# Patient Record
Sex: FEMALE | Race: WHITE | NOT HISPANIC OR LATINO | Employment: OTHER | URBAN - NONMETROPOLITAN AREA
[De-identification: names, ages, dates, MRNs, and addresses within clinical notes are randomized per-mention and may not be internally consistent; named-entity substitution may affect disease eponyms.]

---

## 2017-06-18 ENCOUNTER — HOSPITAL ENCOUNTER (EMERGENCY)
Facility: HOSPITAL | Age: 77
Discharge: HOME | End: 2017-06-18
Attending: EMERGENCY MEDICINE | Admitting: FAMILY MEDICINE

## 2017-06-18 ENCOUNTER — APPOINTMENT (OUTPATIENT)
Dept: GENERAL RADIOLOGY | Facility: HOSPITAL | Age: 77
End: 2017-06-18

## 2017-06-18 ENCOUNTER — APPOINTMENT (OUTPATIENT)
Dept: CT IMAGING | Facility: HOSPITAL | Age: 77
End: 2017-06-18

## 2017-06-18 VITALS
HEART RATE: 77 BPM | RESPIRATION RATE: 20 BRPM | TEMPERATURE: 98.7 F | BODY MASS INDEX: 28.17 KG/M2 | SYSTOLIC BLOOD PRESSURE: 134 MMHG | WEIGHT: 159 LBS | OXYGEN SATURATION: 98 % | HEIGHT: 63 IN | DIASTOLIC BLOOD PRESSURE: 77 MMHG

## 2017-06-18 DIAGNOSIS — M84.48XA SACRAL INSUFFICIENCY FRACTURE, INITIAL ENCOUNTER: Primary | ICD-10-CM

## 2017-06-18 LAB
ALBUMIN SERPL-MCNC: 4.4 G/DL (ref 3.4–4.8)
ALBUMIN/GLOB SERPL: 1.3 G/DL (ref 1.5–2.5)
ALP SERPL-CCNC: 171 U/L (ref 35–104)
ALT SERPL W P-5'-P-CCNC: 28 U/L (ref 10–36)
ANION GAP SERPL CALCULATED.3IONS-SCNC: 8.2 MMOL/L (ref 3.6–11.2)
AST SERPL-CCNC: 24 U/L (ref 10–30)
BASOPHILS # BLD AUTO: 0.02 10*3/MM3 (ref 0–0.3)
BASOPHILS NFR BLD AUTO: 0.4 % (ref 0–2)
BILIRUB SERPL-MCNC: 0.7 MG/DL (ref 0.2–1.8)
BUN BLD-MCNC: 16 MG/DL (ref 7–21)
BUN/CREAT SERPL: 26.7 (ref 7–25)
CALCIUM SPEC-SCNC: 9.4 MG/DL (ref 7.7–10)
CHLORIDE SERPL-SCNC: 109 MMOL/L (ref 99–112)
CO2 SERPL-SCNC: 29.8 MMOL/L (ref 24.3–31.9)
CREAT BLD-MCNC: 0.6 MG/DL (ref 0.43–1.29)
DEPRECATED RDW RBC AUTO: 43.4 FL (ref 37–54)
EOSINOPHIL # BLD AUTO: 0.15 10*3/MM3 (ref 0–0.7)
EOSINOPHIL NFR BLD AUTO: 2.7 % (ref 0–7)
ERYTHROCYTE [DISTWIDTH] IN BLOOD BY AUTOMATED COUNT: 12.6 % (ref 11.5–14.5)
GFR SERPL CREATININE-BSD FRML MDRD: 97 ML/MIN/1.73
GLOBULIN UR ELPH-MCNC: 3.5 GM/DL
GLUCOSE BLD-MCNC: 77 MG/DL (ref 70–110)
HCT VFR BLD AUTO: 39.9 % (ref 37–47)
HGB BLD-MCNC: 12.5 G/DL (ref 12–16)
IMM GRANULOCYTES # BLD: 0.01 10*3/MM3 (ref 0–0.03)
IMM GRANULOCYTES NFR BLD: 0.2 % (ref 0–0.5)
LYMPHOCYTES # BLD AUTO: 1.56 10*3/MM3 (ref 1–3)
LYMPHOCYTES NFR BLD AUTO: 27.6 % (ref 16–46)
MCH RBC QN AUTO: 30.6 PG (ref 27–33)
MCHC RBC AUTO-ENTMCNC: 31.3 G/DL (ref 33–37)
MCV RBC AUTO: 97.6 FL (ref 80–94)
MONOCYTES # BLD AUTO: 0.45 10*3/MM3 (ref 0.1–0.9)
MONOCYTES NFR BLD AUTO: 8 % (ref 0–12)
NEUTROPHILS # BLD AUTO: 3.47 10*3/MM3 (ref 1.4–6.5)
NEUTROPHILS NFR BLD AUTO: 61.1 % (ref 40–75)
OSMOLALITY SERPL CALC.SUM OF ELEC: 292.4 MOSM/KG (ref 273–305)
PLATELET # BLD AUTO: 237 10*3/MM3 (ref 130–400)
PMV BLD AUTO: 9.4 FL (ref 6–10)
POTASSIUM BLD-SCNC: 3.8 MMOL/L (ref 3.5–5.3)
PROT SERPL-MCNC: 7.9 G/DL (ref 6–8)
RBC # BLD AUTO: 4.09 10*6/MM3 (ref 4.2–5.4)
SODIUM BLD-SCNC: 147 MMOL/L (ref 135–153)
WBC NRBC COR # BLD: 5.66 10*3/MM3 (ref 4.5–12.5)

## 2017-06-18 PROCEDURE — 74177 CT ABD & PELVIS W/CONTRAST: CPT

## 2017-06-18 PROCEDURE — 85025 COMPLETE CBC W/AUTO DIFF WBC: CPT | Performed by: NURSE PRACTITIONER

## 2017-06-18 PROCEDURE — 99283 EMERGENCY DEPT VISIT LOW MDM: CPT

## 2017-06-18 PROCEDURE — 0 IOPAMIDOL 61 % SOLUTION: Performed by: EMERGENCY MEDICINE

## 2017-06-18 PROCEDURE — 73523 X-RAY EXAM HIPS BI 5/> VIEWS: CPT | Performed by: RADIOLOGY

## 2017-06-18 PROCEDURE — 36415 COLL VENOUS BLD VENIPUNCTURE: CPT

## 2017-06-18 PROCEDURE — 74177 CT ABD & PELVIS W/CONTRAST: CPT | Performed by: RADIOLOGY

## 2017-06-18 PROCEDURE — 73523 X-RAY EXAM HIPS BI 5/> VIEWS: CPT

## 2017-06-18 PROCEDURE — 80053 COMPREHEN METABOLIC PANEL: CPT | Performed by: NURSE PRACTITIONER

## 2017-06-18 RX ORDER — SODIUM CHLORIDE 0.9 % (FLUSH) 0.9 %
10 SYRINGE (ML) INJECTION AS NEEDED
Status: DISCONTINUED | OUTPATIENT
Start: 2017-06-18 | End: 2017-06-18 | Stop reason: HOSPADM

## 2017-06-18 RX ADMIN — IOPAMIDOL 88 ML: 612 INJECTION, SOLUTION INTRAVENOUS at 19:11

## 2017-06-19 NOTE — DISCHARGE INSTRUCTIONS

## 2017-06-19 NOTE — ED PROVIDER NOTES
Subjective   Patient is a 77 y.o. female presenting with lower extremity pain.   History provided by:  Patient  Lower Extremity Issue   Location:  Hip  Hip location:  L hip  Pain details:     Quality:  Aching, shooting and sharp    Radiates to:  Does not radiate    Severity:  Moderate    Onset quality:  Sudden    Duration:  7 days    Timing:  Constant    Progression:  Worsening  Chronicity:  New  Dislocation: no    Foreign body present:  No foreign bodies  Prior injury to area:  No  Relieved by:  Nothing  Ineffective treatments:  None tried  Associated symptoms: no fever        Review of Systems   Constitutional: Negative.  Negative for fever.   HENT: Negative.    Respiratory: Negative.    Cardiovascular: Negative.  Negative for chest pain.   Gastrointestinal: Negative.  Negative for abdominal pain.   Endocrine: Negative.    Genitourinary: Negative.  Negative for dysuria.   Skin: Negative.    Neurological: Negative.    Psychiatric/Behavioral: Negative.    All other systems reviewed and are negative.      Past Medical History:   Diagnosis Date   • MRSA (methicillin resistant Staphylococcus aureus) 2007    History of MRSA with I&D of lower extremity       No Known Allergies    Past Surgical History:   Procedure Laterality Date   • AORTIC VALVE REPAIR/REPLACEMENT  10/2013    Dr. Sebastian   • CARDIAC SURGERY         History reviewed. No pertinent family history.    Social History     Social History   • Marital status:      Spouse name: N/A   • Number of children: N/A   • Years of education: N/A     Social History Main Topics   • Smoking status: Former Smoker     Quit date: 1987   • Smokeless tobacco: None   • Alcohol use No   • Drug use: No   • Sexual activity: Defer     Other Topics Concern   • None     Social History Narrative   • None           Objective   Physical Exam   Constitutional: She is oriented to person, place, and time. She appears well-developed and well-nourished. No distress.   HENT:   Head:  Normocephalic and atraumatic.   Right Ear: External ear normal.   Left Ear: External ear normal.   Nose: Nose normal.   Eyes: Conjunctivae and EOM are normal. Pupils are equal, round, and reactive to light.   Neck: Normal range of motion. Neck supple. No JVD present. No tracheal deviation present.   Cardiovascular: Normal rate, regular rhythm and normal heart sounds.    No murmur heard.  Pulmonary/Chest: Effort normal and breath sounds normal. No respiratory distress. She has no wheezes.   Abdominal: Soft. Bowel sounds are normal. There is no tenderness.   Musculoskeletal: Normal range of motion. She exhibits no edema or deformity.   Neurological: She is alert and oriented to person, place, and time. No cranial nerve deficit.   Skin: Skin is warm and dry. No rash noted. She is not diaphoretic. No erythema. No pallor.   Psychiatric: She has a normal mood and affect. Her behavior is normal. Thought content normal.   Nursing note and vitals reviewed.      Procedures         ED Course  ED Course   Value Comment By Time   CT Abdomen Pelvis With Contrast Left sacral insufficiency fracture.  Possible right sacral insufficiency fracture.  Mild L4 compression fracture of uncertain age. France Chaparro, APRCATHY 06/18 2039    Patient as great kailey support and would like to go home with them to follow up with PCP.  Dr. Miller said as long as she could ambulate she could go home to follow up outpatient.  Daughters agree to stay with patient at her house. France Chaparro, VALDEZ 06/18 2040                  MDM  Number of Diagnoses or Management Options  Sacral insufficiency fracture, initial encounter: new and requires workup     Amount and/or Complexity of Data Reviewed  Tests in the radiology section of CPT®: ordered and reviewed  Independent visualization of images, tracings, or specimens: yes    Risk of Complications, Morbidity, and/or Mortality  Presenting problems: moderate  Diagnostic procedures: low  Management options:  low    Patient Progress  Patient progress: improved      Final diagnoses:   Sacral insufficiency fracture, initial encounter            France Chaparro, APRN  06/19/17 0236

## 2017-07-17 ENCOUNTER — TELEPHONE (OUTPATIENT)
Dept: CARDIOLOGY | Facility: CLINIC | Age: 77
End: 2017-07-17

## 2017-07-17 NOTE — TELEPHONE ENCOUNTER
Family called in stating that patient will have hip surgery today, and they were admitted prior to procedure to get cardiac clearance and possibly an echo.  The cardiologist at the hospital signed off without doing the echo, and they are worried the patient needs one.    Informed patient that sometimes due to necessity of surgery the patient will have to be cleared by another cardiologist.  I confirmed patient's follow up appt.    Family verbalized understanding.

## 2017-08-14 RX ORDER — ATORVASTATIN CALCIUM 20 MG/1
TABLET, FILM COATED ORAL
Qty: 90 TABLET | Refills: 2 | Status: SHIPPED | OUTPATIENT
Start: 2017-08-14 | End: 2018-07-12 | Stop reason: SDUPTHER

## 2017-09-18 ENCOUNTER — TELEPHONE (OUTPATIENT)
Dept: CARDIOLOGY | Facility: CLINIC | Age: 77
End: 2017-09-18

## 2017-09-18 NOTE — TELEPHONE ENCOUNTER
Family wanted to know if patient needs same day echo for upcoming appt.    Informed patient that per last note she will be evaluated for echo during this appt.    Family verbalized understanding.

## 2017-09-19 ENCOUNTER — OFFICE VISIT (OUTPATIENT)
Dept: CARDIOLOGY | Facility: CLINIC | Age: 77
End: 2017-09-19

## 2017-09-19 VITALS
HEART RATE: 68 BPM | DIASTOLIC BLOOD PRESSURE: 68 MMHG | WEIGHT: 159 LBS | HEIGHT: 63 IN | SYSTOLIC BLOOD PRESSURE: 115 MMHG | BODY MASS INDEX: 28.17 KG/M2

## 2017-09-19 DIAGNOSIS — E78.5 DYSLIPIDEMIA: ICD-10-CM

## 2017-09-19 DIAGNOSIS — I10 ESSENTIAL HYPERTENSION: ICD-10-CM

## 2017-09-19 DIAGNOSIS — I38 VALVULAR HEART DISEASE: Primary | ICD-10-CM

## 2017-09-19 PROCEDURE — 99213 OFFICE O/P EST LOW 20 MIN: CPT | Performed by: PHYSICIAN ASSISTANT

## 2017-09-19 NOTE — PROGRESS NOTES
Encounter Date:09/19/2017      Patient ID: Elizabeth Caceres is a 77 y.o. female.    Chief Complaint: valvular heart disease    PROBLEM LIST:  1. Valvular heart disease:  a. Critical aortic stenosis by echocardiogram, October 2013, showing aortic valve area of 0.7 and mean gradient  of 45 mmHg.   b. Abnormal Cardiolite stress test, October 2013.    c. Cardiac catheterization study by Dr. Osman, 10/23/2013, showed critical aortic stenosis with gradient of 101 mm across the aortic valve.  EF 70%.  No significant coronary disease.    d.  Aortic valve replacement with bioprosthetic valve by Dr. Sebastian in October 2013.   e. Normal myocardial perfusion stress with no evidence of ischemia. EF 80  2. Hypertension.   3. Dyslipidemia.    4. History of presyncope.   5. History of MRSA with I&D of lower extremity, 2007.   6. Vitamin D and Vitamin B12 deficiency.     History of Present Illness  Patient presents today for follow-up with a history of VHD and cardiac risk factors. She reports no current exertional chest pain or dyspnea and orthopnea no PND no lower extremity edema no tachycardia palpitations dizziness or syncope.  She is currently recovering from a hip fracture.  She does physical therapy currently 3 times per week which includes bicycle riding without exertional chest pain or dyspnea.  Her blood pressure generally runs in the 130s systolic.  She has not had a lipid panel in the past year.    No Known Allergies      Current Outpatient Prescriptions:   •  aspirin 81 MG EC tablet, Take 81 mg by mouth daily., Disp: , Rfl:   •  atorvastatin (LIPITOR) 20 MG tablet, TAKE 1 TABLET EVERY DAY, Disp: 90 tablet, Rfl: 2  •  metoprolol tartrate (LOPRESSOR) 25 MG tablet, TAKE 1 TABLET TWICE DAILY, Disp: 180 tablet, Rfl: 2  •  Multiple Vitamins-Minerals (MULTIVITAMIN ADULT PO), Take  by mouth daily., Disp: , Rfl:   •  vitamin B-12 (CYANOCOBALAMIN) 1000 MCG tablet, Take 1,000 mcg by mouth daily., Disp: , Rfl:   •  vitamin C  "(ASCORBIC ACID) 250 MG tablet, Take 250 mg by mouth Daily., Disp: , Rfl:   •  Vitamin D, Cholecalciferol, (CHOLECALCIFEROL) 400 UNITS tablet, Take 400 Units by mouth Daily. Patient unsure of units prescribed, Disp: , Rfl:     The following portions of the patient's history were reviewed and updated as appropriate: allergies, current medications, past family history, past medical history, past social history, past surgical history and problem list.    Review of Systems   Constitution: Negative for diaphoresis, weakness, malaise/fatigue and weight gain.   Cardiovascular: Negative for chest pain, claudication, dyspnea on exertion, irregular heartbeat, leg swelling, orthopnea, palpitations, paroxysmal nocturnal dyspnea and syncope.   Respiratory: Negative for cough, shortness of breath, sleep disturbances due to breathing and wheezing.    Hematologic/Lymphatic: Negative for bleeding problem.   Musculoskeletal: Negative for muscle cramps, muscle weakness and myalgias.   Gastrointestinal: Negative for heartburn.       Objective:     Blood pressure 115/68, pulse 68, height 63\" (160 cm), weight 159 lb (72.1 kg).      Physical Exam   Constitutional: She appears well-developed.   Cardiovascular: Normal rate, regular rhythm and intact distal pulses.  Exam reveals no gallop and no friction rub.    Murmur heard.  2/6 systolic murmur right upper border   Pulmonary/Chest: Effort normal and breath sounds normal. No respiratory distress. She has no wheezes. She has no rales. She exhibits no tenderness.   Bases clear   Musculoskeletal: Normal range of motion. She exhibits no edema.       Lab Review:   Procedures       Assessment:      Diagnosis Plan   1.  valvular heart disease stable     2. Essential hypertension  Well controlled current medical regimen FLP and a CMP    3. Dyslipidemia       Plan:     Stable cardiac status.  Continue current medications.   FU in 12MO, sooner as needed.  Thank you for allowing us to participate in " the care of your patient.       NAYE Kowalski  09/19/17  1:42 PM      Please note that portions of this note may have been completed with a voice recognition program. Efforts were made to edit the dictations, but occasionally words are mistranscribed.

## 2017-10-03 ENCOUNTER — HOSPITAL ENCOUNTER (OUTPATIENT)
Dept: CARDIOLOGY | Facility: HOSPITAL | Age: 77
Discharge: HOME | End: 2017-10-03
Admitting: PHYSICIAN ASSISTANT

## 2017-10-03 DIAGNOSIS — I38 VALVULAR HEART DISEASE: ICD-10-CM

## 2017-10-03 PROCEDURE — 93306 TTE W/DOPPLER COMPLETE: CPT

## 2017-10-03 PROCEDURE — 93306 TTE W/DOPPLER COMPLETE: CPT | Performed by: INTERNAL MEDICINE

## 2017-10-05 LAB
BH CV ECHO MEAS - % IVS THICK: 28.6 %
BH CV ECHO MEAS - % LVPW THICK: 140 %
BH CV ECHO MEAS - ACS: 1.3 CM
BH CV ECHO MEAS - AO MAX PG (FULL): 25.7 MMHG
BH CV ECHO MEAS - AO MAX PG: 32.8 MMHG
BH CV ECHO MEAS - AO MEAN PG (FULL): 14.4 MMHG
BH CV ECHO MEAS - AO MEAN PG: 18.9 MMHG
BH CV ECHO MEAS - AO ROOT AREA (BSA CORRECTED): 1.4
BH CV ECHO MEAS - AO ROOT AREA: 4.9 CM^2
BH CV ECHO MEAS - AO ROOT DIAM: 2.5 CM
BH CV ECHO MEAS - AO V2 MAX: 286.2 CM/SEC
BH CV ECHO MEAS - AO V2 MEAN: 203.3 CM/SEC
BH CV ECHO MEAS - AO V2 VTI: 67.4 CM
BH CV ECHO MEAS - AVA(I,A): 1 CM^2
BH CV ECHO MEAS - AVA(I,D): 1 CM^2
BH CV ECHO MEAS - AVA(V,A): 0.94 CM^2
BH CV ECHO MEAS - AVA(V,D): 0.94 CM^2
BH CV ECHO MEAS - BSA(HAYCOCK): 1.8 M^2
BH CV ECHO MEAS - BSA: 1.8 M^2
BH CV ECHO MEAS - BZI_BMI: 28.2 KILOGRAMS/M^2
BH CV ECHO MEAS - BZI_METRIC_HEIGHT: 160 CM
BH CV ECHO MEAS - BZI_METRIC_WEIGHT: 72.1 KG
BH CV ECHO MEAS - CONTRAST EF 4CH: 69 ML/M^2
BH CV ECHO MEAS - EDV(CUBED): 201.9 ML
BH CV ECHO MEAS - EDV(MOD-SP4): 42 ML
BH CV ECHO MEAS - EDV(TEICH): 171 ML
BH CV ECHO MEAS - EF(CUBED): 77 %
BH CV ECHO MEAS - EF(MOD-SP4): 69 %
BH CV ECHO MEAS - EF(TEICH): 68.3 %
BH CV ECHO MEAS - ESV(CUBED): 46.4 ML
BH CV ECHO MEAS - ESV(MOD-SP4): 13 ML
BH CV ECHO MEAS - ESV(TEICH): 54.2 ML
BH CV ECHO MEAS - FS: 38.8 %
BH CV ECHO MEAS - IVS/LVPW: 1.1
BH CV ECHO MEAS - IVSD: 0.77 CM
BH CV ECHO MEAS - IVSS: 0.99 CM
BH CV ECHO MEAS - LA DIMENSION: 3.8 CM
BH CV ECHO MEAS - LA/AO: 1.5
BH CV ECHO MEAS - LV DIASTOLIC VOL/BSA (35-75): 23.9 ML/M^2
BH CV ECHO MEAS - LV MASS(C)D: 165.6 GRAMS
BH CV ECHO MEAS - LV MASS(C)DI: 94.4 GRAMS/M^2
BH CV ECHO MEAS - LV MASS(C)S: 174.3 GRAMS
BH CV ECHO MEAS - LV MASS(C)SI: 99.4 GRAMS/M^2
BH CV ECHO MEAS - LV MAX PG: 7.1 MMHG
BH CV ECHO MEAS - LV MEAN PG: 4.5 MMHG
BH CV ECHO MEAS - LV SYSTOLIC VOL/BSA (12-30): 7.4 ML/M^2
BH CV ECHO MEAS - LV V1 MAX: 133.5 CM/SEC
BH CV ECHO MEAS - LV V1 MEAN: 101.5 CM/SEC
BH CV ECHO MEAS - LV V1 VTI: 34 CM
BH CV ECHO MEAS - LVIDD: 5.9 CM
BH CV ECHO MEAS - LVIDS: 3.6 CM
BH CV ECHO MEAS - LVLD AP4: 7.1 CM
BH CV ECHO MEAS - LVLS AP4: 5.4 CM
BH CV ECHO MEAS - LVOT AREA (M): 2 CM^2
BH CV ECHO MEAS - LVOT AREA: 2 CM^2
BH CV ECHO MEAS - LVOT DIAM: 1.6 CM
BH CV ECHO MEAS - LVPWD: 0.73 CM
BH CV ECHO MEAS - LVPWS: 1.8 CM
BH CV ECHO MEAS - MV A MAX VEL: 126.4 CM/SEC
BH CV ECHO MEAS - MV E MAX VEL: 87.9 CM/SEC
BH CV ECHO MEAS - MV E/A: 0.7
BH CV ECHO MEAS - PA ACC SLOPE: 1020 CM/SEC^2
BH CV ECHO MEAS - PA ACC TIME: 0.12 SEC
BH CV ECHO MEAS - PA PR(ACCEL): 25.1 MMHG
BH CV ECHO MEAS - RAP SYSTOLE: 10 MMHG
BH CV ECHO MEAS - RVDD: 1.4 CM
BH CV ECHO MEAS - RVSP: 34.1 MMHG
BH CV ECHO MEAS - SI(AO): 188.5 ML/M^2
BH CV ECHO MEAS - SI(CUBED): 88.6 ML/M^2
BH CV ECHO MEAS - SI(LVOT): 38.8 ML/M^2
BH CV ECHO MEAS - SI(MOD-SP4): 16.5 ML/M^2
BH CV ECHO MEAS - SI(TEICH): 66.6 ML/M^2
BH CV ECHO MEAS - SV(AO): 330.6 ML
BH CV ECHO MEAS - SV(CUBED): 155.5 ML
BH CV ECHO MEAS - SV(LVOT): 68.1 ML
BH CV ECHO MEAS - SV(MOD-SP4): 29 ML
BH CV ECHO MEAS - SV(TEICH): 116.8 ML
BH CV ECHO MEAS - TR MAX VEL: 245.7 CM/SEC

## 2017-10-19 ENCOUNTER — TRANSCRIBE ORDERS (OUTPATIENT)
Dept: ADMINISTRATIVE | Facility: HOSPITAL | Age: 77
End: 2017-10-19

## 2017-10-19 DIAGNOSIS — Z78.0 POSTMENOPAUSAL: ICD-10-CM

## 2017-10-19 DIAGNOSIS — M80.00XA AGE-RELATED OSTEOPOROSIS WITH CURRENT PATHOLOGICAL FRACTURE, INITIAL ENCOUNTER: Primary | ICD-10-CM

## 2017-10-25 ENCOUNTER — TRANSCRIBE ORDERS (OUTPATIENT)
Dept: ADMINISTRATIVE | Facility: HOSPITAL | Age: 77
End: 2017-10-25

## 2017-10-25 DIAGNOSIS — Z78.0 POST-MENOPAUSAL: Primary | ICD-10-CM

## 2017-11-07 ENCOUNTER — HOSPITAL ENCOUNTER (OUTPATIENT)
Dept: BONE DENSITY | Facility: HOSPITAL | Age: 77
Discharge: HOME | End: 2017-11-07
Attending: INTERNAL MEDICINE | Admitting: INTERNAL MEDICINE

## 2017-11-07 DIAGNOSIS — Z78.0 POST-MENOPAUSAL: ICD-10-CM

## 2017-11-07 PROCEDURE — 77080 DXA BONE DENSITY AXIAL: CPT | Performed by: RADIOLOGY

## 2017-11-07 PROCEDURE — 77080 DXA BONE DENSITY AXIAL: CPT

## 2018-07-13 RX ORDER — ATORVASTATIN CALCIUM 20 MG/1
TABLET, FILM COATED ORAL
Qty: 90 TABLET | Refills: 1 | Status: SHIPPED | OUTPATIENT
Start: 2018-07-13 | End: 2018-07-19 | Stop reason: SDUPTHER

## 2018-07-19 RX ORDER — ATORVASTATIN CALCIUM 20 MG/1
20 TABLET, FILM COATED ORAL DAILY
Qty: 90 TABLET | Refills: 1 | Status: SHIPPED | OUTPATIENT
Start: 2018-07-19 | End: 2019-04-23

## 2018-09-25 ENCOUNTER — OFFICE VISIT (OUTPATIENT)
Dept: CARDIOLOGY | Facility: CLINIC | Age: 78
End: 2018-09-25

## 2018-09-25 VITALS
DIASTOLIC BLOOD PRESSURE: 60 MMHG | HEART RATE: 72 BPM | BODY MASS INDEX: 28.51 KG/M2 | WEIGHT: 151 LBS | HEIGHT: 61 IN | OXYGEN SATURATION: 96 % | SYSTOLIC BLOOD PRESSURE: 140 MMHG

## 2018-09-25 DIAGNOSIS — I10 ESSENTIAL HYPERTENSION: ICD-10-CM

## 2018-09-25 DIAGNOSIS — E78.5 DYSLIPIDEMIA: ICD-10-CM

## 2018-09-25 DIAGNOSIS — I38 VALVULAR HEART DISEASE: Primary | ICD-10-CM

## 2018-09-25 PROCEDURE — 99213 OFFICE O/P EST LOW 20 MIN: CPT | Performed by: INTERNAL MEDICINE

## 2018-09-25 NOTE — PROGRESS NOTES
Encounter Date:09/25/2018      Patient ID: Elizabeth Caceres is a 78 y.o. female.    Chief Complaint: vhd      PROBLEM LIST:  1. Valvular heart disease:  a. Critical aortic stenosis by echocardiogram, October 2013, showing aortic valve area of 0.7 and mean gradient  of 45 mmHg.   b. Abnormal Cardiolite stress test, October 2013.    c. Cardiac catheterization study by Dr. Osman, 10/23/2013, showed critical aortic stenosis with gradient of 101 mm across the aortic valve.  EF 70%.  No significant coronary disease.    d.  Aortic valve replacement with bioprosthetic valve by Dr. Sebastian in October 2013.   e. Normal myocardial perfusion stress with no evidence of ischemia. EF 80  f. Echo 10/3/2017: Normal left ventricular cavity size and wall thickness noted. All left ventricular wall segments contract normally. Estimated EF appears to be in the range of 66 - 70%. Left ventricular diastolic dysfunction is noted (grade I) consistent with impaired relaxation. There is a bioprosthetic aortic valve present There is calcification of the aortic valve mainly affecting the non and right coronary cusp(s). Mild to moderate aortic valve stenosis is present.Peak gradient of 35 and a mean gradient of 20 mm Hg. with AV area calculated at about 1.0cm2 which is probably an underestimation. The mitral valve is abnormal in structure. There are myxomatous changes of the mitral valve apparatus present. Mild mitral valve regurgitation is present. No significant mitral valve stenosis is present. The tricuspid valve is grossly normal. Mild tricuspid valve regurgitation is present. Estimated right ventricular systolic pressure from tricuspid regurgitation is normal (<35 mmHg). There is no evidence of pericardial effusion.  2. Hypertension.   3. Dyslipidemia.    4. History of presyncope.   5. History of MRSA with I&D of lower extremity, 2007.   6. Vitamin D and Vitamin B12 deficiency.     History of Present Illness  Patient presents today  "for follow-up with a history of valvular heart disease and cardiac risk factors. Since last visit has been doing well from a cardiac standpoint. She regularly monitors her blood pressure at home, with readings usually low. Denies chest pain, shortness of breath, leg swelling, palpitations, and syncope. Remains busy and active with walking and household chores with no limitations.    No Known Allergies      Current Outpatient Prescriptions:   •  aspirin 81 MG EC tablet, Take 81 mg by mouth daily., Disp: , Rfl:   •  atorvastatin (LIPITOR) 20 MG tablet, Take 1 tablet by mouth Daily., Disp: 90 tablet, Rfl: 1  •  Cyanocobalamin (B-12 COMPLIANCE INJECTION) 1000 MCG/ML kit, Inject  as directed Every 30 (Thirty) Days., Disp: , Rfl:   •  metoprolol tartrate (LOPRESSOR) 25 MG tablet, Take 1 tablet by mouth 2 (Two) Times a Day., Disp: 180 tablet, Rfl: 1  •  vitamin C (ASCORBIC ACID) 250 MG tablet, Take 500 mg by mouth Daily., Disp: , Rfl:   •  Vitamin D, Cholecalciferol, (CHOLECALCIFEROL) 400 UNITS tablet, Take 1,000 Units by mouth Daily. Patient unsure of units prescribed , Disp: , Rfl:     The following portions of the patient's history were reviewed and updated as appropriate: allergies, current medications, past family history, past medical history, past social history, past surgical history and problem list.    ROS  Review of Systems   Constitution: Negative for chills, fever, weight gain and weight loss.   Cardiovascular: Negative for chest pain, claudication, dyspnea on exertion, leg swelling, orthopnea, palpitations, paroxysmal nocturnal dyspnea and syncope.        No dizziness   Gastrointestinal: Negative for abdominal pain, constipation, diarrhea, nausea and vomiting.   Genitourinary:        No urinary symptoms   Neurological:        No symptoms of stroke.   All other systems reviewed and are negative.    Objective:     Blood pressure 140/60, pulse 72, height 154.9 cm (61\"), weight 68.5 kg (151 lb), SpO2 96 %.      " Physical Exam  Constitutional: She appears well-developed and well-nourished.   HENT:   HEENT exam unremarkable.   Neck: Neck supple. No JVD present.   No carotid bruits.   Cardiovascular: Normal rate, regular rhythm and normal heart sounds.    2/6 systolic murmur heard.  2 plus symmetric pulses.   Pulmonary/Chest: Breath sounds normal. Does not exhibit tenderness.   Abdominal:   Abdomen benign.   Musculoskeletal: Does not exhibit edema.   Neurological:   Neurological exam unremarkable.   Vitals reviewed.    Lab Review:   Procedures       Assessment:      Diagnosis Plan   1. Valvular heart disease  Schedule echo to assess valve function due to prosthetic valve.   2. Essential hypertension  Well controlled with current medication regimen.    3. Dyslipidemia. LDL goal <70. Continue Lipitor 20 mg daily.     Plan:   If start to notice new symptoms of chest pain, dyspnea/edema, dizziness or syncope, she should pursue further heart and evaluation for checkup due to progressive prosthetic valve aortic stenosis.  Schedule echo to assess valve fucntion for close monitoring  Continue current medications.   FU in 12 MO, sooner as needed.  Thank you for allowing us to participate in the care of your patient.     Scribed for Carlton Osman MD by Berry Brewer. 9/25/2018  2:25 PM    ICarlton MD, personally performed the services described in this documentation as scribed by the above named individual in my presence, and it is both accurate and complete.  9/25/2018  2:29 PM        Please note that portions of this note may have been completed with a voice recognition program. Efforts were made to edit the dictations, but occasionally words are mistranscribed.

## 2018-10-11 ENCOUNTER — HOSPITAL ENCOUNTER (OUTPATIENT)
Dept: CARDIOLOGY | Facility: HOSPITAL | Age: 78
Discharge: HOME | End: 2018-10-11
Admitting: INTERNAL MEDICINE

## 2018-10-11 DIAGNOSIS — I38 VALVULAR HEART DISEASE: ICD-10-CM

## 2018-10-11 PROCEDURE — 93306 TTE W/DOPPLER COMPLETE: CPT

## 2018-10-11 PROCEDURE — 93306 TTE W/DOPPLER COMPLETE: CPT | Performed by: INTERNAL MEDICINE

## 2018-10-12 LAB
BH CV ECHO MEAS - ACS: 1.9 CM
BH CV ECHO MEAS - AO MAX PG: 30.4 MMHG
BH CV ECHO MEAS - AO MEAN PG: 18.2 MMHG
BH CV ECHO MEAS - AO ROOT AREA (BSA CORRECTED): 1.7
BH CV ECHO MEAS - AO ROOT AREA: 6 CM^2
BH CV ECHO MEAS - AO ROOT DIAM: 2.8 CM
BH CV ECHO MEAS - AO V2 MAX: 275.5 CM/SEC
BH CV ECHO MEAS - AO V2 MEAN: 201.2 CM/SEC
BH CV ECHO MEAS - AO V2 VTI: 62.6 CM
BH CV ECHO MEAS - BSA(HAYCOCK): 1.7 M^2
BH CV ECHO MEAS - BSA: 1.7 M^2
BH CV ECHO MEAS - BZI_BMI: 28.5 KILOGRAMS/M^2
BH CV ECHO MEAS - BZI_METRIC_HEIGHT: 154.9 CM
BH CV ECHO MEAS - BZI_METRIC_WEIGHT: 68.5 KG
BH CV ECHO MEAS - EDV(CUBED): 29 ML
BH CV ECHO MEAS - EDV(MOD-SP4): 58 ML
BH CV ECHO MEAS - EDV(TEICH): 37.1 ML
BH CV ECHO MEAS - EF(CUBED): 49.3 %
BH CV ECHO MEAS - EF(MOD-SP4): 74.1 %
BH CV ECHO MEAS - EF(TEICH): 42.8 %
BH CV ECHO MEAS - ESV(CUBED): 14.7 ML
BH CV ECHO MEAS - ESV(MOD-SP4): 15 ML
BH CV ECHO MEAS - ESV(TEICH): 21.2 ML
BH CV ECHO MEAS - FS: 20.3 %
BH CV ECHO MEAS - IVS/LVPW: 1.3
BH CV ECHO MEAS - IVSD: 1.5 CM
BH CV ECHO MEAS - LA DIMENSION: 3.6 CM
BH CV ECHO MEAS - LA/AO: 1.3
BH CV ECHO MEAS - LV DIASTOLIC VOL/BSA (35-75): 34.6 ML/M^2
BH CV ECHO MEAS - LV MASS(C)D: 124.5 GRAMS
BH CV ECHO MEAS - LV MASS(C)DI: 74.3 GRAMS/M^2
BH CV ECHO MEAS - LV SYSTOLIC VOL/BSA (12-30): 8.9 ML/M^2
BH CV ECHO MEAS - LVIDD: 3.1 CM
BH CV ECHO MEAS - LVIDS: 2.5 CM
BH CV ECHO MEAS - LVLD AP4: 6.9 CM
BH CV ECHO MEAS - LVLS AP4: 5.8 CM
BH CV ECHO MEAS - LVOT AREA (M): 1.8 CM^2
BH CV ECHO MEAS - LVOT AREA: 1.8 CM^2
BH CV ECHO MEAS - LVOT DIAM: 1.5 CM
BH CV ECHO MEAS - LVPWD: 1.1 CM
BH CV ECHO MEAS - MV A MAX VEL: 107.1 CM/SEC
BH CV ECHO MEAS - MV E MAX VEL: 86.9 CM/SEC
BH CV ECHO MEAS - MV E/A: 0.81
BH CV ECHO MEAS - RAP SYSTOLE: 10 MMHG
BH CV ECHO MEAS - RVDD: 3.5 CM
BH CV ECHO MEAS - RVSP: 36.9 MMHG
BH CV ECHO MEAS - SI(AO): 225.5 ML/M^2
BH CV ECHO MEAS - SI(CUBED): 8.5 ML/M^2
BH CV ECHO MEAS - SI(MOD-SP4): 25.6 ML/M^2
BH CV ECHO MEAS - SI(TEICH): 9.5 ML/M^2
BH CV ECHO MEAS - SV(AO): 378 ML
BH CV ECHO MEAS - SV(CUBED): 14.3 ML
BH CV ECHO MEAS - SV(MOD-SP4): 43 ML
BH CV ECHO MEAS - SV(TEICH): 15.9 ML
BH CV ECHO MEAS - TR MAX VEL: 259.3 CM/SEC
MAXIMAL PREDICTED HEART RATE: 142 BPM
STRESS TARGET HR: 121 BPM

## 2019-04-22 ENCOUNTER — HOSPITAL ENCOUNTER (OUTPATIENT)
Facility: HOSPITAL | Age: 79
Discharge: HOME | End: 2019-04-24
Attending: EMERGENCY MEDICINE | Admitting: SURGERY

## 2019-04-22 ENCOUNTER — APPOINTMENT (OUTPATIENT)
Dept: CT IMAGING | Facility: HOSPITAL | Age: 79
End: 2019-04-22

## 2019-04-22 DIAGNOSIS — N30.00 ACUTE CYSTITIS WITHOUT HEMATURIA: ICD-10-CM

## 2019-04-22 DIAGNOSIS — K35.30 ACUTE APPENDICITIS WITH LOCALIZED PERITONITIS, WITHOUT PERFORATION, ABSCESS, OR GANGRENE: Primary | ICD-10-CM

## 2019-04-22 DIAGNOSIS — N28.1 BILATERAL RENAL CYSTS: ICD-10-CM

## 2019-04-22 LAB
ALBUMIN SERPL-MCNC: 4.07 G/DL (ref 3.5–5.2)
ALBUMIN/GLOB SERPL: 1.2 G/DL
ALP SERPL-CCNC: 87 U/L (ref 39–117)
ALT SERPL W P-5'-P-CCNC: 12 U/L (ref 1–33)
AMYLASE SERPL-CCNC: 67 U/L (ref 28–100)
ANION GAP SERPL CALCULATED.3IONS-SCNC: 13.8 MMOL/L
AST SERPL-CCNC: 16 U/L (ref 1–32)
BACTERIA UR QL AUTO: ABNORMAL /HPF
BASOPHILS # BLD AUTO: 0.02 10*3/MM3 (ref 0–0.2)
BASOPHILS NFR BLD AUTO: 0.2 % (ref 0–1.5)
BILIRUB SERPL-MCNC: 1.4 MG/DL (ref 0.2–1.2)
BILIRUB UR QL STRIP: NEGATIVE
BUN BLD-MCNC: 20 MG/DL (ref 8–23)
BUN/CREAT SERPL: 27 (ref 7–25)
CALCIUM SPEC-SCNC: 8.7 MG/DL (ref 8.6–10.5)
CHLORIDE SERPL-SCNC: 104 MMOL/L (ref 98–107)
CLARITY UR: ABNORMAL
CO2 SERPL-SCNC: 24.2 MMOL/L (ref 22–29)
COLOR UR: ABNORMAL
CREAT BLD-MCNC: 0.74 MG/DL (ref 0.57–1)
D-LACTATE SERPL-SCNC: 0.8 MMOL/L (ref 0.5–2)
DEPRECATED RDW RBC AUTO: 43.5 FL (ref 37–54)
EOSINOPHIL # BLD AUTO: 0.09 10*3/MM3 (ref 0–0.4)
EOSINOPHIL NFR BLD AUTO: 1 % (ref 0.3–6.2)
ERYTHROCYTE [DISTWIDTH] IN BLOOD BY AUTOMATED COUNT: 12.4 % (ref 12.3–15.4)
GFR SERPL CREATININE-BSD FRML MDRD: 76 ML/MIN/1.73
GLOBULIN UR ELPH-MCNC: 3.3 GM/DL
GLUCOSE BLD-MCNC: 125 MG/DL (ref 65–99)
GLUCOSE UR STRIP-MCNC: NEGATIVE MG/DL
HCT VFR BLD AUTO: 38 % (ref 34–46.6)
HGB BLD-MCNC: 12.5 G/DL (ref 12–15.9)
HGB UR QL STRIP.AUTO: NEGATIVE
HYALINE CASTS UR QL AUTO: ABNORMAL /LPF
IMM GRANULOCYTES # BLD AUTO: 0.01 10*3/MM3 (ref 0–0.05)
IMM GRANULOCYTES NFR BLD AUTO: 0.1 % (ref 0–0.5)
KETONES UR QL STRIP: NEGATIVE
LEUKOCYTE ESTERASE UR QL STRIP.AUTO: ABNORMAL
LIPASE SERPL-CCNC: 26 U/L (ref 13–60)
LYMPHOCYTES # BLD AUTO: 1.63 10*3/MM3 (ref 0.7–3.1)
LYMPHOCYTES NFR BLD AUTO: 17.4 % (ref 19.6–45.3)
MCH RBC QN AUTO: 32.6 PG (ref 26.6–33)
MCHC RBC AUTO-ENTMCNC: 32.9 G/DL (ref 31.5–35.7)
MCV RBC AUTO: 99 FL (ref 79–97)
MONOCYTES # BLD AUTO: 0.74 10*3/MM3 (ref 0.1–0.9)
MONOCYTES NFR BLD AUTO: 7.9 % (ref 5–12)
NEUTROPHILS # BLD AUTO: 6.87 10*3/MM3 (ref 1.7–7)
NEUTROPHILS NFR BLD AUTO: 73.4 % (ref 42.7–76)
NITRITE UR QL STRIP: POSITIVE
PH UR STRIP.AUTO: 6.5 [PH] (ref 5–8)
PLATELET # BLD AUTO: 172 10*3/MM3 (ref 140–450)
PMV BLD AUTO: 9.7 FL (ref 6–12)
POTASSIUM BLD-SCNC: 3.9 MMOL/L (ref 3.5–5.2)
PROT SERPL-MCNC: 7.4 G/DL (ref 6–8.5)
PROT UR QL STRIP: NEGATIVE
RBC # BLD AUTO: 3.84 10*6/MM3 (ref 3.77–5.28)
RBC # UR: ABNORMAL /HPF
REF LAB TEST METHOD: ABNORMAL
SODIUM BLD-SCNC: 142 MMOL/L (ref 136–145)
SP GR UR STRIP: 1.01 (ref 1–1.03)
SQUAMOUS #/AREA URNS HPF: ABNORMAL /HPF
TROPONIN T SERPL-MCNC: <0.01 NG/ML (ref 0–0.03)
UROBILINOGEN UR QL STRIP: ABNORMAL
WBC NRBC COR # BLD: 9.36 10*3/MM3 (ref 3.4–10.8)
WBC UR QL AUTO: ABNORMAL /HPF

## 2019-04-22 PROCEDURE — 25010000002 ONDANSETRON PER 1 MG: Performed by: PHYSICIAN ASSISTANT

## 2019-04-22 PROCEDURE — 96375 TX/PRO/DX INJ NEW DRUG ADDON: CPT

## 2019-04-22 PROCEDURE — 99284 EMERGENCY DEPT VISIT MOD MDM: CPT

## 2019-04-22 PROCEDURE — 87040 BLOOD CULTURE FOR BACTERIA: CPT | Performed by: PHYSICIAN ASSISTANT

## 2019-04-22 PROCEDURE — 25010000002 CEFTRIAXONE: Performed by: PHYSICIAN ASSISTANT

## 2019-04-22 PROCEDURE — 83690 ASSAY OF LIPASE: CPT | Performed by: PHYSICIAN ASSISTANT

## 2019-04-22 PROCEDURE — 87077 CULTURE AEROBIC IDENTIFY: CPT | Performed by: PHYSICIAN ASSISTANT

## 2019-04-22 PROCEDURE — 87086 URINE CULTURE/COLONY COUNT: CPT | Performed by: PHYSICIAN ASSISTANT

## 2019-04-22 PROCEDURE — 25010000002 MORPHINE PER 10 MG: Performed by: EMERGENCY MEDICINE

## 2019-04-22 PROCEDURE — 87186 SC STD MICRODIL/AGAR DIL: CPT | Performed by: PHYSICIAN ASSISTANT

## 2019-04-22 PROCEDURE — 84484 ASSAY OF TROPONIN QUANT: CPT | Performed by: PHYSICIAN ASSISTANT

## 2019-04-22 PROCEDURE — 93010 ELECTROCARDIOGRAM REPORT: CPT | Performed by: INTERNAL MEDICINE

## 2019-04-22 PROCEDURE — 83605 ASSAY OF LACTIC ACID: CPT | Performed by: PHYSICIAN ASSISTANT

## 2019-04-22 PROCEDURE — 80053 COMPREHEN METABOLIC PANEL: CPT | Performed by: PHYSICIAN ASSISTANT

## 2019-04-22 PROCEDURE — 81001 URINALYSIS AUTO W/SCOPE: CPT | Performed by: PHYSICIAN ASSISTANT

## 2019-04-22 PROCEDURE — 85025 COMPLETE CBC W/AUTO DIFF WBC: CPT | Performed by: PHYSICIAN ASSISTANT

## 2019-04-22 PROCEDURE — 96365 THER/PROPH/DIAG IV INF INIT: CPT

## 2019-04-22 PROCEDURE — 74177 CT ABD & PELVIS W/CONTRAST: CPT | Performed by: RADIOLOGY

## 2019-04-22 PROCEDURE — 74177 CT ABD & PELVIS W/CONTRAST: CPT

## 2019-04-22 PROCEDURE — 82150 ASSAY OF AMYLASE: CPT | Performed by: PHYSICIAN ASSISTANT

## 2019-04-22 PROCEDURE — 25010000002 IOPAMIDOL 61 % SOLUTION: Performed by: EMERGENCY MEDICINE

## 2019-04-22 PROCEDURE — 93005 ELECTROCARDIOGRAM TRACING: CPT | Performed by: PHYSICIAN ASSISTANT

## 2019-04-22 RX ORDER — ONDANSETRON 2 MG/ML
4 INJECTION INTRAMUSCULAR; INTRAVENOUS ONCE
Status: COMPLETED | OUTPATIENT
Start: 2019-04-22 | End: 2019-04-22

## 2019-04-22 RX ORDER — SODIUM CHLORIDE 0.9 % (FLUSH) 0.9 %
10 SYRINGE (ML) INJECTION AS NEEDED
Status: DISCONTINUED | OUTPATIENT
Start: 2019-04-22 | End: 2019-04-24 | Stop reason: HOSPADM

## 2019-04-22 RX ORDER — MORPHINE SULFATE 2 MG/ML
2 INJECTION, SOLUTION INTRAMUSCULAR; INTRAVENOUS ONCE
Status: COMPLETED | OUTPATIENT
Start: 2019-04-22 | End: 2019-04-22

## 2019-04-22 RX ADMIN — CEFTRIAXONE 1 G: 1 INJECTION, POWDER, FOR SOLUTION INTRAMUSCULAR; INTRAVENOUS at 23:31

## 2019-04-22 RX ADMIN — IOPAMIDOL 100 ML: 612 INJECTION, SOLUTION INTRAVENOUS at 22:56

## 2019-04-22 RX ADMIN — ONDANSETRON 4 MG: 2 INJECTION, SOLUTION INTRAMUSCULAR; INTRAVENOUS at 21:33

## 2019-04-22 RX ADMIN — MORPHINE SULFATE 2 MG: 2 INJECTION, SOLUTION INTRAMUSCULAR; INTRAVENOUS at 21:33

## 2019-04-22 RX ADMIN — SODIUM CHLORIDE 500 ML: 9 INJECTION, SOLUTION INTRAVENOUS at 21:35

## 2019-04-23 ENCOUNTER — ANESTHESIA (OUTPATIENT)
Dept: PERIOP | Facility: HOSPITAL | Age: 79
End: 2019-04-23

## 2019-04-23 ENCOUNTER — ANESTHESIA EVENT (OUTPATIENT)
Dept: PERIOP | Facility: HOSPITAL | Age: 79
End: 2019-04-23

## 2019-04-23 PROCEDURE — 25010000002 ONDANSETRON PER 1 MG: Performed by: NURSE ANESTHETIST, CERTIFIED REGISTERED

## 2019-04-23 PROCEDURE — 63710000001 VITAMIN C 500 MG TABLET: Performed by: SURGERY

## 2019-04-23 PROCEDURE — 88305 TISSUE EXAM BY PATHOLOGIST: CPT | Performed by: SURGERY

## 2019-04-23 PROCEDURE — G0378 HOSPITAL OBSERVATION PER HR: HCPCS

## 2019-04-23 PROCEDURE — A9270 NON-COVERED ITEM OR SERVICE: HCPCS | Performed by: SURGERY

## 2019-04-23 PROCEDURE — 25010000002 PHENYLEPHRINE PER 1 ML: Performed by: NURSE ANESTHETIST, CERTIFIED REGISTERED

## 2019-04-23 PROCEDURE — 99220 PR INITIAL OBSERVATION CARE/DAY 70 MINUTES: CPT | Performed by: SURGERY

## 2019-04-23 PROCEDURE — 88304 TISSUE EXAM BY PATHOLOGIST: CPT | Performed by: SURGERY

## 2019-04-23 PROCEDURE — 25010000002 NEOSTIGMINE 10 MG/10ML SOLUTION: Performed by: NURSE ANESTHETIST, CERTIFIED REGISTERED

## 2019-04-23 PROCEDURE — 63710000001 METOPROLOL TARTRATE 25 MG TABLET: Performed by: SURGERY

## 2019-04-23 PROCEDURE — 25010000002 ENOXAPARIN PER 10 MG: Performed by: SURGERY

## 2019-04-23 PROCEDURE — 25010000002 FENTANYL CITRATE (PF) 100 MCG/2ML SOLUTION: Performed by: NURSE ANESTHETIST, CERTIFIED REGISTERED

## 2019-04-23 PROCEDURE — 25010000002 PROPOFOL 10 MG/ML EMULSION: Performed by: NURSE ANESTHETIST, CERTIFIED REGISTERED

## 2019-04-23 PROCEDURE — 63710000001 CHOLECALCIFEROL 400 UNITS TABLET: Performed by: SURGERY

## 2019-04-23 PROCEDURE — 63710000001 ATORVASTATIN 20 MG TABLET: Performed by: SURGERY

## 2019-04-23 PROCEDURE — 25010000002 ONDANSETRON PER 1 MG: Performed by: SURGERY

## 2019-04-23 PROCEDURE — 44970 LAPAROSCOPY APPENDECTOMY: CPT | Performed by: SURGERY

## 2019-04-23 PROCEDURE — 25010000002 PIPERACILLIN-TAZOBACTAM: Performed by: SURGERY

## 2019-04-23 DEVICE — THE ECHELON, ECHELON ENDOPATH™ AND ECHELON FLEX™ FAMILIES OF ENDOSCOPIC LINEAR CUTTERS AND RELOADS ARE STERILE, SINGLE PATIENT USE INSTRUMENTS THAT SIMULTANEOUSLY CUT AND STAPLE TISSUE. THERE ARE SIX STAGGERED ROWS OF STAPLES, THREE ON EITHER SIDE OF THE CUT LINE. THE 45 MM INSTRUMENTS HAVE A STAPLE LINE THATIS APPROXIMATELY 45 MM LONG AND A CUT LINE THAT IS APPROXIMATELY 42 MM LONG. THE SHAFT CAN ROTATE FREELY IN BOTH DIRECTIONS AND AN ARTICULATION MECHANISM ON ARTICULATING INSTRUMENTS ENABLES BENDING THE DISTAL PORTIONOF THE SHAFT TO FACILITATE LATERAL ACCESS OF THE OPERATIVE SITE.THE INSTRUMENTS ARE SHIPPED WITHOUT A RELOAD AND MUST BE LOADED PRIOR TO USE. A STAPLE RETAINING CAP ON THE RELOAD PROTECTS THE STAPLE LEG POINTS DURING SHIPPING AND TRANSPORTATION. THE INSTRUMENTS’ LOCK-OUT FEATURE IS DESIGNED TO PREVENT A USED RELOAD FROM BEING REFIRED.
Type: IMPLANTABLE DEVICE | Status: FUNCTIONAL
Brand: ECHELON ENDOPATH

## 2019-04-23 RX ORDER — SODIUM CHLORIDE 0.9 % (FLUSH) 0.9 %
3-10 SYRINGE (ML) INJECTION AS NEEDED
Status: DISCONTINUED | OUTPATIENT
Start: 2019-04-23 | End: 2019-04-23 | Stop reason: HOSPADM

## 2019-04-23 RX ORDER — BUPIVACAINE HYDROCHLORIDE AND EPINEPHRINE 5; 5 MG/ML; UG/ML
INJECTION, SOLUTION EPIDURAL; INTRACAUDAL; PERINEURAL AS NEEDED
Status: DISCONTINUED | OUTPATIENT
Start: 2019-04-23 | End: 2019-04-23 | Stop reason: HOSPADM

## 2019-04-23 RX ORDER — ONDANSETRON 2 MG/ML
4 INJECTION INTRAMUSCULAR; INTRAVENOUS EVERY 6 HOURS PRN
Status: DISCONTINUED | OUTPATIENT
Start: 2019-04-23 | End: 2019-04-24 | Stop reason: HOSPADM

## 2019-04-23 RX ORDER — LIDOCAINE HYDROCHLORIDE 20 MG/ML
INJECTION, SOLUTION INFILTRATION; PERINEURAL AS NEEDED
Status: DISCONTINUED | OUTPATIENT
Start: 2019-04-23 | End: 2019-04-23 | Stop reason: SURG

## 2019-04-23 RX ORDER — ASCORBIC ACID 500 MG
500 TABLET ORAL DAILY
Status: CANCELLED | OUTPATIENT
Start: 2019-04-23

## 2019-04-23 RX ORDER — OMEGA-3S/DHA/EPA/FISH OIL/D3 300MG-1000
1000 CAPSULE ORAL DAILY
Status: DISCONTINUED | OUTPATIENT
Start: 2019-04-23 | End: 2019-04-24 | Stop reason: HOSPADM

## 2019-04-23 RX ORDER — MAGNESIUM HYDROXIDE 1200 MG/15ML
LIQUID ORAL AS NEEDED
Status: DISCONTINUED | OUTPATIENT
Start: 2019-04-23 | End: 2019-04-23 | Stop reason: HOSPADM

## 2019-04-23 RX ORDER — FENTANYL CITRATE 50 UG/ML
50 INJECTION, SOLUTION INTRAMUSCULAR; INTRAVENOUS
Status: DISCONTINUED | OUTPATIENT
Start: 2019-04-23 | End: 2019-04-23 | Stop reason: HOSPADM

## 2019-04-23 RX ORDER — ATORVASTATIN CALCIUM 20 MG/1
20 TABLET, FILM COATED ORAL DAILY
COMMUNITY

## 2019-04-23 RX ORDER — ONDANSETRON 2 MG/ML
4 INJECTION INTRAMUSCULAR; INTRAVENOUS ONCE AS NEEDED
Status: DISCONTINUED | OUTPATIENT
Start: 2019-04-23 | End: 2019-04-23 | Stop reason: HOSPADM

## 2019-04-23 RX ORDER — OXYCODONE HYDROCHLORIDE AND ACETAMINOPHEN 5; 325 MG/1; MG/1
1 TABLET ORAL ONCE AS NEEDED
Status: DISCONTINUED | OUTPATIENT
Start: 2019-04-23 | End: 2019-04-23 | Stop reason: HOSPADM

## 2019-04-23 RX ORDER — METOPROLOL TARTRATE 5 MG/5ML
INJECTION INTRAVENOUS AS NEEDED
Status: DISCONTINUED | OUTPATIENT
Start: 2019-04-23 | End: 2019-04-23 | Stop reason: SURG

## 2019-04-23 RX ORDER — SODIUM CHLORIDE 0.9 % (FLUSH) 0.9 %
3 SYRINGE (ML) INJECTION EVERY 12 HOURS SCHEDULED
Status: DISCONTINUED | OUTPATIENT
Start: 2019-04-23 | End: 2019-04-23 | Stop reason: HOSPADM

## 2019-04-23 RX ORDER — OMEGA-3S/DHA/EPA/FISH OIL/D3 300MG-1000
1000 CAPSULE ORAL DAILY
Status: CANCELLED | OUTPATIENT
Start: 2019-04-23

## 2019-04-23 RX ORDER — SODIUM CHLORIDE 9 MG/ML
50 INJECTION, SOLUTION INTRAVENOUS CONTINUOUS
Status: DISCONTINUED | OUTPATIENT
Start: 2019-04-23 | End: 2019-04-24 | Stop reason: HOSPADM

## 2019-04-23 RX ORDER — GLYCOPYRROLATE 0.2 MG/ML
INJECTION INTRAMUSCULAR; INTRAVENOUS AS NEEDED
Status: DISCONTINUED | OUTPATIENT
Start: 2019-04-23 | End: 2019-04-23 | Stop reason: SURG

## 2019-04-23 RX ORDER — MEPERIDINE HYDROCHLORIDE 25 MG/ML
12.5 INJECTION INTRAMUSCULAR; INTRAVENOUS; SUBCUTANEOUS
Status: DISCONTINUED | OUTPATIENT
Start: 2019-04-23 | End: 2019-04-23 | Stop reason: HOSPADM

## 2019-04-23 RX ORDER — ONDANSETRON 2 MG/ML
INJECTION INTRAMUSCULAR; INTRAVENOUS AS NEEDED
Status: DISCONTINUED | OUTPATIENT
Start: 2019-04-23 | End: 2019-04-23 | Stop reason: SURG

## 2019-04-23 RX ORDER — IPRATROPIUM BROMIDE AND ALBUTEROL SULFATE 2.5; .5 MG/3ML; MG/3ML
3 SOLUTION RESPIRATORY (INHALATION) ONCE AS NEEDED
Status: DISCONTINUED | OUTPATIENT
Start: 2019-04-23 | End: 2019-04-23 | Stop reason: HOSPADM

## 2019-04-23 RX ORDER — PROPOFOL 10 MG/ML
VIAL (ML) INTRAVENOUS AS NEEDED
Status: DISCONTINUED | OUTPATIENT
Start: 2019-04-23 | End: 2019-04-23 | Stop reason: SURG

## 2019-04-23 RX ORDER — NEOSTIGMINE METHYLSULFATE 1 MG/ML
INJECTION, SOLUTION INTRAVENOUS AS NEEDED
Status: DISCONTINUED | OUTPATIENT
Start: 2019-04-23 | End: 2019-04-23 | Stop reason: SURG

## 2019-04-23 RX ORDER — MINERAL OIL 471.99 G/472ML
OIL TOPICAL AS NEEDED
Status: DISCONTINUED | OUTPATIENT
Start: 2019-04-23 | End: 2019-04-23 | Stop reason: HOSPADM

## 2019-04-23 RX ORDER — FAMOTIDINE 10 MG/ML
INJECTION, SOLUTION INTRAVENOUS AS NEEDED
Status: DISCONTINUED | OUTPATIENT
Start: 2019-04-23 | End: 2019-04-23 | Stop reason: SURG

## 2019-04-23 RX ORDER — MELATONIN
1000 NIGHTLY
COMMUNITY

## 2019-04-23 RX ORDER — ATORVASTATIN CALCIUM 20 MG/1
20 TABLET, FILM COATED ORAL NIGHTLY
Status: DISCONTINUED | OUTPATIENT
Start: 2019-04-23 | End: 2019-04-24 | Stop reason: HOSPADM

## 2019-04-23 RX ORDER — SODIUM CHLORIDE, SODIUM LACTATE, POTASSIUM CHLORIDE, CALCIUM CHLORIDE 600; 310; 30; 20 MG/100ML; MG/100ML; MG/100ML; MG/100ML
125 INJECTION, SOLUTION INTRAVENOUS CONTINUOUS
Status: DISCONTINUED | OUTPATIENT
Start: 2019-04-23 | End: 2019-04-24 | Stop reason: HOSPADM

## 2019-04-23 RX ORDER — SODIUM CHLORIDE 9 MG/ML
INJECTION, SOLUTION INTRAVENOUS AS NEEDED
Status: DISCONTINUED | OUTPATIENT
Start: 2019-04-23 | End: 2019-04-23 | Stop reason: HOSPADM

## 2019-04-23 RX ORDER — ASCORBIC ACID 500 MG
500 TABLET ORAL DAILY
Status: DISCONTINUED | OUTPATIENT
Start: 2019-04-23 | End: 2019-04-24 | Stop reason: HOSPADM

## 2019-04-23 RX ORDER — ASCORBIC ACID 500 MG
500 TABLET ORAL NIGHTLY
COMMUNITY

## 2019-04-23 RX ORDER — ROCURONIUM BROMIDE 10 MG/ML
INJECTION, SOLUTION INTRAVENOUS AS NEEDED
Status: DISCONTINUED | OUTPATIENT
Start: 2019-04-23 | End: 2019-04-23 | Stop reason: SURG

## 2019-04-23 RX ORDER — ATORVASTATIN CALCIUM 20 MG/1
20 TABLET, FILM COATED ORAL NIGHTLY
Status: CANCELLED | OUTPATIENT
Start: 2019-04-23

## 2019-04-23 RX ADMIN — FENTANYL CITRATE 50 MCG: 50 INJECTION INTRAMUSCULAR; INTRAVENOUS at 14:15

## 2019-04-23 RX ADMIN — PIPERACILLIN SODIUM,TAZOBACTAM SODIUM 3.38 G: 3; .375 INJECTION, POWDER, FOR SOLUTION INTRAVENOUS at 04:00

## 2019-04-23 RX ADMIN — OXYCODONE HYDROCHLORIDE AND ACETAMINOPHEN 500 MG: 500 TABLET ORAL at 17:10

## 2019-04-23 RX ADMIN — PHENYLEPHRINE HYDROCHLORIDE 100 MCG: 10 INJECTION, SOLUTION INTRAMUSCULAR; INTRAVENOUS; SUBCUTANEOUS at 12:35

## 2019-04-23 RX ADMIN — LIDOCAINE HYDROCHLORIDE 60 MG: 20 INJECTION, SOLUTION INFILTRATION; PERINEURAL at 12:30

## 2019-04-23 RX ADMIN — NEOSTIGMINE METHYLSULFATE 3 MG: 1 INJECTION, SOLUTION INTRAVENOUS at 13:25

## 2019-04-23 RX ADMIN — PHENYLEPHRINE HYDROCHLORIDE 100 MCG: 10 INJECTION, SOLUTION INTRAMUSCULAR; INTRAVENOUS; SUBCUTANEOUS at 12:31

## 2019-04-23 RX ADMIN — PIPERACILLIN SODIUM,TAZOBACTAM SODIUM 3.38 G: 3; .375 INJECTION, POWDER, FOR SOLUTION INTRAVENOUS at 15:35

## 2019-04-23 RX ADMIN — EPHEDRINE SULFATE 10 MG: 50 INJECTION INTRAMUSCULAR; INTRAVENOUS; SUBCUTANEOUS at 12:38

## 2019-04-23 RX ADMIN — CHOLECALCIFEROL TAB 10 MCG (400 UNIT) 1000 UNITS: 10 TAB at 17:10

## 2019-04-23 RX ADMIN — PIPERACILLIN SODIUM,TAZOBACTAM SODIUM 3.38 G: 3; .375 INJECTION, POWDER, FOR SOLUTION INTRAVENOUS at 08:31

## 2019-04-23 RX ADMIN — PROPOFOL 120 MG: 10 INJECTION, EMULSION INTRAVENOUS at 12:30

## 2019-04-23 RX ADMIN — GLYCOPYRROLATE 0.4 MG: 0.2 INJECTION, SOLUTION INTRAMUSCULAR; INTRAVENOUS at 13:25

## 2019-04-23 RX ADMIN — METOPROLOL TARTRATE 25 MG: 25 TABLET, FILM COATED ORAL at 17:11

## 2019-04-23 RX ADMIN — SODIUM CHLORIDE 50 ML/HR: 9 INJECTION, SOLUTION INTRAVENOUS at 05:55

## 2019-04-23 RX ADMIN — ONDANSETRON 4 MG: 2 INJECTION, SOLUTION INTRAMUSCULAR; INTRAVENOUS at 12:30

## 2019-04-23 RX ADMIN — SODIUM CHLORIDE 50 ML/HR: 9 INJECTION, SOLUTION INTRAVENOUS at 19:20

## 2019-04-23 RX ADMIN — FENTANYL CITRATE 50 MCG: 50 INJECTION INTRAMUSCULAR; INTRAVENOUS at 13:52

## 2019-04-23 RX ADMIN — SODIUM CHLORIDE, POTASSIUM CHLORIDE, SODIUM LACTATE AND CALCIUM CHLORIDE: 600; 310; 30; 20 INJECTION, SOLUTION INTRAVENOUS at 12:27

## 2019-04-23 RX ADMIN — ROCURONIUM BROMIDE 30 MG: 10 INJECTION INTRAVENOUS at 12:30

## 2019-04-23 RX ADMIN — ENOXAPARIN SODIUM 40 MG: 40 INJECTION SUBCUTANEOUS at 08:31

## 2019-04-23 RX ADMIN — ONDANSETRON 4 MG: 2 INJECTION, SOLUTION INTRAMUSCULAR; INTRAVENOUS at 16:16

## 2019-04-23 RX ADMIN — PIPERACILLIN SODIUM,TAZOBACTAM SODIUM 3.38 G: 3; .375 INJECTION, POWDER, FOR SOLUTION INTRAVENOUS at 19:20

## 2019-04-23 RX ADMIN — FAMOTIDINE 20 MG: 10 INJECTION, SOLUTION INTRAVENOUS at 12:30

## 2019-04-23 RX ADMIN — ATORVASTATIN CALCIUM 20 MG: 20 TABLET, FILM COATED ORAL at 19:20

## 2019-04-23 RX ADMIN — METOPROLOL TARTRATE 1 MG: 1 INJECTION, SOLUTION INTRAVENOUS at 12:49

## 2019-04-23 NOTE — ANESTHESIA PREPROCEDURE EVALUATION
Anesthesia Evaluation     no history of anesthetic complications:  NPO Solid Status: > 8 hours  NPO Liquid Status: > 8 hours           Airway   Mallampati: II  TM distance: >3 FB  Neck ROM: full  No difficulty expected  Dental    (+) edentulous    Pulmonary - normal exam   (+) COPD,   Cardiovascular - normal exam    (+) hypertension, valvular problems/murmurs AS, hyperlipidemia,       Neuro/Psych  GI/Hepatic/Renal/Endo      Musculoskeletal     Abdominal  - normal exam   Substance History      OB/GYN          Other                        Anesthesia Plan    ASA 3     general     intravenous induction   Anesthetic plan, all risks, benefits, and alternatives have been provided, discussed and informed consent has been obtained with: patient.

## 2019-04-23 NOTE — ANESTHESIA POSTPROCEDURE EVALUATION
Patient: Elizabeth Caceres    Procedure Summary     Date:  04/23/19 Room / Location:  [facility] OR 02 / [facility] OR    Anesthesia Start:  1227 Anesthesia Stop:  1336    Procedures:       APPENDECTOMY LAPAROSCOPIC (N/A Abdomen)      LAPAROSCOPIC SALPINGOOPHORECTOMY (Right ) Diagnosis:       Acute appendicitis with localized peritonitis, without perforation, abscess, or gangrene      (Acute appendicitis with localized peritonitis, without perforation, abscess, or gangrene [K35.30])    Surgeon:  Peter Vargas MD Provider:  Dimas Murray MD    Anesthesia Type:  general ASA Status:  3          Anesthesia Type: general  Last vitals  BP   105/65 (04/23/19 1428)   Temp   98.5 °F (36.9 °C) (04/23/19 1428)   Pulse   80 (04/23/19 1428)   Resp   16 (04/23/19 1428)     SpO2   100 % (04/23/19 1428)     Post Anesthesia Care and Evaluation    Patient location during evaluation: PHASE II  Patient participation: complete - patient participated  Level of consciousness: awake and alert  Pain score: 1  Pain management: adequate  Airway patency: patent  Anesthetic complications: No anesthetic complications  PONV Status: controlled  Cardiovascular status: acceptable  Respiratory status: acceptable  Hydration status: acceptable

## 2019-04-23 NOTE — ANESTHESIA PROCEDURE NOTES
Airway  Urgency: elective    Date/Time: 4/23/2019 12:31 PM  Airway not difficult    General Information and Staff    Patient location during procedure: OR  Anesthesiologist: Dimas Murray MD  CRNA: Venkat Maradiaga CRNA    Indications and Patient Condition  Indications for airway management: airway protection    Preoxygenated: yes  MILS maintained throughout  Mask difficulty assessment: 0 - not attempted    Final Airway Details  Final airway type: endotracheal airway      Successful airway: ETT  Cuffed: yes   Successful intubation technique: direct laryngoscopy  Facilitating devices/methods: intubating stylet  Endotracheal tube insertion site: oral  Blade: Urban  Blade size: 3  ETT size (mm): 7.5  Cormack-Lehane Classification: grade I - full view of glottis  Placement verified by: chest auscultation, capnometry and palpation of cuff   Cuff volume (mL): 10  Measured from: lips  ETT to lips (cm): 21  Number of attempts at approach: 1    Additional Comments  Dentition and lips same as preop

## 2019-04-24 VITALS
DIASTOLIC BLOOD PRESSURE: 55 MMHG | HEART RATE: 77 BPM | HEIGHT: 63 IN | WEIGHT: 159 LBS | BODY MASS INDEX: 28.17 KG/M2 | SYSTOLIC BLOOD PRESSURE: 105 MMHG | RESPIRATION RATE: 18 BRPM | OXYGEN SATURATION: 95 % | TEMPERATURE: 98.5 F

## 2019-04-24 PROBLEM — K35.30 ACUTE APPENDICITIS WITH LOCALIZED PERITONITIS, WITHOUT PERFORATION, ABSCESS, OR GANGRENE: Status: RESOLVED | Noted: 2019-04-22 | Resolved: 2019-04-24

## 2019-04-24 LAB — BACTERIA SPEC AEROBE CULT: ABNORMAL

## 2019-04-24 PROCEDURE — 25010000002 ENOXAPARIN PER 10 MG: Performed by: SURGERY

## 2019-04-24 PROCEDURE — 99024 POSTOP FOLLOW-UP VISIT: CPT | Performed by: SURGERY

## 2019-04-24 PROCEDURE — G0378 HOSPITAL OBSERVATION PER HR: HCPCS

## 2019-04-24 PROCEDURE — A9270 NON-COVERED ITEM OR SERVICE: HCPCS | Performed by: SURGERY

## 2019-04-24 PROCEDURE — 63710000001 CHOLECALCIFEROL 400 UNITS TABLET: Performed by: SURGERY

## 2019-04-24 PROCEDURE — 25010000002 PIPERACILLIN-TAZOBACTAM: Performed by: SURGERY

## 2019-04-24 PROCEDURE — 63710000001 VITAMIN C 500 MG TABLET: Performed by: SURGERY

## 2019-04-24 PROCEDURE — 25010000002 MORPHINE PER 10 MG: Performed by: SURGERY

## 2019-04-24 RX ORDER — OXYCODONE HYDROCHLORIDE AND ACETAMINOPHEN 5; 325 MG/1; MG/1
1-2 TABLET ORAL EVERY 4 HOURS PRN
Qty: 10 TABLET | Refills: 0 | Status: SHIPPED | OUTPATIENT
Start: 2019-04-24 | End: 2019-04-30

## 2019-04-24 RX ADMIN — SODIUM CHLORIDE, PRESERVATIVE FREE 10 ML: 5 INJECTION INTRAVENOUS at 08:46

## 2019-04-24 RX ADMIN — CHOLECALCIFEROL TAB 10 MCG (400 UNIT) 1000 UNITS: 10 TAB at 08:45

## 2019-04-24 RX ADMIN — MORPHINE SULFATE 4 MG: 4 INJECTION INTRAVENOUS at 08:46

## 2019-04-24 RX ADMIN — ENOXAPARIN SODIUM 40 MG: 40 INJECTION SUBCUTANEOUS at 08:46

## 2019-04-24 RX ADMIN — OXYCODONE HYDROCHLORIDE AND ACETAMINOPHEN 500 MG: 500 TABLET ORAL at 08:46

## 2019-04-24 RX ADMIN — PIPERACILLIN SODIUM,TAZOBACTAM SODIUM 3.38 G: 3; .375 INJECTION, POWDER, FOR SOLUTION INTRAVENOUS at 02:14

## 2019-04-24 RX ADMIN — PIPERACILLIN SODIUM,TAZOBACTAM SODIUM 3.38 G: 3; .375 INJECTION, POWDER, FOR SOLUTION INTRAVENOUS at 08:54

## 2019-04-25 LAB
LAB AP CASE REPORT: NORMAL
PATH REPORT.FINAL DX SPEC: NORMAL

## 2019-04-27 LAB
BACTERIA SPEC AEROBE CULT: NORMAL
BACTERIA SPEC AEROBE CULT: NORMAL

## 2019-04-30 ENCOUNTER — OFFICE VISIT (OUTPATIENT)
Dept: SURGERY | Facility: CLINIC | Age: 79
End: 2019-04-30

## 2019-04-30 VITALS
HEART RATE: 80 BPM | WEIGHT: 160 LBS | TEMPERATURE: 98 F | RESPIRATION RATE: 18 BRPM | SYSTOLIC BLOOD PRESSURE: 118 MMHG | OXYGEN SATURATION: 99 % | DIASTOLIC BLOOD PRESSURE: 78 MMHG | BODY MASS INDEX: 28.35 KG/M2 | HEIGHT: 63 IN

## 2019-04-30 DIAGNOSIS — N70.93 SALPINGITIS AND OOPHORITIS, UNSPECIFIED: Primary | ICD-10-CM

## 2019-04-30 PROCEDURE — 99024 POSTOP FOLLOW-UP VISIT: CPT | Performed by: SURGERY

## 2019-04-30 NOTE — PROGRESS NOTES
2019    Patient Information  Elizabeth Caceres  [address]  [place] [address]  1940  [phone] (home)     Chief Complaint   Patient presents with   • Post-op     STATUS POST LAP APPY       HPI  Patient is a 79-year-old white female here for follow-up on infected right tube and ovary.  Also performed the appendectomy on her.  Her appendix is normal except for some periappendicitis from the tube infection.  She is doing well          Patient Active Problem List   Diagnosis   • Valvular heart disease   • Hypertension   • Dyslipidemia   • Vitamin D deficiency   • Vitamin C deficiency   • H/O syncope         Past Medical History:   Diagnosis Date   • Arthritis    • COPD (chronic obstructive pulmonary disease) (CMS/[facility])    • Elevated cholesterol    • Hyperlipidemia    • Hypertension    • MRSA (methicillin resistant Staphylococcus aureus)     History of MRSA with I&D of lower extremity   • Urinary frequency          Past Surgical History:   Procedure Laterality Date   • AORTIC VALVE REPAIR/REPLACEMENT  10/2013    Dr. Sebastian   • APPENDECTOMY N/A 2019    Procedure: APPENDECTOMY LAPAROSCOPIC;  Surgeon: Peter Vargas MD;  Location: [facility];  Service: General   • CARDIAC SURGERY     • COLONOSCOPY     • HIP ARTHROPLASTY Left    • TOTAL LAPAROSCOPIC HYSTERECTOMY SALPINGO OOPHORECTOMY Right 2019    Procedure: LAPAROSCOPIC SALPINGOOPHORECTOMY;  Surgeon: Peter Vargas MD;  Location: [facility];  Service: General         History reviewed. No pertinent family history.      Social History     Tobacco Use   • Smoking status: Former Smoker     Last attempt to quit: 1987     Years since quittin.3   • Smokeless tobacco: Never Used   Substance Use Topics   • Alcohol use: No   • Drug use: No       Current Outpatient Medications   Medication Sig Dispense Refill   • atorvastatin (LIPITOR) 20 MG tablet Take 20 mg by mouth Every Night.     • cholecalciferol (VITAMIN D3) 1000 units tablet Take 1,000  "Units by mouth Daily.     • metoprolol tartrate (LOPRESSOR) 25 MG tablet Take 1 tablet by mouth 2 (Two) Times a Day. 180 tablet 1   • vitamin C (ASCORBIC ACID) 500 MG tablet Take 500 mg by mouth Daily.       No current facility-administered medications for this visit.          Allergies  Patient has no known allergies.    /78   Pulse 80   Temp 98 °F (36.7 °C) (Oral)   Resp 18   Ht 160 cm (62.99\")   Wt 72.6 kg (160 lb)   LMP  (LMP Unknown)   SpO2 99%   BMI 28.35 kg/m²      Physical Exam    General :  Patient is a 79 y.o. female in no acute distress.    HEENT:  Normocephalic, pupils equally round and reactive to light, extraocular motions intact.  Chest:  Clear bilaterally. Equal breath sounds. No rales or rhonchi.  Heart:   Regular rate and rhythm.  Abdomen:  Soft, nontender. No distention.  :  Normal external genitalia.  Rectal:  Not performed.  Extremities:  No clubbing cyanosis or edema. Good femoral, popliteal, dorsalis pedis and posterior tibial pulse.  Neurologic:  Patient oriented ×3. Cranial nerves grossly intact. No sensory,cellebellar, or motor dysfunction.      Is a good          ASSESSMENT  Status post right salpingo-oophorectomy and appendectomy doing well        PLAN    Return as needed    Patient's Body mass index is 28.35 kg/m². BMI is above normal parameters. Recommendations include: educational material.           This document signed by Peter Vargas MD April 30, 2019 2:10 PM   "

## 2019-11-26 ENCOUNTER — OFFICE VISIT (OUTPATIENT)
Dept: CARDIOLOGY | Facility: CLINIC | Age: 79
End: 2019-11-26

## 2019-11-26 VITALS
WEIGHT: 156 LBS | BODY MASS INDEX: 27.64 KG/M2 | SYSTOLIC BLOOD PRESSURE: 164 MMHG | HEIGHT: 63 IN | DIASTOLIC BLOOD PRESSURE: 74 MMHG | HEART RATE: 72 BPM

## 2019-11-26 DIAGNOSIS — I38 VALVULAR HEART DISEASE: Primary | ICD-10-CM

## 2019-11-26 DIAGNOSIS — I10 ESSENTIAL HYPERTENSION: ICD-10-CM

## 2019-11-26 DIAGNOSIS — E78.2 MIXED HYPERLIPIDEMIA: ICD-10-CM

## 2019-11-26 PROBLEM — E78.5 HYPERLIPIDEMIA: Status: ACTIVE | Noted: 2019-11-26

## 2019-11-26 PROBLEM — J44.9 COPD (CHRONIC OBSTRUCTIVE PULMONARY DISEASE): Status: ACTIVE | Noted: 2019-11-26

## 2019-11-26 PROCEDURE — 99214 OFFICE O/P EST MOD 30 MIN: CPT | Performed by: PHYSICIAN ASSISTANT

## 2019-11-26 NOTE — PROGRESS NOTES
[facility]    Encounter Date:2019        Patient ID: Elizabeth Caceres is a 79 y.o. female.  : 1940     PCP: Eric Wei MD     Chief Complaint: Valvular heart disease      PROBLEM LIST:  1. Valvular heart disease:  a. Echocardiogram, 10/2013: Critical AS with SHARA 0.7 cm2 and mean gradient of 45 mmHg.   b. Abnormal Cardiolite stress test, 2013.    c. [place], 10/23/2013, Dr. Osman: Critical AS with gradient of 101 mm across the aortic valve.  EF 70%. No significant coronary disease.    d. AVR with bioprosthetic valve, 10/2013, Dr. Sebastian.   e. Normal MPS with no evidence of ischemia. EF 80%.  f. Echocardiogram, 10/03/2017: EF 66-70%. LV diastolic dysfunction (grade I) consistent with impaired relaxation. Bioprosthetic AV. Calcification of the aortic valve mainly affecting the non and right coronary cusp(s). Mild to moderate AS. Peak gradient of 35 and a mean gradient of 20 mm Hg. SHARA 1.0cm2, probably an underestimation. Myxomatous changes of the mitral valve apparatus. Mild MR. No evidence of pericardial effusion.  g. Echocardiogram, 10/11/2018: EF > 70% with mild concentric LVH. LV diastolic dysfunction (grade I). Mild AS, bioprosthetic AV with peak gradient 30 mmHg, mean 17. No AI. Mild TR, RVSP 37 mmHg. No evidence of pericardial effusion.  2. Hypertension.   3. Dyslipidemia.    4. History of presyncope.   5. History of MRSA with I&D of lower extremity, .   6. Vitamin D and Vitamin B12 deficiency.     History of Present Illness  Patient presents today for annual follow-up with a history of bioprosthetic aortic valve and cardiac risk factors. Since last visit, she has done very well.  She has no complaint of exertional chest pain or dyspnea no orthopnea no PND no claudication no lower extremity edema.  She has no awareness of tachyarrhythmias, no dizziness or syncope.  Her blood pressures at home generally run about 130 mmHg.  States she usually  "has some degree of whitecoat hypertension.  She had a recent lipid panel which is noted below and is highly favorable on her current medical regimen.  She quit taking aspirin because of her report she saw on TV, otherwise she is compliant with her current medical regimen.    No Known Allergies      Current Outpatient Medications:   •  atorvastatin (LIPITOR) 20 MG tablet, Take 20 mg by mouth Every Night., Disp: , Rfl:   •  cholecalciferol (VITAMIN D3) 1000 units tablet, Take 1,000 Units by mouth Daily., Disp: , Rfl:   •  metoprolol tartrate (LOPRESSOR) 25 MG tablet, Take 1 tablet by mouth 2 (Two) Times a Day., Disp: 180 tablet, Rfl: 1  •  vitamin C (ASCORBIC ACID) 500 MG tablet, Take 500 mg by mouth Daily., Disp: , Rfl:     The following portions of the patient's history were reviewed and updated as appropriate: allergies, current medications, past family history, past medical history, past social history, past surgical history and problem list.    ROS  Review of Systems   Constitution: Negative for chills, fatigue, fever, generalized weakness, weight gain and weight loss.   Cardiovascular: Negative for chest pain, claudication, dyspnea on exertion, leg swelling, orthopnea, palpitations, paroxysmal nocturnal dyspnea and syncope.   Respiratory: Negative for cough, shortness of breath, and wheezing.  HENT: Negative for ear pain, nosebleeds, and tinnitus.  Gastrointestinal: Negative for abdominal pain, constipation, diarrhea, nausea and vomiting.   Genitourinary: No urinary symptoms   Musculoskeletal: Negative for muscle cramps.  Neurological: Negative for dizziness, headaches, loss of balance, numbness, and symptoms of stroke.  Psychiatric: Normal mental status.     All other systems reviewed and are negative.    Objective:     /74 (BP Location: Right arm, Patient Position: Sitting)   Pulse 72   Ht 160 cm (63\")   Wt 70.8 kg (156 lb)   LMP  (LMP Unknown)   BMI 27.63 kg/m²          Physical " Exam  Constitutional: Patient appears well-developed and well-nourished.   HENT: HEENT exam unremarkable.   Neck: Neck supple. No JVD present. No carotid bruits.   Cardiovascular: Normal rate, regular rhythm and normal heart sounds. No murmur heard.   2+ symmetric pulses.   Pulmonary/Chest: Breath sounds normal. Does not exhibit tenderness.   Abdominal: Abdomen benign.   Musculoskeletal: Does not exhibit edema.   Neurological: Neurological exam unremarkable.   Vitals reviewed.    Lab Review:   Lab Results   Component Value Date    GLUCOSE 125 (H) 04/22/2019    BUN 20 04/22/2019    CREATININE 0.74 04/22/2019    EGFRIFNONA 76 04/22/2019    BCR 27.0 (H) 04/22/2019    K 3.9 04/22/2019    CO2 24.2 04/22/2019    CALCIUM 8.7 04/22/2019    ALBUMIN 4.07 04/22/2019    LABIL2 1.4 (L) 03/10/2015    AST 16 04/22/2019    ALT 12 04/22/2019     Lab Results   Component Value Date    WBC 9.36 04/22/2019    HGB 12.5 04/22/2019    HCT 38.0 04/22/2019    MCV 99.0 (H) 04/22/2019     04/22/2019     Last lipid panel, 03/26/2019:     TG 52  HDL 63  LDL 62    Procedures       Assessment:      Diagnosis Plan   1. Valvular heart disease   stable, no anginal or heart failure type symptoms at this time.  I do think she should restart aspirin 81 mg daily   2. Mixed hyperlipidemia  Well managed,  continue current therapy.     3. Essential hypertension   well managed on current medical regimen     Plan:     Stable cardiac status.  Continue current medications.   FU in 12 MO, sooner as needed.  Thank you for allowing us to participate in the care of your patient.       Electronically signed by NAYE Kowalski, 11/26/19, 1:45 PM.      Please note that portions of this note may have been completed with a voice recognition program. Efforts were made to edit the dictations, but occasionally words are mistranscribed.

## 2021-01-18 ENCOUNTER — IMMUNIZATION (OUTPATIENT)
Dept: VACCINE CLINIC | Facility: HOSPITAL | Age: 81
End: 2021-01-18

## 2021-01-18 PROCEDURE — 0001A: CPT | Performed by: FAMILY MEDICINE

## 2021-01-18 PROCEDURE — 91300 HC SARSCOV02 VAC 30MCG/0.3ML IM: CPT | Performed by: FAMILY MEDICINE

## 2021-02-08 ENCOUNTER — IMMUNIZATION (OUTPATIENT)
Dept: VACCINE CLINIC | Facility: HOSPITAL | Age: 81
End: 2021-02-08

## 2021-02-08 PROCEDURE — 91300 HC SARSCOV02 VAC 30MCG/0.3ML IM: CPT | Performed by: INTERNAL MEDICINE

## 2021-02-08 PROCEDURE — 0002A: CPT | Performed by: INTERNAL MEDICINE

## 2021-05-04 ENCOUNTER — OFFICE VISIT (OUTPATIENT)
Dept: CARDIOLOGY | Facility: CLINIC | Age: 81
End: 2021-05-04

## 2021-05-04 VITALS
BODY MASS INDEX: 29.23 KG/M2 | SYSTOLIC BLOOD PRESSURE: 156 MMHG | HEART RATE: 89 BPM | DIASTOLIC BLOOD PRESSURE: 84 MMHG | HEIGHT: 63 IN | WEIGHT: 165 LBS

## 2021-05-04 DIAGNOSIS — I38 VALVULAR HEART DISEASE: Primary | ICD-10-CM

## 2021-05-04 DIAGNOSIS — E78.2 MIXED HYPERLIPIDEMIA: ICD-10-CM

## 2021-05-04 DIAGNOSIS — I10 ESSENTIAL HYPERTENSION: ICD-10-CM

## 2021-05-04 PROCEDURE — 99214 OFFICE O/P EST MOD 30 MIN: CPT | Performed by: INTERNAL MEDICINE

## 2021-05-04 RX ORDER — CARVEDILOL 25 MG/1
25 TABLET ORAL 2 TIMES DAILY
Qty: 180 TABLET | Refills: 3 | Status: SHIPPED | OUTPATIENT
Start: 2021-05-04 | End: 2022-04-15

## 2021-05-04 RX ORDER — ASPIRIN 81 MG/1
81 TABLET ORAL DAILY
COMMUNITY

## 2021-05-04 NOTE — PROGRESS NOTES
[facility]    Encounter Date: 2021    Patient ID: Elizabeth Caceres is a 81 y.o. female.  : 1940     PCP: Eric Wei MD       Chief Complaint: Valvular heart disease      PROBLEM LIST:  1. Valvular heart disease:  a. Echocardiogram, 10/2013: Critical AS with SHARA 0.7 cm2 and mean gradient of 45 mmHg.   b. Abnormal Cardiolite stress test, 2013.    c. [facility], 10/23/2013, Dr. Osman: Critical AS with gradient of 101 mm across the aortic valve.  EF 70%. No significant coronary disease.    d. AVR with bioprosthetic valve, 10/2013, Dr. Sebastian.   e. Normal MPS with no evidence of ischemia. EF 80%.  f. Echocardiogram, 10/03/2017: EF 66-70%. LV diastolic dysfunction (grade I) consistent with impaired relaxation. Bioprosthetic AV. Calcification of the aortic valve mainly affecting the non and right coronary cusp(s). Mild to moderate AS. Peak gradient of 35 and a mean gradient of 20 mm Hg. SHARA 1.0cm2, probably an underestimation. Myxomatous changes of the mitral valve apparatus. Mild MR. No evidence of pericardial effusion.  g. Echocardiogram, 10/11/2018: EF > 70% with mild concentric LVH. LV diastolic dysfunction (grade I). Mild AS, bioprosthetic AV with peak gradient 30 mmHg, mean 17. No AI. Mild TR, RVSP 37 mmHg. No evidence of pericardial effusion.  2. Hypertension.   3. Dyslipidemia.    4. History of presyncope.   5. History of MRSA with I&D of lower extremity, .   6. Vitamin D and Vitamin B12 deficiency.     History of Present Illness  Patient presents today for a follow-up with a history of valvular heart disease and cardiac risk factors. Since last visit, she has been feeling well overall from a cardiovascular standpoint. She notes that she has not returned for an appointment since over a year ago due to the COVID-19 pandemic. She occasionally checks her blood pressure and it is usually normal. She still follows up with Dr. Wei routinely. Patient  "denies chest pain, shortness of breath, palpitations, edema, dizziness, and syncope.       No Known Allergies      Current Outpatient Medications:   •  aspirin 81 MG EC tablet, Take 81 mg by mouth Daily., Disp: , Rfl:   •  atorvastatin (LIPITOR) 20 MG tablet, Take 20 mg by mouth Every Night., Disp: , Rfl:   •  cholecalciferol (VITAMIN D3) 1000 units tablet, Take 1,000 Units by mouth Daily., Disp: , Rfl:   •  vitamin C (ASCORBIC ACID) 500 MG tablet, Take 500 mg by mouth Daily., Disp: , Rfl:   •  carvedilol (COREG) 25 MG tablet, Take 1 tablet by mouth 2 (Two) Times a Day., Disp: 180 tablet, Rfl: 3    The following portions of the patient's history were reviewed and updated as appropriate: allergies, current medications, past family history, past medical history, past social history, past surgical history and problem list.    ROS  Review of Systems   Constitution: Negative for chills, fever, fatigue, generalized weakness.   Cardiovascular: Negative for chest pain, dyspnea on exertion, leg swelling, palpitations, orthopnea, and syncope.   Respiratory: Negative for cough, shortness of breath, and wheezing.  HENT: Negative for ear pain, nosebleeds, and tinnitus.  Gastrointestinal: Negative for abdominal pain, constipation, diarrhea, nausea and vomiting.   Genitourinary: No urinary symptoms.  Musculoskeletal: Negative for muscle cramps.  Neurological: Negative for dizziness, headaches, loss of balance, numbness, and symptoms of stroke.  Psychiatric: Normal mental status.     All other systems reviewed and are negative.        Objective:     /84 (BP Location: Left arm, Patient Position: Sitting)   Pulse 89   Ht 160 cm (63\")   Wt 74.8 kg (165 lb)   LMP  (LMP Unknown)   BMI 29.23 kg/m²    Repeat BP: 164/70    Physical Exam  Constitutional: Patient appears well-developed and well-nourished.   HENT: HEENT exam unremarkable.   Neck: Neck supple. No JVD present. No carotid bruits.   Cardiovascular: Normal rate, regular " rhythm and normal heart sounds. 3/6 murmur.   2+ symmetric pulses.   Pulmonary/Chest: Breath sounds normal. Does not exhibit tenderness.   Abdominal: Abdomen benign.   Musculoskeletal: Does not exhibit edema.   Neurological: Neurological exam unremarkable.   Vitals reviewed.    Data Review:   Lab date: 03/26/2019  • FLP: , TG 52, HDL 63, LDL 62       Procedures       Assessment:      Diagnosis Plan   1. Valvular heart disease, status post tissue AVR with progressive aortic stenosis of bioprosthetic valve. Echocardiogram ordered. Asymptomatic; continue current medications.    2. Essential hypertension  Poor control; discontinue metoprolol tartrate 25 mg BID and begin carvedilol 25 mg BID. Continue monitoring blood pressure routinely with a goal blood pressure of 140/80 or less.    3. Mixed hyperlipidemia  Monitored by Dr. Wei; continue atorvastatin 20 mg daily.      Plan:   Echocardiogram ordered to follow-up on aortic stenosis..  Patient educated on symptoms of worsening aortic stenosis.  Currently she is free of angina, CHF, dizziness or syncope.  Discontinue metoprolol tartrate 25 mg BID.  Begin carvedilol 25 mg BID for better control of blood pressure.  Salt/sodium restriction recommended.  Continue monitoring blood pressure routinely with a goal blood pressure of 140/80 or less.   Continue all other current medications.   FU in 6 MO, sooner as needed.  Thank you for allowing us to participate in the care of your patient.     Scribed for Carlton Osman MD by Lulú Cintron. 5/4/2021  13:21 EDT      I, Carlton Osman MD, personally performed the services described in this documentation as scribed by the above named individual in my presence, and it is both accurate and complete.  5/4/2021  13:27 EDT        Please note that portions of this note may have been completed with a voice recognition program. Efforts were made to edit the dictations, but occasionally words are mistranscribed.

## 2021-05-13 ENCOUNTER — HOSPITAL ENCOUNTER (OUTPATIENT)
Dept: CARDIOLOGY | Facility: HOSPITAL | Age: 81
Discharge: HOME | End: 2021-05-13
Admitting: INTERNAL MEDICINE

## 2021-05-13 DIAGNOSIS — E78.2 MIXED HYPERLIPIDEMIA: ICD-10-CM

## 2021-05-13 DIAGNOSIS — I10 ESSENTIAL HYPERTENSION: ICD-10-CM

## 2021-05-13 DIAGNOSIS — I38 VALVULAR HEART DISEASE: ICD-10-CM

## 2021-05-13 LAB
BH CV ECHO MEAS - % IVS THICK: -13.6 %
BH CV ECHO MEAS - % LVPW THICK: 16.2 %
BH CV ECHO MEAS - ACS: 1 CM
BH CV ECHO MEAS - AO MAX PG (FULL): 56.9 MMHG
BH CV ECHO MEAS - AO MAX PG: 63.8 MMHG
BH CV ECHO MEAS - AO MEAN PG (FULL): 30 MMHG
BH CV ECHO MEAS - AO MEAN PG: 34 MMHG
BH CV ECHO MEAS - AO ROOT AREA (BSA CORRECTED): 1.9
BH CV ECHO MEAS - AO ROOT AREA: 9.1 CM^2
BH CV ECHO MEAS - AO ROOT DIAM: 3.4 CM
BH CV ECHO MEAS - AO V2 MAX: 399 CM/SEC
BH CV ECHO MEAS - AO V2 MEAN: 271.5 CM/SEC
BH CV ECHO MEAS - AO V2 VTI: 89.2 CM
BH CV ECHO MEAS - AVA(I,A): 1.2 CM^2
BH CV ECHO MEAS - AVA(I,D): 1.2 CM^2
BH CV ECHO MEAS - AVA(V,A): 1 CM^2
BH CV ECHO MEAS - AVA(V,D): 1 CM^2
BH CV ECHO MEAS - BSA(HAYCOCK): 1.8 M^2
BH CV ECHO MEAS - BSA: 1.8 M^2
BH CV ECHO MEAS - BZI_BMI: 29.2 KILOGRAMS/M^2
BH CV ECHO MEAS - BZI_METRIC_HEIGHT: 160 CM
BH CV ECHO MEAS - BZI_METRIC_WEIGHT: 74.8 KG
BH CV ECHO MEAS - EDV(CUBED): 103.8 ML
BH CV ECHO MEAS - EDV(MOD-SP4): 53 ML
BH CV ECHO MEAS - EDV(TEICH): 102.4 ML
BH CV ECHO MEAS - EF(CUBED): 57.3 %
BH CV ECHO MEAS - EF(MOD-SP4): 52.8 %
BH CV ECHO MEAS - EF(TEICH): 48.9 %
BH CV ECHO MEAS - ESV(CUBED): 44.4 ML
BH CV ECHO MEAS - ESV(MOD-SP4): 25 ML
BH CV ECHO MEAS - ESV(TEICH): 52.3 ML
BH CV ECHO MEAS - FS: 24.7 %
BH CV ECHO MEAS - IVS/LVPW: 1.3
BH CV ECHO MEAS - IVSD: 1.3 CM
BH CV ECHO MEAS - IVSS: 1.1 CM
BH CV ECHO MEAS - LA DIMENSION: 3.6 CM
BH CV ECHO MEAS - LA/AO: 1
BH CV ECHO MEAS - LV DIASTOLIC VOL/BSA (35-75): 29.7 ML/M^2
BH CV ECHO MEAS - LV MASS(C)D: 200.8 GRAMS
BH CV ECHO MEAS - LV MASS(C)DI: 112.7 GRAMS/M^2
BH CV ECHO MEAS - LV MASS(C)S: 129.4 GRAMS
BH CV ECHO MEAS - LV MASS(C)SI: 72.6 GRAMS/M^2
BH CV ECHO MEAS - LV MAX PG: 6.9 MMHG
BH CV ECHO MEAS - LV MEAN PG: 4 MMHG
BH CV ECHO MEAS - LV SYSTOLIC VOL/BSA (12-30): 14 ML/M^2
BH CV ECHO MEAS - LV V1 MAX: 131 CM/SEC
BH CV ECHO MEAS - LV V1 MEAN: 92.2 CM/SEC
BH CV ECHO MEAS - LV V1 VTI: 33.7 CM
BH CV ECHO MEAS - LVIDD: 4.7 CM
BH CV ECHO MEAS - LVIDS: 3.5 CM
BH CV ECHO MEAS - LVLD AP4: 6.4 CM
BH CV ECHO MEAS - LVLS AP4: 4.6 CM
BH CV ECHO MEAS - LVOT AREA (M): 3.1 CM^2
BH CV ECHO MEAS - LVOT AREA: 3.1 CM^2
BH CV ECHO MEAS - LVOT DIAM: 2 CM
BH CV ECHO MEAS - LVPWD: 1 CM
BH CV ECHO MEAS - LVPWS: 1.2 CM
BH CV ECHO MEAS - MV A MAX VEL: 118 CM/SEC
BH CV ECHO MEAS - MV E MAX VEL: 90 CM/SEC
BH CV ECHO MEAS - MV E/A: 0.76
BH CV ECHO MEAS - PA ACC TIME: 0.11 SEC
BH CV ECHO MEAS - PA PR(ACCEL): 30 MMHG
BH CV ECHO MEAS - RAP SYSTOLE: 10 MMHG
BH CV ECHO MEAS - RVSP: 36.8 MMHG
BH CV ECHO MEAS - SI(AO): 454.2 ML/M^2
BH CV ECHO MEAS - SI(CUBED): 33.4 ML/M^2
BH CV ECHO MEAS - SI(LVOT): 59.4 ML/M^2
BH CV ECHO MEAS - SI(MOD-SP4): 15.7 ML/M^2
BH CV ECHO MEAS - SI(TEICH): 28.1 ML/M^2
BH CV ECHO MEAS - SV(AO): 809.4 ML
BH CV ECHO MEAS - SV(CUBED): 59.5 ML
BH CV ECHO MEAS - SV(LVOT): 105.9 ML
BH CV ECHO MEAS - SV(MOD-SP4): 28 ML
BH CV ECHO MEAS - SV(TEICH): 50.1 ML
BH CV ECHO MEAS - TR MAX VEL: 259 CM/SEC
MAXIMAL PREDICTED HEART RATE: 139 BPM
STRESS TARGET HR: 118 BPM

## 2021-05-13 PROCEDURE — 93306 TTE W/DOPPLER COMPLETE: CPT

## 2021-05-13 PROCEDURE — 93306 TTE W/DOPPLER COMPLETE: CPT | Performed by: INTERNAL MEDICINE

## 2021-05-25 ENCOUNTER — TELEPHONE (OUTPATIENT)
Dept: CARDIOLOGY | Facility: CLINIC | Age: 81
End: 2021-05-25

## 2021-05-25 NOTE — TELEPHONE ENCOUNTER
----- Message from Carlton Osman MD sent at 5/20/2021  7:43 AM EDT -----  Please notify the patient that echocardiogram shows some progression of aortic stenosis however it is still in the range where it can be treated with medical therapy if she has no symptoms.  She should continue same medications and see us for follow-up as already scheduled.  If she started experiencing worsening symptoms of chest pain, shortness of breath, swelling or dizziness she will need to be evaluated sooner.

## 2022-01-04 ENCOUNTER — OFFICE VISIT (OUTPATIENT)
Dept: CARDIOLOGY | Facility: CLINIC | Age: 82
End: 2022-01-04

## 2022-01-04 VITALS
DIASTOLIC BLOOD PRESSURE: 74 MMHG | WEIGHT: 156 LBS | HEIGHT: 63 IN | HEART RATE: 69 BPM | SYSTOLIC BLOOD PRESSURE: 158 MMHG | OXYGEN SATURATION: 97 % | BODY MASS INDEX: 27.64 KG/M2

## 2022-01-04 DIAGNOSIS — I38 VALVULAR HEART DISEASE: Primary | ICD-10-CM

## 2022-01-04 DIAGNOSIS — I10 ESSENTIAL HYPERTENSION: ICD-10-CM

## 2022-01-04 DIAGNOSIS — E78.2 MIXED HYPERLIPIDEMIA: ICD-10-CM

## 2022-01-04 PROCEDURE — 99214 OFFICE O/P EST MOD 30 MIN: CPT | Performed by: INTERNAL MEDICINE

## 2022-01-04 RX ORDER — LOSARTAN POTASSIUM 50 MG/1
50 TABLET ORAL DAILY
Qty: 90 TABLET | Refills: 3 | Status: SHIPPED | OUTPATIENT
Start: 2022-01-04 | End: 2022-11-23

## 2022-01-04 RX ORDER — LANOLIN ALCOHOL/MO/W.PET/CERES
1000 CREAM (GRAM) TOPICAL NIGHTLY
COMMUNITY

## 2022-01-04 NOTE — PROGRESS NOTES
[facility]    Encounter Date: 2022    Patient ID: Elizabeth Caceres is a 81 y.o. female.  : 1940     PCP: Eric Wei MD       Chief Complaint: Valvular heart disease      PROBLEM LIST:  1. Valvular heart disease:  a. Echocardiogram, 10/2013: Critical AS with SHARA 0.7 cm2 and mean gradient of 45 mmHg.   b. Abnormal Cardiolite stress test, 2013.    c. [facility], 10/23/2013, Dr. Osman: Critical AS with gradient of 101 mm across the aortic valve.  EF 70%. No significant coronary disease.    d. AVR with bioprosthetic valve, 10/2013, Dr. Sebastian.   e. Normal MPS with no evidence of ischemia. EF 80%.  f. Echocardiogram, 10/03/2017: EF 66-70%. LV diastolic dysfunction (grade I) consistent with impaired relaxation. Bioprosthetic AV. Calcification of the aortic valve mainly affecting the non and right coronary cusp(s). Mild to moderate AS. Peak gradient of 35 and a mean gradient of 20 mm Hg. SHARA 1.0cm2, probably an underestimation. Myxomatous changes of the mitral valve apparatus. Mild MR. No evidence of pericardial effusion.  g. Echocardiogram, 10/11/2018: EF > 70% with mild concentric LVH. LV diastolic dysfunction (grade I). Mild AS, bioprosthetic AV with peak gradient 30 mmHg, mean 17. No AI. Mild TR, RVSP 37 mmHg. No evidence of pericardial effusion.  h. Echocardiogram, 2021: EF 61-65%. Moderate LVH. Grade I diastolic dysfunction. Bioprosthetic AV present. Moderate to severe AS, Ao max PG 68 mmHg, Ao mean PG 36 mmHg, SHARA (I,D) 1.16 cm^2. RVSP from TR 35-45 mmHg. Mild PHTN.   2. Hypertension.   3. Dyslipidemia.    4. History of presyncope.   5. History of MRSA with I&D of lower extremity, .   6. Vitamin D and Vitamin B12 deficiency.     History of Present Illness  Patient presents today for a 6 month follow-up with a history of valvular heart disease and cardiac risk factors. Since last visit, the patient has been doing well overall from a cardiovascular  standpoint. She monitors her blood pressure at home and reports it being controlled as far as she knows. A few years ago she had an episode of vertigo but has not experienced dizziness or light-headedness that could be related to worsening aortic stenosis. She does not exercise but actively walks around in her house. Patient denies chest pain, shortness of breath, palpitations, edema, and syncope.     No Known Allergies      Current Outpatient Medications:   •  aspirin 81 MG EC tablet, Take 81 mg by mouth Daily., Disp: , Rfl:   •  atorvastatin (LIPITOR) 20 MG tablet, Take 20 mg by mouth Every Night., Disp: , Rfl:   •  carvedilol (COREG) 25 MG tablet, Take 1 tablet by mouth 2 (Two) Times a Day., Disp: 180 tablet, Rfl: 3  •  cholecalciferol (VITAMIN D3) 1000 units tablet, Take 1,000 Units by mouth Daily., Disp: , Rfl:   •  vitamin B-12 (CYANOCOBALAMIN) 1000 MCG tablet, Take 1,000 mcg by mouth Daily., Disp: , Rfl:   •  vitamin C (ASCORBIC ACID) 500 MG tablet, Take 500 mg by mouth Daily., Disp: , Rfl:   •  losartan (COZAAR) 50 MG tablet, Take 1 tablet by mouth Daily., Disp: 90 tablet, Rfl: 3    The following portions of the patient's history were reviewed and updated as appropriate: allergies, current medications, past family history, past medical history, past social history, past surgical history and problem list.    ROS  Review of Systems   Constitution: Negative for chills, fever, fatigue, generalized weakness.   Cardiovascular: Negative for chest pain, dyspnea on exertion, leg swelling, palpitations, orthopnea, and syncope.   Respiratory: Negative for cough, shortness of breath, and wheezing.  HENT: Negative for ear pain, nosebleeds, and tinnitus.  Gastrointestinal: Negative for abdominal pain, constipation, diarrhea, nausea and vomiting.   Genitourinary: No urinary symptoms.  Musculoskeletal: Negative for muscle cramps.  Neurological: Negative for dizziness, headaches, loss of balance, numbness, and symptoms of  "stroke.  Psychiatric: Normal mental status.     All other systems reviewed and are negative.        Objective:     /74 (BP Location: Left arm, Patient Position: Sitting)   Pulse 69   Ht 160 cm (63\")   Wt 70.8 kg (156 lb)   LMP  (LMP Unknown)   SpO2 97%   BMI 27.63 kg/m²    BP repeat: 158/78    Physical Exam  Constitutional: Patient appears well-developed and well-nourished.   HENT: HEENT exam unremarkable.   Neck: Neck supple. No JVD present. No carotid bruits.   Cardiovascular: Normal rate, regular rhythm and normal heart sounds. 3/6 systolic murmur heard.   2+ symmetric pulses.   Pulmonary/Chest: Breath sounds normal. Does not exhibit tenderness.   Abdominal: Abdomen benign.   Musculoskeletal: Does not exhibit edema.   Neurological: Neurological exam unremarkable.   Vitals reviewed.    Data Review:      Procedures       Assessment:      Diagnosis Plan   1. Valvular heart disease  Echo from 5/2021 showed worsening AS with max PG 68 mmHg and mean PG 36 mmHg but pt currently asymptomatic; repeat echo prior to next visit and discussed need for TAVR replacement when mean PG is >40 mmHg or if she begins to have new onset of symptoms.    2. Essential hypertension  Inadequate control; begin losartan 50 mg daily and continue carvedilol 25 mg    3. Mixed hyperlipidemia  Monitored by PCP; continue atorvastatin 20 mg      Plan:   If patient begins to experience persistent dizziness, light-headedness, shortness of breath, smothering, and/or edema she is to contact our office as these are indications of severe aortic stenosis and need for further intervention.     Repeat echocardiogram in May 2022 prior to follow-up, if it is unchanged and she's asymptomatic we will continue to monitor.   Begin losartan 50 mg daily for improved control of blood pressure.   Continue current medications.   FU in 6 MO, sooner as needed.  Thank you for allowing us to participate in the care of your patient.     Scribed for Carlton Osman, " MD by Ree Bourgeois. 1/4/2022 10:51 EST      I, Carlton Osman MD, personally performed the services described in this documentation as scribed by the above named individual in my presence, and it is both accurate and complete.  1/7/2022  15:14 EST        Part of this note may be an electronic transcription/translation of spoken language to printed text using the Dragon Dictation System.

## 2022-04-15 ENCOUNTER — APPOINTMENT (OUTPATIENT)
Dept: GENERAL RADIOLOGY | Facility: HOSPITAL | Age: 82
End: 2022-04-15

## 2022-04-15 PROCEDURE — 93005 ELECTROCARDIOGRAM TRACING: CPT | Performed by: STUDENT IN AN ORGANIZED HEALTH CARE EDUCATION/TRAINING PROGRAM

## 2022-04-15 PROCEDURE — 36415 COLL VENOUS BLD VENIPUNCTURE: CPT

## 2022-04-15 PROCEDURE — 99283 EMERGENCY DEPT VISIT LOW MDM: CPT

## 2022-04-15 PROCEDURE — 93010 ELECTROCARDIOGRAM REPORT: CPT | Performed by: SPECIALIST

## 2022-04-15 PROCEDURE — 71045 X-RAY EXAM CHEST 1 VIEW: CPT

## 2022-04-15 RX ORDER — CARVEDILOL 25 MG/1
TABLET ORAL
Qty: 180 TABLET | Refills: 3 | Status: ON HOLD | OUTPATIENT
Start: 2022-04-15 | End: 2022-05-19

## 2022-04-16 ENCOUNTER — HOSPITAL ENCOUNTER (EMERGENCY)
Facility: HOSPITAL | Age: 82
Discharge: HOME | End: 2022-04-16
Attending: STUDENT IN AN ORGANIZED HEALTH CARE EDUCATION/TRAINING PROGRAM | Admitting: STUDENT IN AN ORGANIZED HEALTH CARE EDUCATION/TRAINING PROGRAM

## 2022-04-16 VITALS
DIASTOLIC BLOOD PRESSURE: 74 MMHG | BODY MASS INDEX: 27.64 KG/M2 | HEART RATE: 83 BPM | SYSTOLIC BLOOD PRESSURE: 152 MMHG | HEIGHT: 63 IN | OXYGEN SATURATION: 96 % | TEMPERATURE: 97.7 F | RESPIRATION RATE: 18 BRPM | WEIGHT: 156 LBS

## 2022-04-16 DIAGNOSIS — R06.02 SHORTNESS OF BREATH: Primary | ICD-10-CM

## 2022-04-16 LAB
ALBUMIN SERPL-MCNC: 4.33 G/DL (ref 3.5–5.2)
ALBUMIN/GLOB SERPL: 1.5 G/DL
ALP SERPL-CCNC: 101 U/L (ref 39–117)
ALT SERPL W P-5'-P-CCNC: 21 U/L (ref 1–33)
ANION GAP SERPL CALCULATED.3IONS-SCNC: 9.9 MMOL/L (ref 5–15)
AST SERPL-CCNC: 18 U/L (ref 1–32)
BASOPHILS # BLD AUTO: 0.04 10*3/MM3 (ref 0–0.2)
BASOPHILS NFR BLD AUTO: 0.7 % (ref 0–1.5)
BILIRUB SERPL-MCNC: 0.5 MG/DL (ref 0–1.2)
BILIRUB UR QL STRIP: NEGATIVE
BUN SERPL-MCNC: 24 MG/DL (ref 8–23)
BUN/CREAT SERPL: 32.9 (ref 7–25)
CALCIUM SPEC-SCNC: 9.4 MG/DL (ref 8.6–10.5)
CHLORIDE SERPL-SCNC: 108 MMOL/L (ref 98–107)
CLARITY UR: CLEAR
CO2 SERPL-SCNC: 24.1 MMOL/L (ref 22–29)
COLOR UR: YELLOW
CREAT SERPL-MCNC: 0.73 MG/DL (ref 0.57–1)
CRP SERPL-MCNC: <0.3 MG/DL (ref 0–0.5)
D DIMER PPP FEU-MCNC: 0.78 MCGFEU/ML (ref 0–0.5)
D-LACTATE SERPL-SCNC: 0.9 MMOL/L (ref 0.5–2)
DEPRECATED RDW RBC AUTO: 47.3 FL (ref 37–54)
EGFRCR SERPLBLD CKD-EPI 2021: 82.2 ML/MIN/1.73
EOSINOPHIL # BLD AUTO: 0.32 10*3/MM3 (ref 0–0.4)
EOSINOPHIL NFR BLD AUTO: 5.7 % (ref 0.3–6.2)
ERYTHROCYTE [DISTWIDTH] IN BLOOD BY AUTOMATED COUNT: 13.2 % (ref 12.3–15.4)
GLOBULIN UR ELPH-MCNC: 3 GM/DL
GLUCOSE SERPL-MCNC: 128 MG/DL (ref 65–99)
GLUCOSE UR STRIP-MCNC: NEGATIVE MG/DL
HCT VFR BLD AUTO: 31.2 % (ref 34–46.6)
HGB BLD-MCNC: 9.5 G/DL (ref 12–15.9)
HGB UR QL STRIP.AUTO: NEGATIVE
HOLD SPECIMEN: NORMAL
HOLD SPECIMEN: NORMAL
IMM GRANULOCYTES # BLD AUTO: 0.01 10*3/MM3 (ref 0–0.05)
IMM GRANULOCYTES NFR BLD AUTO: 0.2 % (ref 0–0.5)
INR PPP: 0.98 (ref 0.9–1.1)
KETONES UR QL STRIP: ABNORMAL
LEUKOCYTE ESTERASE UR QL STRIP.AUTO: NEGATIVE
LIPASE SERPL-CCNC: 41 U/L (ref 13–60)
LYMPHOCYTES # BLD AUTO: 1.26 10*3/MM3 (ref 0.7–3.1)
LYMPHOCYTES NFR BLD AUTO: 22.4 % (ref 19.6–45.3)
MAGNESIUM SERPL-MCNC: 2.2 MG/DL (ref 1.6–2.4)
MCH RBC QN AUTO: 29.8 PG (ref 26.6–33)
MCHC RBC AUTO-ENTMCNC: 30.4 G/DL (ref 31.5–35.7)
MCV RBC AUTO: 97.8 FL (ref 79–97)
MONOCYTES # BLD AUTO: 0.47 10*3/MM3 (ref 0.1–0.9)
MONOCYTES NFR BLD AUTO: 8.4 % (ref 5–12)
NEUTROPHILS NFR BLD AUTO: 3.52 10*3/MM3 (ref 1.7–7)
NEUTROPHILS NFR BLD AUTO: 62.6 % (ref 42.7–76)
NITRITE UR QL STRIP: NEGATIVE
NRBC BLD AUTO-RTO: 0 /100 WBC (ref 0–0.2)
NT-PROBNP SERPL-MCNC: 1428 PG/ML (ref 0–1800)
PH UR STRIP.AUTO: 5.5 [PH] (ref 5–8)
PLATELET # BLD AUTO: 198 10*3/MM3 (ref 140–450)
PMV BLD AUTO: 9.2 FL (ref 6–12)
POTASSIUM SERPL-SCNC: 4 MMOL/L (ref 3.5–5.2)
PROT SERPL-MCNC: 7.3 G/DL (ref 6–8.5)
PROT UR QL STRIP: ABNORMAL
PROTHROMBIN TIME: 13.2 SECONDS (ref 12.1–14.7)
QT INTERVAL: 380 MS
QTC INTERVAL: 449 MS
RBC # BLD AUTO: 3.19 10*6/MM3 (ref 3.77–5.28)
SODIUM SERPL-SCNC: 142 MMOL/L (ref 136–145)
SP GR UR STRIP: 1.02 (ref 1–1.03)
TROPONIN T SERPL-MCNC: <0.01 NG/ML (ref 0–0.03)
TROPONIN T SERPL-MCNC: <0.01 NG/ML (ref 0–0.03)
UROBILINOGEN UR QL STRIP: ABNORMAL
WBC NRBC COR # BLD: 5.62 10*3/MM3 (ref 3.4–10.8)
WHOLE BLOOD HOLD SPECIMEN: NORMAL
WHOLE BLOOD HOLD SPECIMEN: NORMAL

## 2022-04-16 PROCEDURE — 83690 ASSAY OF LIPASE: CPT | Performed by: NURSE PRACTITIONER

## 2022-04-16 PROCEDURE — 83880 ASSAY OF NATRIURETIC PEPTIDE: CPT | Performed by: NURSE PRACTITIONER

## 2022-04-16 PROCEDURE — 86140 C-REACTIVE PROTEIN: CPT | Performed by: NURSE PRACTITIONER

## 2022-04-16 PROCEDURE — 36415 COLL VENOUS BLD VENIPUNCTURE: CPT

## 2022-04-16 PROCEDURE — 84484 ASSAY OF TROPONIN QUANT: CPT | Performed by: NURSE PRACTITIONER

## 2022-04-16 PROCEDURE — 85610 PROTHROMBIN TIME: CPT | Performed by: NURSE PRACTITIONER

## 2022-04-16 PROCEDURE — 83605 ASSAY OF LACTIC ACID: CPT | Performed by: NURSE PRACTITIONER

## 2022-04-16 PROCEDURE — 81003 URINALYSIS AUTO W/O SCOPE: CPT | Performed by: NURSE PRACTITIONER

## 2022-04-16 PROCEDURE — 80053 COMPREHEN METABOLIC PANEL: CPT | Performed by: NURSE PRACTITIONER

## 2022-04-16 PROCEDURE — 83735 ASSAY OF MAGNESIUM: CPT | Performed by: NURSE PRACTITIONER

## 2022-04-16 PROCEDURE — 85379 FIBRIN DEGRADATION QUANT: CPT | Performed by: STUDENT IN AN ORGANIZED HEALTH CARE EDUCATION/TRAINING PROGRAM

## 2022-04-16 PROCEDURE — 85025 COMPLETE CBC W/AUTO DIFF WBC: CPT | Performed by: NURSE PRACTITIONER

## 2022-04-16 RX ORDER — FUROSEMIDE 20 MG/1
20 TABLET ORAL DAILY
Qty: 4 TABLET | Refills: 0 | Status: SHIPPED | OUTPATIENT
Start: 2022-04-16 | End: 2022-07-05

## 2022-04-16 NOTE — ED PROVIDER NOTES
[facility]  emergency department encounter        Pt Name: Elizabeth Caceres  MRN: [id number]  Birthdate 1940  Date of evaluation: 4/16/2022    Chief Complaint   Patient presents with   • Shortness of Breath             HISTORY OF PRESENT ILLNESS:   Elizabeth Caceres is a 82 y.o. female PMH HTN, HLD, COPD, arthritis, aortic valve replacement.    Patient presents with abdominal complaint of shortness of breath for 1 month.  Patient reports she was watching a commercial noting symptoms of atrial fibrillation and was concerned that she had atrial fibrillation since her symptoms were consistent with her descriptions on the commercial.  Patient reports that her shortness of breath worsened prior to this commercial.  She endorses some concomitant chest tightness.  No pleuritic worsening.  No leg swelling or pain.    No other exacerbating or alleviating factors other than as noted above.  Severity: Severe    PCP: Eric Wei MD          REVIEW OF SYSTEMS:     Review of Systems   Constitutional: Negative for fever.   HENT: Negative for congestion and rhinorrhea.    Eyes: Negative for visual disturbance.   Respiratory: Positive for shortness of breath. Negative for cough.    Cardiovascular: Negative for chest pain.        Chest tightness   Gastrointestinal: Negative for abdominal pain, nausea and vomiting.   Genitourinary: Negative for dysuria.   Musculoskeletal: Negative for myalgias.   Skin: Negative for rash.   Neurological: Negative for headaches.   Psychiatric/Behavioral: Negative for confusion.       Review of systems otherwise as per HPI.          PREVIOUS HISTORY:         Past Medical History:   Diagnosis Date   • Arthritis    • COPD (chronic obstructive pulmonary disease) (HCC)    • Hyperlipidemia    • Hypertension    • MRSA (methicillin resistant Staphylococcus aureus) 2007    History of MRSA with I&D of lower extremity   • Urinary frequency            Past Surgical History:   Procedure  Laterality Date   • AORTIC VALVE REPAIR/REPLACEMENT  10/2013    Dr. Sebastian   • APPENDECTOMY N/A 2019    Procedure: APPENDECTOMY LAPAROSCOPIC;  Surgeon: Peter Vargas MD;  Location: [facility];  Service: General   • CARDIAC SURGERY     • COLONOSCOPY     • HIP ARTHROPLASTY Left    • TOTAL LAPAROSCOPIC HYSTERECTOMY SALPINGO OOPHORECTOMY Right 2019    Procedure: LAPAROSCOPIC SALPINGOOPHORECTOMY;  Surgeon: Peter Vargas MD;  Location: [facility];  Service: General           Social History     Socioeconomic History   • Marital status:    Tobacco Use   • Smoking status: Former Smoker     Quit date:      Years since quittin.3   • Smokeless tobacco: Never Used   Substance and Sexual Activity   • Alcohol use: No   • Drug use: No   • Sexual activity: Defer         No family history on file.      Current Outpatient Medications   Medication Instructions   • aspirin 81 mg, Oral, Daily   • atorvastatin (LIPITOR) 20 mg, Oral, Nightly   • carvedilol (COREG) 25 MG tablet TAKE 1 TABLET TWICE DAILY   • cholecalciferol (VITAMIN D3) 1,000 Units, Oral, Daily   • furosemide (LASIX) 20 mg, Oral, Daily   • losartan (COZAAR) 50 mg, Oral, Daily   • vitamin B-12 (CYANOCOBALAMIN) 1,000 mcg, Oral, Daily   • vitamin C (ASCORBIC ACID) 500 mg, Oral, Daily         Allergies:  Patient has no known allergies.    Medications, allergies and past medical, surgical, family, and social history reviewed.        PHYSICAL EXAM:     Physical Exam  Vitals and nursing note reviewed.   Constitutional:       General: She is not in acute distress.  HENT:      Head: Normocephalic and atraumatic.   Eyes:      Extraocular Movements: Extraocular movements intact.      Conjunctiva/sclera: Conjunctivae normal.   Cardiovascular:      Rate and Rhythm: Normal rate and regular rhythm.      Heart sounds: Murmur heard.   Pulmonary:      Effort: Pulmonary effort is normal. No respiratory distress.      Breath sounds: No stridor. No wheezing, rhonchi  or rales.   Abdominal:      General: Abdomen is flat. There is no distension.   Musculoskeletal:         General: No deformity. Normal range of motion.      Cervical back: Normal range of motion. No rigidity.   Neurological:      General: No focal deficit present.      Mental Status: She is alert and oriented to person, place, and time.   Psychiatric:         Mood and Affect: Mood normal.         Behavior: Behavior normal.             COMPLETED DIAGNOSTIC STUDIES AND INTERVENTIONS:     Lab Results (last 24 hours)     Procedure Component Value Units Date/Time    CBC & Differential [[id number]]  (Abnormal) Collected: 04/16/22 0110    Specimen: Blood Updated: 04/16/22 0116    Narrative:      The following orders were created for panel order CBC & Differential.  Procedure                               Abnormality         Status                     ---------                               -----------         ------                     CBC Auto Differential[[id number]]        Abnormal            Final result                 Please view results for these tests on the individual orders.    Comprehensive Metabolic Panel [[id number]]  (Abnormal) Collected: 04/16/22 0110    Specimen: Blood Updated: 04/16/22 0137     Glucose 128 mg/dL      BUN 24 mg/dL      Creatinine 0.73 mg/dL      Sodium 142 mmol/L      Potassium 4.0 mmol/L      Chloride 108 mmol/L      CO2 24.1 mmol/L      Calcium 9.4 mg/dL      Total Protein 7.3 g/dL      Albumin 4.33 g/dL      ALT (SGPT) 21 U/L      AST (SGOT) 18 U/L      Alkaline Phosphatase 101 U/L      Total Bilirubin 0.5 mg/dL      Globulin 3.0 gm/dL      A/G Ratio 1.5 g/dL      BUN/Creatinine Ratio 32.9     Anion Gap 9.9 mmol/L      eGFR 82.2 mL/min/1.73      Comment: National Kidney Foundation and American Society of Nephrology (ASN) Task Force recommended calculation based on the Chronic Kidney Disease Epidemiology Collaboration (CKD-EPI) equation refit without adjustment for race.       Narrative:       GFR Normal >60  Chronic Kidney Disease <60  Kidney Failure <15      Protime-INR [[id number]]  (Normal) Collected: 04/16/22 0110    Specimen: Blood Updated: 04/16/22 0124     Protime 13.2 Seconds      Comment: Note new Reference Range        INR 0.98    Narrative:      Suggested INR therapeutic range for stable oral anticoagulant therapy:    Low Intensity therapy:   1.5-2.0  Moderate Intensity therapy:   2.0-3.0  High Intensity therapy:   2.5-4.0    Lipase [[id number]]  (Normal) Collected: 04/16/22 0110    Specimen: Blood Updated: 04/16/22 0137     Lipase 41 U/L     BNP [[id number]]  (Normal) Collected: 04/16/22 0110    Specimen: Blood Updated: 04/16/22 0135     proBNP 1,428.0 pg/mL     Narrative:      Among patients with dyspnea, NT-proBNP is highly sensitive for the detection of acute congestive heart failure. In addition NT-proBNP of <300 pg/ml effectively rules out acute congestive heart failure with 99% negative predictive value.    Results may be falsely decreased if patient taking Biotin.      Troponin [[id number]]  (Normal) Collected: 04/16/22 0110    Specimen: Blood Updated: 04/16/22 0137     Troponin T <0.010 ng/mL     Narrative:      Troponin T Reference Range:  <= 0.03 ng/mL-   Negative for AMI  >0.03 ng/mL-     Abnormal for myocardial necrosis.  Clinicians would have to utilize clinical acumen, EKG, Troponin and serial changes to determine if it is an Acute Myocardial Infarction or myocardial injury due to an underlying chronic condition.       Results may be falsely decreased if patient taking Biotin.      Lactic Acid, Plasma [[id number]]  (Normal) Collected: 04/16/22 0110    Specimen: Blood Updated: 04/16/22 0136     Lactate 0.9 mmol/L     C-reactive Protein [[id number]]  (Normal) Collected: 04/16/22 0110    Specimen: Blood Updated: 04/16/22 0137     C-Reactive Protein <0.30 mg/dL     Magnesium [[id number]]  (Normal) Collected: 04/16/22 0110    Specimen: Blood Updated: 04/16/22 0137     Magnesium 2.2 mg/dL      CBC Auto Differential [[id number]]  (Abnormal) Collected: 04/16/22 0110    Specimen: Blood Updated: 04/16/22 0116     WBC 5.62 10*3/mm3      RBC 3.19 10*6/mm3      Hemoglobin 9.5 g/dL      Hematocrit 31.2 %      MCV 97.8 fL      MCH 29.8 pg      MCHC 30.4 g/dL      RDW 13.2 %      RDW-SD 47.3 fl      MPV 9.2 fL      Platelets 198 10*3/mm3      Neutrophil % 62.6 %      Lymphocyte % 22.4 %      Monocyte % 8.4 %      Eosinophil % 5.7 %      Basophil % 0.7 %      Immature Grans % 0.2 %      Neutrophils, Absolute 3.52 10*3/mm3      Lymphocytes, Absolute 1.26 10*3/mm3      Monocytes, Absolute 0.47 10*3/mm3      Eosinophils, Absolute 0.32 10*3/mm3      Basophils, Absolute 0.04 10*3/mm3      Immature Grans, Absolute 0.01 10*3/mm3      nRBC 0.0 /100 WBC     D-dimer, Quantitative [[id number]]  (Abnormal) Collected: 04/16/22 0110    Specimen: Blood Updated: 04/16/22 0248     D-Dimer, Quantitative 0.78 MCGFEU/mL     Narrative:      d-Dimer assay result is to be used in conjunction with a non-high clinical pretest probability (PTP) assessment tool to exclude PE and aid in diagnosis of VTE with cutoff of 0.5 MCGFEU/mL.    Troponin [[id number]]  (Normal) Collected: 04/16/22 0259    Specimen: Blood Updated: 04/16/22 0321     Troponin T <0.010 ng/mL     Narrative:      Troponin T Reference Range:  <= 0.03 ng/mL-   Negative for AMI  >0.03 ng/mL-     Abnormal for myocardial necrosis.  Clinicians would have to utilize clinical acumen, EKG, Troponin and serial changes to determine if it is an Acute Myocardial Infarction or myocardial injury due to an underlying chronic condition.       Results may be falsely decreased if patient taking Biotin.      Urinalysis With Microscopic If Indicated (No Culture) - Urine, Clean Catch [[id number]]  (Abnormal) Collected: 04/16/22 0346    Specimen: Urine, Clean Catch Updated: 04/16/22 0353     Color, UA Yellow     Appearance, UA Clear     pH, UA 5.5     Specific Gravity, UA 1.022     Glucose, UA  Negative     Ketones, UA Trace     Bilirubin, UA Negative     Blood, UA Negative     Protein, UA Trace     Leuk Esterase, UA Negative     Nitrite, UA Negative     Urobilinogen, UA 1.0 E.U./dL    Narrative:      Urine microscopic not indicated.            XR Chest 1 View   Final Result   Postoperative heart is enlarged and increased from prior with interval development of vascular congestion and interstitial edema consistent with mild to moderate congestive heart failure. Follow-up to clearing recommended.      Signer Name: Janiya Saunders MD    Signed: 4/15/2022 11:49 PM    Workstation Name: [facility]     [facility]            New Medications Ordered This Visit   Medications   • furosemide (LASIX) 20 MG tablet     Sig: Take 1 tablet by mouth Daily for 4 days.     Dispense:  4 tablet     Refill:  0         Procedures            MEDICAL DECISION-MAKING AND ED COURSE:     ED Course as of 04/16/22 0409   Fri Apr 15, 2022   2353 EKG noted sinus rhythm.  84 bpm.  .  QRS 80.  QTc 449.  No acute ST elevation [SF]   Sat Apr 16, 2022 0229 MDM: 82-year-old female presents for shortness of breath for 1 month, worsened this evening.  Patient endorses chest tightness otherwise broadly denies ROS. [KP]   0229 proBNP: 1,428.0 [KP]   0229 Lactate: 0.9 [KP]   0229 Lipase: 41 [KP]   0229 C-Reactive Protein: <0.30 [KP]   0229 Magnesium: 2.2 [KP]   0229 Troponin T: <0.010 [KP]   0229 Creatinine: 0.73 [KP]   0229 WBC: 5.62 [KP]   0229 Hemoglobin(!): 9.5 [KP]   0229 Last hemoglobin was 12.5, 2 years ago [KP]   0230 XR Chest 1 View  IMPRESSION:  Postoperative heart is enlarged and increased from prior with interval development of vascular congestion and interstitial edema consistent with mild to moderate congestive heart failure. Follow-up to clearing recommended. [KP]   0230 Patient has outpatient echo scheduled to occur soon and cardiology follow-up in the beginning of May.  She has a porcine valve in place  of her AV done 10 years ago and she is due for replacement.  Cardiologist is in [place]. [KP]   0254 D-Dimer, Quant(!!): 0.78 [KP]   0301 Age-adjusted D-dimer negative. [KP]   0402 Troponin T: <0.010 [KP]   0407 CXR consistent with congestive heart failure.  No elevation in BMP.  Denies lower extremity swelling.  Will plan to start patient on trial of furosemide for 4 days and to monitor for improvement in symptoms.  Patient to follow-up with her cardiologist at next available appointment and discuss possibly rescheduling echo for an earlier date.  Recommended return here for any new onset or worsening symptoms.  Patient agreeable to plan. [KP]      ED Course User Index  [KP] Ted Lyons MD  [SF] Nico Cortes, DO       ?      FINAL IMPRESSION:       1. Shortness of breath         The complaints listed here are new problems to this examiner.      FOLLOW-UP  No follow-up provider specified.    DISPOSITION  ED Disposition     ED Disposition   Discharge    Condition   Stable    Comment   --                   This care is provided during an unprecedented national emergency due to the Novel Coronavirus (COVID-19). COVID-19 infections and transmission risks place heavy strains on healthcare resources. As this pandemic evolves, the Hospital and providers strive to respond fluidly, to remain operational, and to provide care relative to available resources and information. Outcomes are unpredictable and treatments are without well-defined guidelines. Further, the impact of COVID-19 on all aspects of emergency care, including the impact to patients seeking care for reasons other than COVID-19, is unavoidable during this national emergency.    This note was dictated using a [id number] tool. Occasional wrong-word or 'sound-a-like' substitutions may have occurred due to the inherent limitations of voice recognition software. ?Read the chart carefully and recognize, using context, where substitutions have  occurred.    Ted Lyons MD  04:09 EDT  4/16/2022             eTd Lyons MD  04/16/22 0409

## 2022-04-16 NOTE — DISCHARGE INSTRUCTIONS
Specific cause as to your worsening shortness of breath is not identified however it is likely that this is related to your heart and possibly her heart valve.  Recommend you discuss with your cardiologist team to schedule follow-up as soon as possible and also recommend trying to reschedule your heart ultrasound from earlier date as well.  Do not hesitate to return here for new onset or worsening symptoms.

## 2022-04-16 NOTE — ED NOTES
MEDICAL SCREENING:    Reason for Visit: SOA, palpitations. Hx of atrial fibrillation     Patient initially seen in triage.  The patient was advised further evaluation and diagnostic testing will be needed, some of the treatment and testing will be initiated in the lobby in order to begin the process.  The patient will be returned to the waiting area for the time being and possibly be re-assessed by a subsequent ED provider.  The patient will be brought back to the treatment area in as timely manner as possible.       Josefina Vargas, APRN  04/15/22 5820

## 2022-04-22 ENCOUNTER — HOSPITAL ENCOUNTER (OUTPATIENT)
Dept: CARDIOLOGY | Facility: HOSPITAL | Age: 82
Discharge: HOME | End: 2022-04-22
Admitting: INTERNAL MEDICINE

## 2022-04-22 DIAGNOSIS — I10 ESSENTIAL HYPERTENSION: ICD-10-CM

## 2022-04-22 DIAGNOSIS — E78.2 MIXED HYPERLIPIDEMIA: ICD-10-CM

## 2022-04-22 DIAGNOSIS — I38 VALVULAR HEART DISEASE: ICD-10-CM

## 2022-04-22 LAB
BH CV ECHO MEAS - AO MAX PG: 124.1 MMHG
BH CV ECHO MEAS - AO MEAN PG: 65.3 MMHG
BH CV ECHO MEAS - AO ROOT DIAM: 3 CM
BH CV ECHO MEAS - AO V2 MAX: 556.7 CM/SEC
BH CV ECHO MEAS - AO V2 VTI: 139.3 CM
BH CV ECHO MEAS - AVA(I,D): 0.51 CM2
BH CV ECHO MEAS - EDV(CUBED): 73.3 ML
BH CV ECHO MEAS - EDV(MOD-SP4): 63.3 ML
BH CV ECHO MEAS - EF(MOD-SP4): 55.6 %
BH CV ECHO MEAS - ESV(CUBED): 18.9 ML
BH CV ECHO MEAS - ESV(MOD-SP4): 28.1 ML
BH CV ECHO MEAS - FS: 36.3 %
BH CV ECHO MEAS - IVS/LVPW: 1.17 CM
BH CV ECHO MEAS - IVSD: 1.42 CM
BH CV ECHO MEAS - LA DIMENSION: 3.2 CM
BH CV ECHO MEAS - LAT PEAK E' VEL: 5.4 CM/SEC
BH CV ECHO MEAS - LV DIASTOLIC VOL/BSA (35-75): 36.4 CM2
BH CV ECHO MEAS - LV MASS(C)D: 203 GRAMS
BH CV ECHO MEAS - LV MAX PG: 2.7 MMHG
BH CV ECHO MEAS - LV MEAN PG: 2 MMHG
BH CV ECHO MEAS - LV SYSTOLIC VOL/BSA (12-30): 16.1 CM2
BH CV ECHO MEAS - LV V1 MAX: 81.6 CM/SEC
BH CV ECHO MEAS - LV V1 VTI: 22.4 CM
BH CV ECHO MEAS - LVIDD: 4.2 CM
BH CV ECHO MEAS - LVIDS: 2.7 CM
BH CV ECHO MEAS - LVOT AREA: 3.1 CM2
BH CV ECHO MEAS - LVOT DIAM: 2 CM
BH CV ECHO MEAS - LVPWD: 1.21 CM
BH CV ECHO MEAS - MED PEAK E' VEL: 6 CM/SEC
BH CV ECHO MEAS - MV A MAX VEL: 110 CM/SEC
BH CV ECHO MEAS - MV E MAX VEL: 124 CM/SEC
BH CV ECHO MEAS - MV E/A: 1.13
BH CV ECHO MEAS - PA ACC TIME: 0.12 SEC
BH CV ECHO MEAS - PA PR(ACCEL): 26.8 MMHG
BH CV ECHO MEAS - RAP SYSTOLE: 10 MMHG
BH CV ECHO MEAS - RVSP: 54.6 MMHG
BH CV ECHO MEAS - SI(MOD-SP4): 20.2 ML/M2
BH CV ECHO MEAS - SV(LVOT): 70.4 ML
BH CV ECHO MEAS - SV(MOD-SP4): 35.2 ML
BH CV ECHO MEAS - TAPSE (>1.6): 2.03 CM
BH CV ECHO MEAS - TR MAX PG: 44.6 MMHG
BH CV ECHO MEAS - TR MAX VEL: 334 CM/SEC
BH CV ECHO MEASUREMENTS AVERAGE E/E' RATIO: 21.75
LEFT ATRIUM VOLUME INDEX: 21.2 ML/M2
MAXIMAL PREDICTED HEART RATE: 138 BPM
STRESS TARGET HR: 117 BPM

## 2022-04-22 PROCEDURE — 93306 TTE W/DOPPLER COMPLETE: CPT

## 2022-04-22 PROCEDURE — 93306 TTE W/DOPPLER COMPLETE: CPT | Performed by: INTERNAL MEDICINE

## 2022-04-27 ENCOUNTER — TELEPHONE (OUTPATIENT)
Dept: CARDIOLOGY | Facility: CLINIC | Age: 82
End: 2022-04-27

## 2022-04-27 DIAGNOSIS — I35.0 AORTIC STENOSIS, SEVERE: ICD-10-CM

## 2022-04-27 DIAGNOSIS — I38 VALVULAR HEART DISEASE: Primary | ICD-10-CM

## 2022-04-27 DIAGNOSIS — I10 PRIMARY HYPERTENSION: ICD-10-CM

## 2022-04-27 DIAGNOSIS — E78.2 MIXED HYPERLIPIDEMIA: ICD-10-CM

## 2022-04-27 NOTE — TELEPHONE ENCOUNTER
"Per AA: \"Please let her know that the echocardiogram now shows significant progression of aortic stenosis and that combined with symptoms indicates need for aortic valve replacement.  For further evaluation I recommend proceeding to cardiac cath and AXEL which you can schedule at the earliest convenience subsequently we will involve our heart valve team and determine the timing of valve replacement procedure.  If they would rather come and see me in the office to discuss this further you can get an appointment but my recommendation will be the same\"    Patient and daughter notified. Orders placed.       "

## 2022-04-29 PROBLEM — I35.0 AORTIC STENOSIS, SEVERE: Status: ACTIVE | Noted: 2022-04-29

## 2022-05-05 ENCOUNTER — APPOINTMENT (OUTPATIENT)
Dept: CARDIOLOGY | Facility: HOSPITAL | Age: 82
End: 2022-05-05

## 2022-05-07 ENCOUNTER — PREP FOR SURGERY (OUTPATIENT)
Dept: OTHER | Facility: HOSPITAL | Age: 82
End: 2022-05-07

## 2022-05-07 DIAGNOSIS — Z01.812 PRE-PROCEDURE LAB EXAM: Primary | ICD-10-CM

## 2022-05-07 RX ORDER — SODIUM CHLORIDE 0.9 % (FLUSH) 0.9 %
10 SYRINGE (ML) INJECTION EVERY 12 HOURS SCHEDULED
Status: CANCELLED | OUTPATIENT
Start: 2022-05-07

## 2022-05-07 RX ORDER — ASPIRIN 81 MG/1
324 TABLET, CHEWABLE ORAL ONCE
Status: CANCELLED | OUTPATIENT
Start: 2022-05-07 | End: 2022-05-07

## 2022-05-07 RX ORDER — SODIUM CHLORIDE 0.9 % (FLUSH) 0.9 %
10 SYRINGE (ML) INJECTION AS NEEDED
Status: CANCELLED | OUTPATIENT
Start: 2022-05-07

## 2022-05-07 RX ORDER — ASPIRIN 81 MG/1
81 TABLET ORAL DAILY
Status: CANCELLED | OUTPATIENT
Start: 2022-05-08

## 2022-05-13 PROCEDURE — S0260 H&P FOR SURGERY: HCPCS | Performed by: INTERNAL MEDICINE

## 2022-05-16 ENCOUNTER — LAB (OUTPATIENT)
Dept: LAB | Facility: HOSPITAL | Age: 82
End: 2022-05-16

## 2022-05-16 DIAGNOSIS — Z01.812 PRE-PROCEDURE LAB EXAM: ICD-10-CM

## 2022-05-16 PROCEDURE — U0004 COV-19 TEST NON-CDC HGH THRU: HCPCS

## 2022-05-17 LAB — SARS-COV-2 RNA PNL SPEC NAA+PROBE: NOT DETECTED

## 2022-05-19 ENCOUNTER — HOSPITAL ENCOUNTER (OUTPATIENT)
Dept: CARDIOLOGY | Facility: HOSPITAL | Age: 82
Discharge: HOME | End: 2022-05-19

## 2022-05-19 ENCOUNTER — HOSPITAL ENCOUNTER (OUTPATIENT)
Facility: HOSPITAL | Age: 82
Setting detail: HOSPITAL OUTPATIENT SURGERY
Discharge: HOME | End: 2022-05-19
Attending: INTERNAL MEDICINE | Admitting: INTERNAL MEDICINE

## 2022-05-19 ENCOUNTER — APPOINTMENT (OUTPATIENT)
Dept: CARDIOLOGY | Facility: HOSPITAL | Age: 82
End: 2022-05-19

## 2022-05-19 ENCOUNTER — APPOINTMENT (OUTPATIENT)
Dept: CT IMAGING | Facility: HOSPITAL | Age: 82
End: 2022-05-19

## 2022-05-19 VITALS
OXYGEN SATURATION: 97 % | SYSTOLIC BLOOD PRESSURE: 146 MMHG | BODY MASS INDEX: 27.81 KG/M2 | WEIGHT: 156.97 LBS | HEART RATE: 87 BPM | TEMPERATURE: 97.3 F | RESPIRATION RATE: 16 BRPM | HEIGHT: 63 IN | DIASTOLIC BLOOD PRESSURE: 84 MMHG

## 2022-05-19 VITALS — HEIGHT: 63 IN | BODY MASS INDEX: 27.64 KG/M2 | WEIGHT: 156 LBS

## 2022-05-19 DIAGNOSIS — E78.2 MIXED HYPERLIPIDEMIA: ICD-10-CM

## 2022-05-19 DIAGNOSIS — I10 PRIMARY HYPERTENSION: ICD-10-CM

## 2022-05-19 DIAGNOSIS — Z87.898 H/O SYNCOPE: ICD-10-CM

## 2022-05-19 DIAGNOSIS — I38 VALVULAR HEART DISEASE: ICD-10-CM

## 2022-05-19 DIAGNOSIS — I35.0 AORTIC STENOSIS, SEVERE: ICD-10-CM

## 2022-05-19 DIAGNOSIS — I38 VALVULAR HEART DISEASE: Primary | ICD-10-CM

## 2022-05-19 DIAGNOSIS — R26.81 UNSTEADINESS ON FEET: ICD-10-CM

## 2022-05-19 LAB
ALBUMIN SERPL-MCNC: 4.7 G/DL (ref 3.5–5.2)
ALBUMIN/GLOB SERPL: 2 G/DL
ALP SERPL-CCNC: 96 U/L (ref 39–117)
ALT SERPL W P-5'-P-CCNC: 16 U/L (ref 1–33)
ANION GAP SERPL CALCULATED.3IONS-SCNC: 9 MMOL/L (ref 5–15)
AST SERPL-CCNC: 21 U/L (ref 1–32)
BH CV ECHO MEAS - AO MAX PG: 104.6 MMHG
BH CV ECHO MEAS - AO MEAN PG: 56.7 MMHG
BH CV ECHO MEAS - AO V2 MAX: 511.4 CM/SEC
BH CV ECHO MEAS - AO V2 VTI: 129.5 CM
BH CV XLRA MEAS LEFT DIST CCA EDV: -19.6 CM/SEC
BH CV XLRA MEAS LEFT DIST CCA PSV: -73.3 CM/SEC
BH CV XLRA MEAS LEFT DIST ICA EDV: -39.8 CM/SEC
BH CV XLRA MEAS LEFT DIST ICA PSV: -117.3 CM/SEC
BH CV XLRA MEAS LEFT ICA/CCA RATIO: 1.23
BH CV XLRA MEAS LEFT MID CCA EDV: 15.3 CM/SEC
BH CV XLRA MEAS LEFT MID CCA PSV: 64.6 CM/SEC
BH CV XLRA MEAS LEFT MID ICA EDV: -21.8 CM/SEC
BH CV XLRA MEAS LEFT MID ICA PSV: -79.9 CM/SEC
BH CV XLRA MEAS LEFT PROX CCA EDV: 14.7 CM/SEC
BH CV XLRA MEAS LEFT PROX CCA PSV: 80.3 CM/SEC
BH CV XLRA MEAS LEFT PROX ECA PSV: -50.6 CM/SEC
BH CV XLRA MEAS LEFT PROX ICA EDV: -16.2 CM/SEC
BH CV XLRA MEAS LEFT PROX ICA PSV: -67.7 CM/SEC
BH CV XLRA MEAS LEFT PROX SCLA PSV: 121.5 CM/SEC
BH CV XLRA MEAS LEFT VERTEBRAL A EDV: -14 CM/SEC
BH CV XLRA MEAS LEFT VERTEBRAL A PSV: -35.8 CM/SEC
BH CV XLRA MEAS RIGHT DIST CCA EDV: -14.3 CM/SEC
BH CV XLRA MEAS RIGHT DIST CCA PSV: -81.1 CM/SEC
BH CV XLRA MEAS RIGHT DIST ICA EDV: -17.1 CM/SEC
BH CV XLRA MEAS RIGHT DIST ICA PSV: -63.2 CM/SEC
BH CV XLRA MEAS RIGHT ICA/CCA RATIO: 1.2
BH CV XLRA MEAS RIGHT MID CCA EDV: 14.9 CM/SEC
BH CV XLRA MEAS RIGHT MID CCA PSV: 64 CM/SEC
BH CV XLRA MEAS RIGHT MID ICA EDV: -22.1 CM/SEC
BH CV XLRA MEAS RIGHT MID ICA PSV: -77.2 CM/SEC
BH CV XLRA MEAS RIGHT PROX CCA EDV: -14.1 CM/SEC
BH CV XLRA MEAS RIGHT PROX CCA PSV: -80.9 CM/SEC
BH CV XLRA MEAS RIGHT PROX ECA PSV: -83.8 CM/SEC
BH CV XLRA MEAS RIGHT PROX ICA EDV: 13.8 CM/SEC
BH CV XLRA MEAS RIGHT PROX ICA PSV: 68.4 CM/SEC
BH CV XLRA MEAS RIGHT PROX SCLA PSV: -65.2 CM/SEC
BH CV XLRA MEAS RIGHT VERTEBRAL A EDV: 21.6 CM/SEC
BH CV XLRA MEAS RIGHT VERTEBRAL A PSV: 92.9 CM/SEC
BILIRUB SERPL-MCNC: 1 MG/DL (ref 0–1.2)
BUN SERPL-MCNC: 21 MG/DL (ref 8–23)
BUN/CREAT SERPL: 25.3 (ref 7–25)
CALCIUM SPEC-SCNC: 9.5 MG/DL (ref 8.6–10.5)
CHLORIDE SERPL-SCNC: 107 MMOL/L (ref 98–107)
CHOLEST SERPL-MCNC: 135 MG/DL (ref 0–200)
CO2 SERPL-SCNC: 25 MMOL/L (ref 22–29)
CREAT BLDA-MCNC: 0.8 MG/DL (ref 0.6–1.3)
CREAT SERPL-MCNC: 0.83 MG/DL (ref 0.57–1)
DEPRECATED RDW RBC AUTO: 47.4 FL (ref 37–54)
EGFRCR SERPLBLD CKD-EPI 2021: 70.5 ML/MIN/1.73
ERYTHROCYTE [DISTWIDTH] IN BLOOD BY AUTOMATED COUNT: 14.2 % (ref 12.3–15.4)
GLOBULIN UR ELPH-MCNC: 2.4 GM/DL
GLUCOSE SERPL-MCNC: 93 MG/DL (ref 65–99)
HBA1C MFR BLD: 4.4 % (ref 4.8–5.6)
HCT VFR BLD AUTO: 28.9 % (ref 34–46.6)
HDLC SERPL-MCNC: 72 MG/DL (ref 40–60)
HGB BLD-MCNC: 9.3 G/DL (ref 12–15.9)
LDLC SERPL CALC-MCNC: 51 MG/DL (ref 0–100)
LDLC/HDLC SERPL: 0.71 {RATIO}
LV EF 2D ECHO EST: 60 %
MAXIMAL PREDICTED HEART RATE: 138 BPM
MAXIMAL PREDICTED HEART RATE: 138 BPM
MCH RBC QN AUTO: 30.8 PG (ref 26.6–33)
MCHC RBC AUTO-ENTMCNC: 32.2 G/DL (ref 31.5–35.7)
MCV RBC AUTO: 95.7 FL (ref 79–97)
PLATELET # BLD AUTO: 197 10*3/MM3 (ref 140–450)
PMV BLD AUTO: 9.7 FL (ref 6–12)
POTASSIUM SERPL-SCNC: 4.4 MMOL/L (ref 3.5–5.2)
PROT SERPL-MCNC: 7.1 G/DL (ref 6–8.5)
RBC # BLD AUTO: 3.02 10*6/MM3 (ref 3.77–5.28)
RIGHT ARM BP: NORMAL MMHG
SODIUM SERPL-SCNC: 141 MMOL/L (ref 136–145)
STJ: 2.6 CM
STRESS TARGET HR: 117 BPM
STRESS TARGET HR: 117 BPM
TRIGL SERPL-MCNC: 58 MG/DL (ref 0–150)
VLDLC SERPL-MCNC: 12 MG/DL (ref 5–40)
WBC NRBC COR # BLD: 5.37 10*3/MM3 (ref 3.4–10.8)

## 2022-05-19 PROCEDURE — 25010000002 MIDAZOLAM PER 1 MG: Performed by: INTERNAL MEDICINE

## 2022-05-19 PROCEDURE — 93321 DOPPLER ECHO F-UP/LMTD STD: CPT

## 2022-05-19 PROCEDURE — 93454 CORONARY ARTERY ANGIO S&I: CPT | Performed by: INTERNAL MEDICINE

## 2022-05-19 PROCEDURE — 93321 DOPPLER ECHO F-UP/LMTD STD: CPT | Performed by: INTERNAL MEDICINE

## 2022-05-19 PROCEDURE — C1760 CLOSURE DEV, VASC: HCPCS | Performed by: INTERNAL MEDICINE

## 2022-05-19 PROCEDURE — 82565 ASSAY OF CREATININE: CPT

## 2022-05-19 PROCEDURE — C1894 INTRO/SHEATH, NON-LASER: HCPCS | Performed by: INTERNAL MEDICINE

## 2022-05-19 PROCEDURE — 36415 COLL VENOUS BLD VENIPUNCTURE: CPT

## 2022-05-19 PROCEDURE — 93880 EXTRACRANIAL BILAT STUDY: CPT

## 2022-05-19 PROCEDURE — 93325 DOPPLER ECHO COLOR FLOW MAPG: CPT

## 2022-05-19 PROCEDURE — 80053 COMPREHEN METABOLIC PANEL: CPT

## 2022-05-19 PROCEDURE — 0 IOPAMIDOL PER 1 ML: Performed by: INTERNAL MEDICINE

## 2022-05-19 PROCEDURE — 85027 COMPLETE CBC AUTOMATED: CPT

## 2022-05-19 PROCEDURE — 93312 ECHO TRANSESOPHAGEAL: CPT | Performed by: INTERNAL MEDICINE

## 2022-05-19 PROCEDURE — 93325 DOPPLER ECHO COLOR FLOW MAPG: CPT | Performed by: INTERNAL MEDICINE

## 2022-05-19 PROCEDURE — 80061 LIPID PANEL: CPT

## 2022-05-19 PROCEDURE — 71275 CT ANGIOGRAPHY CHEST: CPT

## 2022-05-19 PROCEDURE — 93880 EXTRACRANIAL BILAT STUDY: CPT | Performed by: INTERNAL MEDICINE

## 2022-05-19 PROCEDURE — 93312 ECHO TRANSESOPHAGEAL: CPT

## 2022-05-19 PROCEDURE — 74174 CTA ABD&PLVS W/CONTRAST: CPT

## 2022-05-19 PROCEDURE — 83036 HEMOGLOBIN GLYCOSYLATED A1C: CPT

## 2022-05-19 RX ORDER — SODIUM CHLORIDE 9 MG/ML
1-3 INJECTION, SOLUTION INTRAVENOUS CONTINUOUS
Status: DISCONTINUED | OUTPATIENT
Start: 2022-05-19 | End: 2022-05-19 | Stop reason: HOSPADM

## 2022-05-19 RX ORDER — ACETAMINOPHEN 325 MG/1
650 TABLET ORAL EVERY 4 HOURS PRN
Status: DISCONTINUED | OUTPATIENT
Start: 2022-05-19 | End: 2022-05-19 | Stop reason: HOSPADM

## 2022-05-19 RX ORDER — SODIUM CHLORIDE 0.9 % (FLUSH) 0.9 %
10 SYRINGE (ML) INJECTION AS NEEDED
Status: DISCONTINUED | OUTPATIENT
Start: 2022-05-19 | End: 2022-05-19 | Stop reason: HOSPADM

## 2022-05-19 RX ORDER — FLUMAZENIL 0.1 MG/ML
INJECTION INTRAVENOUS
Status: DISCONTINUED
Start: 2022-05-19 | End: 2022-05-19 | Stop reason: WASHOUT

## 2022-05-19 RX ORDER — ASPIRIN 81 MG/1
324 TABLET, CHEWABLE ORAL ONCE
Status: COMPLETED | OUTPATIENT
Start: 2022-05-19 | End: 2022-05-19

## 2022-05-19 RX ORDER — FENTANYL CITRATE 50 UG/ML
INJECTION, SOLUTION INTRAMUSCULAR; INTRAVENOUS
Status: DISCONTINUED
Start: 2022-05-19 | End: 2022-05-19 | Stop reason: WASHOUT

## 2022-05-19 RX ORDER — MIDAZOLAM HYDROCHLORIDE 1 MG/ML
INJECTION INTRAMUSCULAR; INTRAVENOUS AS NEEDED
Status: DISCONTINUED | OUTPATIENT
Start: 2022-05-19 | End: 2022-05-19 | Stop reason: HOSPADM

## 2022-05-19 RX ORDER — MIDAZOLAM HYDROCHLORIDE 1 MG/ML
INJECTION INTRAMUSCULAR; INTRAVENOUS
Status: DISCONTINUED
Start: 2022-05-19 | End: 2022-05-19 | Stop reason: HOSPADM

## 2022-05-19 RX ORDER — MIDAZOLAM HYDROCHLORIDE 1 MG/ML
INJECTION INTRAMUSCULAR; INTRAVENOUS
Status: COMPLETED | OUTPATIENT
Start: 2022-05-19 | End: 2022-05-19

## 2022-05-19 RX ORDER — LIDOCAINE HYDROCHLORIDE 10 MG/ML
INJECTION, SOLUTION EPIDURAL; INFILTRATION; INTRACAUDAL; PERINEURAL AS NEEDED
Status: DISCONTINUED | OUTPATIENT
Start: 2022-05-19 | End: 2022-05-19 | Stop reason: HOSPADM

## 2022-05-19 RX ORDER — SODIUM CHLORIDE 0.9 % (FLUSH) 0.9 %
10 SYRINGE (ML) INJECTION EVERY 12 HOURS SCHEDULED
Status: DISCONTINUED | OUTPATIENT
Start: 2022-05-19 | End: 2022-05-19 | Stop reason: HOSPADM

## 2022-05-19 RX ORDER — ASPIRIN 81 MG/1
81 TABLET ORAL DAILY
Status: DISCONTINUED | OUTPATIENT
Start: 2022-05-20 | End: 2022-05-19 | Stop reason: HOSPADM

## 2022-05-19 RX ORDER — SODIUM CHLORIDE 9 MG/ML
100 INJECTION, SOLUTION INTRAVENOUS CONTINUOUS
Status: ACTIVE | OUTPATIENT
Start: 2022-05-19 | End: 2022-05-19

## 2022-05-19 RX ORDER — NALOXONE HYDROCHLORIDE 0.4 MG/ML
INJECTION, SOLUTION INTRAMUSCULAR; INTRAVENOUS; SUBCUTANEOUS
Status: DISCONTINUED
Start: 2022-05-19 | End: 2022-05-19 | Stop reason: WASHOUT

## 2022-05-19 RX ADMIN — MIDAZOLAM 2 MG: 1 INJECTION INTRAMUSCULAR; INTRAVENOUS at 15:36

## 2022-05-19 RX ADMIN — IOPAMIDOL 100 ML: 755 INJECTION, SOLUTION INTRAVENOUS at 18:35

## 2022-05-19 RX ADMIN — ASPIRIN 81 MG 243 MG: 81 TABLET ORAL at 10:51

## 2022-05-19 RX ADMIN — SODIUM CHLORIDE 2.99 ML/KG/HR: 9 INJECTION, SOLUTION INTRAVENOUS at 09:55

## 2022-05-19 RX ADMIN — Medication 10 ML: at 10:53

## 2022-05-19 NOTE — PROGRESS NOTES
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  AXEL and cardiac cath completed today.  See orders below for CTA and carotid.  Dr. Wade has been notified for consultation as well.    TAVR Pre-Op Checklist    Patient Name: Elizabeth Caceres   82 y.o. [id number]  Residence: [place], [place]    Referral Date: 22 : 1940   Ht:63 in (160 cm) Wt: 156# (71.2 kg)    Referring Cardiologist: Jacqui  BMI: 27.81 kg/m2    Initial TTE date:Adult Transthoracic Echo Complete W/ Cont if Necessary Per Protocol (2022 09:38)    SHARA: 0.51 AV mean: 65 AV Vmax: 5.56 m/sec LVEF: 61.65%    Coordinator H&P:P    CT Surgery Consult:P     STS Score: P  Creatinine Clearance:P   Functional Assessment: P    Allergy (Contrast):Carvedilol   Pre-op meds:NA    Anticoagulated: No Last dose:NA Heparin or Lovenox Bridge: NA    CTA Date: P     CTA 3D: P    Left Coronary Ht: P  Right Coronary Ht: P  Annulus Area: P    CTA Important findings: P    Cardiac cath:   Cardiac Catheterization/Vascular Study (2022 13:15)    Physician: Jacqui   Important findings: near normal coronaries    AXEL: 22        Physician: Jacqui   AXEL annulus: P  Projected TAVR Prosthesis Size: P    Carotid duplex:  P    Dobutamine GXT: P      EKG rhythm:  ECG 12 Lead (04/15/2022 23:23)    PPM/ Last download : P    PFT (FEV1):P    Important meds: ASA    PAT lab review:  BNP: P BUN/Creatinine: P H&H:P  PLTS:P    P2Y12:P HGA1C: P CXR:P      TAVR Procedure Date: potentially 22

## 2022-05-20 ENCOUNTER — DOCUMENTATION (OUTPATIENT)
Dept: CARDIAC REHAB | Facility: HOSPITAL | Age: 82
End: 2022-05-20

## 2022-05-20 NOTE — PROGRESS NOTES
Referral received for Phase II Cardiac Rehab.  Staff has reviewed chart and patient does not have a qualifying diagnosis for Phase II Cardiac Rehab at this time.  Staff will follow up after TAVR procedure.

## 2022-06-06 ENCOUNTER — TELEPHONE (OUTPATIENT)
Dept: CARDIOLOGY | Facility: HOSPITAL | Age: 82
End: 2022-06-06

## 2022-06-06 NOTE — TELEPHONE ENCOUNTER
ANIA KELLOGG    Returned call to Destiny Carpenter, daughter of Elizabeth Caceres.  She states he mother is having low BP and high heart rates as well as near passing out.  HARIKA TAVR is tentatively set for 8/8/22.  Advised if patient appears to be unstable, please go to nearest ER ( [facility]) or if not unstable, be sure to keep clinic appointment with Dr. Osman 7/19/22.      Destiny stated she would let her mother decide which option she wishes to pursue.    Sue KELLOGG

## 2022-07-05 ENCOUNTER — APPOINTMENT (OUTPATIENT)
Dept: GENERAL RADIOLOGY | Facility: HOSPITAL | Age: 82
End: 2022-07-05

## 2022-07-05 ENCOUNTER — HOSPITAL ENCOUNTER (INPATIENT)
Facility: HOSPITAL | Age: 82
LOS: 2 days | Discharge: HOME | End: 2022-07-08
Attending: EMERGENCY MEDICINE | Admitting: STUDENT IN AN ORGANIZED HEALTH CARE EDUCATION/TRAINING PROGRAM

## 2022-07-05 DIAGNOSIS — I35.0 AORTIC STENOSIS, SEVERE: ICD-10-CM

## 2022-07-05 DIAGNOSIS — I38 CONGESTIVE HEART FAILURE DUE TO VALVULAR DISEASE: Primary | ICD-10-CM

## 2022-07-05 DIAGNOSIS — I50.9 CONGESTIVE HEART FAILURE DUE TO VALVULAR DISEASE: Primary | ICD-10-CM

## 2022-07-05 LAB
ALBUMIN SERPL-MCNC: 4.1 G/DL (ref 3.5–5.2)
ALBUMIN/GLOB SERPL: 1.6 G/DL
ALP SERPL-CCNC: 115 U/L (ref 39–117)
ALT SERPL W P-5'-P-CCNC: 52 U/L (ref 1–33)
ANION GAP SERPL CALCULATED.3IONS-SCNC: 10 MMOL/L (ref 5–15)
AST SERPL-CCNC: 42 U/L (ref 1–32)
BASOPHILS # BLD AUTO: 0.03 10*3/MM3 (ref 0–0.2)
BASOPHILS NFR BLD AUTO: 0.4 % (ref 0–1.5)
BILIRUB SERPL-MCNC: 0.7 MG/DL (ref 0–1.2)
BUN SERPL-MCNC: 25 MG/DL (ref 8–23)
BUN/CREAT SERPL: 24.8 (ref 7–25)
CALCIUM SPEC-SCNC: 8.9 MG/DL (ref 8.6–10.5)
CHLORIDE SERPL-SCNC: 108 MMOL/L (ref 98–107)
CO2 SERPL-SCNC: 25 MMOL/L (ref 22–29)
CREAT SERPL-MCNC: 1.01 MG/DL (ref 0.57–1)
DEPRECATED RDW RBC AUTO: 49.6 FL (ref 37–54)
DEVELOPER EXPIRATION DATE: 2025
DEVELOPER LOT NUMBER: NORMAL
EGFRCR SERPLBLD CKD-EPI 2021: 55.7 ML/MIN/1.73
EOSINOPHIL # BLD AUTO: 0.12 10*3/MM3 (ref 0–0.4)
EOSINOPHIL NFR BLD AUTO: 1.8 % (ref 0.3–6.2)
ERYTHROCYTE [DISTWIDTH] IN BLOOD BY AUTOMATED COUNT: 14.9 % (ref 12.3–15.4)
EXPIRATION DATE: NORMAL
FECAL OCCULT BLOOD SCREEN, POC: NEGATIVE
FERRITIN SERPL-MCNC: 25.41 NG/ML (ref 13–150)
FOLATE SERPL-MCNC: 15.5 NG/ML (ref 4.78–24.2)
GLOBULIN UR ELPH-MCNC: 2.6 GM/DL
GLUCOSE SERPL-MCNC: 96 MG/DL (ref 65–99)
HCT VFR BLD AUTO: 28.8 % (ref 34–46.6)
HGB BLD-MCNC: 8.7 G/DL (ref 12–15.9)
HOLD SPECIMEN: NORMAL
IMM GRANULOCYTES # BLD AUTO: 0.03 10*3/MM3 (ref 0–0.05)
IMM GRANULOCYTES NFR BLD AUTO: 0.4 % (ref 0–0.5)
IRON 24H UR-MRATE: 24 MCG/DL (ref 37–145)
IRON SATN MFR SERPL: 6 % (ref 20–50)
LYMPHOCYTES # BLD AUTO: 1.07 10*3/MM3 (ref 0.7–3.1)
LYMPHOCYTES NFR BLD AUTO: 15.6 % (ref 19.6–45.3)
Lab: NORMAL
MCH RBC QN AUTO: 27.8 PG (ref 26.6–33)
MCHC RBC AUTO-ENTMCNC: 30.2 G/DL (ref 31.5–35.7)
MCV RBC AUTO: 92 FL (ref 79–97)
MONOCYTES # BLD AUTO: 0.61 10*3/MM3 (ref 0.1–0.9)
MONOCYTES NFR BLD AUTO: 8.9 % (ref 5–12)
NEGATIVE CONTROL: NEGATIVE
NEUTROPHILS NFR BLD AUTO: 4.99 10*3/MM3 (ref 1.7–7)
NEUTROPHILS NFR BLD AUTO: 72.9 % (ref 42.7–76)
NRBC BLD AUTO-RTO: 0 /100 WBC (ref 0–0.2)
NT-PROBNP SERPL-MCNC: 5358 PG/ML (ref 0–1800)
PLATELET # BLD AUTO: 201 10*3/MM3 (ref 140–450)
PMV BLD AUTO: 10.2 FL (ref 6–12)
POSITIVE CONTROL: POSITIVE
POTASSIUM SERPL-SCNC: 4.1 MMOL/L (ref 3.5–5.2)
PROT SERPL-MCNC: 6.7 G/DL (ref 6–8.5)
QT INTERVAL: 372 MS
QT INTERVAL: 394 MS
QTC INTERVAL: 462 MS
QTC INTERVAL: 469 MS
RBC # BLD AUTO: 3.13 10*6/MM3 (ref 3.77–5.28)
SODIUM SERPL-SCNC: 143 MMOL/L (ref 136–145)
TIBC SERPL-MCNC: 428 MCG/DL (ref 298–536)
TRANSFERRIN SERPL-MCNC: 287 MG/DL (ref 200–360)
TROPONIN T SERPL-MCNC: <0.01 NG/ML (ref 0–0.03)
TROPONIN T SERPL-MCNC: <0.01 NG/ML (ref 0–0.03)
VIT B12 BLD-MCNC: >2000 PG/ML (ref 211–946)
WBC NRBC COR # BLD: 6.85 10*3/MM3 (ref 3.4–10.8)
WHOLE BLOOD HOLD COAG: NORMAL
WHOLE BLOOD HOLD SPECIMEN: NORMAL

## 2022-07-05 PROCEDURE — 84466 ASSAY OF TRANSFERRIN: CPT | Performed by: NURSE PRACTITIONER

## 2022-07-05 PROCEDURE — 85025 COMPLETE CBC W/AUTO DIFF WBC: CPT | Performed by: EMERGENCY MEDICINE

## 2022-07-05 PROCEDURE — 82746 ASSAY OF FOLIC ACID SERUM: CPT | Performed by: NURSE PRACTITIONER

## 2022-07-05 PROCEDURE — 82728 ASSAY OF FERRITIN: CPT | Performed by: NURSE PRACTITIONER

## 2022-07-05 PROCEDURE — 99223 1ST HOSP IP/OBS HIGH 75: CPT | Performed by: INTERNAL MEDICINE

## 2022-07-05 PROCEDURE — 99284 EMERGENCY DEPT VISIT MOD MDM: CPT

## 2022-07-05 PROCEDURE — 82607 VITAMIN B-12: CPT | Performed by: NURSE PRACTITIONER

## 2022-07-05 PROCEDURE — 82270 OCCULT BLOOD FECES: CPT | Performed by: EMERGENCY MEDICINE

## 2022-07-05 PROCEDURE — U0004 COV-19 TEST NON-CDC HGH THRU: HCPCS | Performed by: INTERNAL MEDICINE

## 2022-07-05 PROCEDURE — 83880 ASSAY OF NATRIURETIC PEPTIDE: CPT | Performed by: EMERGENCY MEDICINE

## 2022-07-05 PROCEDURE — 25010000002 HEPARIN (PORCINE) PER 1000 UNITS: Performed by: NURSE PRACTITIONER

## 2022-07-05 PROCEDURE — 80053 COMPREHEN METABOLIC PANEL: CPT | Performed by: EMERGENCY MEDICINE

## 2022-07-05 PROCEDURE — 93005 ELECTROCARDIOGRAM TRACING: CPT | Performed by: EMERGENCY MEDICINE

## 2022-07-05 PROCEDURE — 84484 ASSAY OF TROPONIN QUANT: CPT | Performed by: EMERGENCY MEDICINE

## 2022-07-05 PROCEDURE — 71045 X-RAY EXAM CHEST 1 VIEW: CPT

## 2022-07-05 PROCEDURE — 25010000002 FUROSEMIDE PER 20 MG: Performed by: PHYSICIAN ASSISTANT

## 2022-07-05 PROCEDURE — 83540 ASSAY OF IRON: CPT | Performed by: NURSE PRACTITIONER

## 2022-07-05 RX ORDER — ONDANSETRON 4 MG/1
4 TABLET, FILM COATED ORAL EVERY 6 HOURS PRN
Status: DISCONTINUED | OUTPATIENT
Start: 2022-07-05 | End: 2022-07-07

## 2022-07-05 RX ORDER — MELATONIN
1000 NIGHTLY
Status: DISCONTINUED | OUTPATIENT
Start: 2022-07-05 | End: 2022-07-08 | Stop reason: HOSPADM

## 2022-07-05 RX ORDER — SODIUM CHLORIDE 0.9 % (FLUSH) 0.9 %
10 SYRINGE (ML) INJECTION AS NEEDED
Status: DISCONTINUED | OUTPATIENT
Start: 2022-07-05 | End: 2022-07-08

## 2022-07-05 RX ORDER — AMOXICILLIN 250 MG
2 CAPSULE ORAL 2 TIMES DAILY PRN
Status: DISCONTINUED | OUTPATIENT
Start: 2022-07-05 | End: 2022-07-08 | Stop reason: HOSPADM

## 2022-07-05 RX ORDER — SODIUM CHLORIDE 0.9 % (FLUSH) 0.9 %
10 SYRINGE (ML) INJECTION AS NEEDED
Status: DISCONTINUED | OUTPATIENT
Start: 2022-07-05 | End: 2022-07-08 | Stop reason: HOSPADM

## 2022-07-05 RX ORDER — ACETAMINOPHEN 325 MG/1
650 TABLET ORAL EVERY 4 HOURS PRN
Status: DISCONTINUED | OUTPATIENT
Start: 2022-07-05 | End: 2022-07-07

## 2022-07-05 RX ORDER — LANOLIN ALCOHOL/MO/W.PET/CERES
1000 CREAM (GRAM) TOPICAL NIGHTLY
Status: DISCONTINUED | OUTPATIENT
Start: 2022-07-05 | End: 2022-07-08 | Stop reason: HOSPADM

## 2022-07-05 RX ORDER — HEPARIN SODIUM 5000 [USP'U]/ML
5000 INJECTION, SOLUTION INTRAVENOUS; SUBCUTANEOUS EVERY 12 HOURS SCHEDULED
Status: DISCONTINUED | OUTPATIENT
Start: 2022-07-05 | End: 2022-07-07

## 2022-07-05 RX ORDER — LOSARTAN POTASSIUM 50 MG/1
50 TABLET ORAL DAILY
Status: DISCONTINUED | OUTPATIENT
Start: 2022-07-05 | End: 2022-07-08 | Stop reason: HOSPADM

## 2022-07-05 RX ORDER — ONDANSETRON 2 MG/ML
4 INJECTION INTRAMUSCULAR; INTRAVENOUS EVERY 6 HOURS PRN
Status: DISCONTINUED | OUTPATIENT
Start: 2022-07-05 | End: 2022-07-07

## 2022-07-05 RX ORDER — BISACODYL 5 MG/1
5 TABLET, DELAYED RELEASE ORAL DAILY PRN
Status: DISCONTINUED | OUTPATIENT
Start: 2022-07-05 | End: 2022-07-08 | Stop reason: HOSPADM

## 2022-07-05 RX ORDER — ACETAMINOPHEN 650 MG/1
650 SUPPOSITORY RECTAL EVERY 4 HOURS PRN
Status: DISCONTINUED | OUTPATIENT
Start: 2022-07-05 | End: 2022-07-07

## 2022-07-05 RX ORDER — BISACODYL 10 MG
10 SUPPOSITORY, RECTAL RECTAL DAILY PRN
Status: DISCONTINUED | OUTPATIENT
Start: 2022-07-05 | End: 2022-07-08 | Stop reason: HOSPADM

## 2022-07-05 RX ORDER — ASPIRIN 81 MG/1
81 TABLET ORAL DAILY
Status: DISCONTINUED | OUTPATIENT
Start: 2022-07-05 | End: 2022-07-07

## 2022-07-05 RX ORDER — IPRATROPIUM BROMIDE AND ALBUTEROL SULFATE 2.5; .5 MG/3ML; MG/3ML
3 SOLUTION RESPIRATORY (INHALATION) EVERY 6 HOURS PRN
Status: DISCONTINUED | OUTPATIENT
Start: 2022-07-05 | End: 2022-07-08 | Stop reason: HOSPADM

## 2022-07-05 RX ORDER — SODIUM CHLORIDE 0.9 % (FLUSH) 0.9 %
10 SYRINGE (ML) INJECTION EVERY 12 HOURS SCHEDULED
Status: DISCONTINUED | OUTPATIENT
Start: 2022-07-05 | End: 2022-07-08 | Stop reason: HOSPADM

## 2022-07-05 RX ORDER — POLYETHYLENE GLYCOL 3350 17 G/17G
17 POWDER, FOR SOLUTION ORAL DAILY PRN
Status: DISCONTINUED | OUTPATIENT
Start: 2022-07-05 | End: 2022-07-08 | Stop reason: HOSPADM

## 2022-07-05 RX ORDER — ATORVASTATIN CALCIUM 20 MG/1
20 TABLET, FILM COATED ORAL DAILY
Status: DISCONTINUED | OUTPATIENT
Start: 2022-07-05 | End: 2022-07-08 | Stop reason: HOSPADM

## 2022-07-05 RX ORDER — ACETAMINOPHEN 160 MG/5ML
650 SOLUTION ORAL EVERY 4 HOURS PRN
Status: DISCONTINUED | OUTPATIENT
Start: 2022-07-05 | End: 2022-07-07

## 2022-07-05 RX ORDER — FUROSEMIDE 10 MG/ML
40 INJECTION INTRAMUSCULAR; INTRAVENOUS ONCE
Status: COMPLETED | OUTPATIENT
Start: 2022-07-05 | End: 2022-07-05

## 2022-07-05 RX ADMIN — HEPARIN SODIUM 5000 UNITS: 5000 INJECTION INTRAVENOUS; SUBCUTANEOUS at 21:25

## 2022-07-05 RX ADMIN — CYANOCOBALAMIN TAB 1000 MCG 1000 MCG: 1000 TAB at 21:25

## 2022-07-05 RX ADMIN — LOSARTAN POTASSIUM 50 MG: 50 TABLET, FILM COATED ORAL at 21:25

## 2022-07-05 RX ADMIN — METOPROLOL TARTRATE 25 MG: 25 TABLET, FILM COATED ORAL at 21:24

## 2022-07-05 RX ADMIN — ATORVASTATIN CALCIUM 20 MG: 20 TABLET, FILM COATED ORAL at 21:25

## 2022-07-05 RX ADMIN — Medication 1000 UNITS: at 21:24

## 2022-07-05 RX ADMIN — ASPIRIN 81 MG: 81 TABLET, COATED ORAL at 21:25

## 2022-07-05 RX ADMIN — FUROSEMIDE 40 MG: 10 INJECTION, SOLUTION INTRAMUSCULAR; INTRAVENOUS at 16:15

## 2022-07-05 RX ADMIN — Medication 10 ML: at 21:25

## 2022-07-05 NOTE — ED PROVIDER NOTES
EMERGENCY DEPARTMENT ENCOUNTER    Pt Name: Elizabeth Caceres  MRN: [id number]  Pt :   1940  Room Number:    Date of encounter:  2022  PCP: Eric Wei MD  ED Provider: Huber Hook PA-C    Historian: Patient and her daughter      HPI:  Chief Complaint: Shortness of breath, dyspnea on exertion        Context: Elizabeth Caceres is a 82 y.o. female who presents to the ED c/o worsening shortness of breath and dyspnea on exertion for the past 2 to 3 weeks.  She is followed by Dr. Osman, and is tentatively scheduled for TAVR .  She has a history of severe aortic valve stenosis status post bioprosthetic aortic valve replacement in  with Dr. Sebastian, hyperlipidemia, COPD, previous tobacco abuse. She states she was told by Dr. Osman to present to the emergency department if she experiences worsening of symptoms.    She has been experiencing worsening shortness of breath and dyspnea on exertion ambulating short distances, progressively worsening baseline shortness of breath, easy fatigability in her upper and lower extremities, labile blood pressures at home.  She does not have home oxygen.  Patient lives in a single wide trailer, and is unable to walk to the bathroom without holding onto the wall and stopping after ambulating very short distances.  No increased lower extremity edema.  No chest arm neck jaw or shoulder pain.  Patient and her  daughter are fearful that if she declines any further that she will not be able to get to the hospital in time, as they live 2-1/2 hours away.      PAST MEDICAL HISTORY  Past Medical History:   Diagnosis Date   • Arthritis    • COPD (chronic obstructive pulmonary disease) (HCC)    • Hyperlipidemia    • Hypertension    • MRSA (methicillin resistant Staphylococcus aureus)     History of MRSA with I&D of lower extremity   • Urinary frequency          PAST SURGICAL HISTORY  Past Surgical History:   Procedure Laterality Date   • AORTIC VALVE  REPAIR/REPLACEMENT  10/2013    Dr. Sebasitan   • APPENDECTOMY N/A 2019    Procedure: APPENDECTOMY LAPAROSCOPIC;  Surgeon: Peter Vargas MD;  Location:  [facility];  Service: General   • CARDIAC CATHETERIZATION Left 2022    Procedure: Left Heart Cath;  Surgeon: Carlton Osman MD;  Location:  [facility];  Service: Cardiology;  Laterality: Left;  SAME DAY AS AXEL   • CARDIAC SURGERY     • COLONOSCOPY     • HIP ARTHROPLASTY Left    • TOTAL LAPAROSCOPIC HYSTERECTOMY SALPINGO OOPHORECTOMY Right 2019    Procedure: LAPAROSCOPIC SALPINGOOPHORECTOMY;  Surgeon: Peter Vargas MD;  Location:  [facility];  Service: General         FAMILY HISTORY  No family history on file.      SOCIAL HISTORY  Social History     Socioeconomic History   • Marital status:    Tobacco Use   • Smoking status: Former Smoker     Quit date:      Years since quittin.5   • Smokeless tobacco: Never Used   Substance and Sexual Activity   • Alcohol use: No   • Drug use: No   • Sexual activity: Defer         ALLERGIES  Carvedilol        REVIEW OF SYSTEMS  Review of Systems   Constitutional: Positive for activity change and fatigue. Negative for appetite change, chills, diaphoresis and fever.   HENT: Negative for congestion, rhinorrhea and sore throat.    Respiratory: Positive for cough and shortness of breath. Negative for chest tightness and wheezing.    Cardiovascular: Negative for chest pain, palpitations and leg swelling.   Gastrointestinal: Negative for abdominal pain, nausea and vomiting.   Genitourinary: Negative for dysuria, flank pain and hematuria.   Musculoskeletal: Negative for arthralgias, back pain, myalgias and neck pain.   Neurological: Positive for weakness (Generalized). Negative for dizziness, syncope and light-headedness.          All systems reviewed and negative except for those discussed in HPI.       PHYSICAL EXAM    I have reviewed the triage vital signs and nursing notes.    ED Triage Vitals  [07/05/22 1311]   Temp Heart Rate Resp BP SpO2   98.5 °F (36.9 °C) 91 24 113/82 95 %      Temp src Heart Rate Source Patient Position BP Location FiO2 (%)   Oral Monitor -- -- --       Physical Exam  GENERAL:   Pleasant awake alert and oriented, not toxic appearing, her heart rate is 92 and regular, oral temp 98.5, blood pressure is 113/82, respirations 24 clear and unlabored, O2 sats 95% on room air.  HENT: Nares patent.  EYES: No scleral icterus.  CV: Regular rhythm, regular rate.  3/6 systolic murmur left sternal border second ICS  RESPIRATORY: Normal effort.  No audible wheezes, rales or rhonchi.  ABDOMEN: Soft, nontender  MUSCULOSKELETAL: No deformities.  No edema  NEURO: Alert, moves all extremities, follows commands.  SKIN: Warm, dry, no rash visualized.        LAB RESULTS  Recent Results (from the past 24 hour(s))   Comprehensive Metabolic Panel    Collection Time: 07/05/22  2:55 PM    Specimen: Blood   Result Value Ref Range    Glucose 96 65 - 99 mg/dL    BUN 25 (H) 8 - 23 mg/dL    Creatinine 1.01 (H) 0.57 - 1.00 mg/dL    Sodium 143 136 - 145 mmol/L    Potassium 4.1 3.5 - 5.2 mmol/L    Chloride 108 (H) 98 - 107 mmol/L    CO2 25.0 22.0 - 29.0 mmol/L    Calcium 8.9 8.6 - 10.5 mg/dL    Total Protein 6.7 6.0 - 8.5 g/dL    Albumin 4.10 3.50 - 5.20 g/dL    ALT (SGPT) 52 (H) 1 - 33 U/L    AST (SGOT) 42 (H) 1 - 32 U/L    Alkaline Phosphatase 115 39 - 117 U/L    Total Bilirubin 0.7 0.0 - 1.2 mg/dL    Globulin 2.6 gm/dL    A/G Ratio 1.6 g/dL    BUN/Creatinine Ratio 24.8 7.0 - 25.0    Anion Gap 10.0 5.0 - 15.0 mmol/L    eGFR 55.7 (L) >60.0 mL/min/1.73   BNP    Collection Time: 07/05/22  2:55 PM    Specimen: Blood   Result Value Ref Range    proBNP 5,358.0 (H) 0.0 - 1,800.0 pg/mL   Troponin    Collection Time: 07/05/22  2:55 PM    Specimen: Blood   Result Value Ref Range    Troponin T <0.010 0.000 - 0.030 ng/mL   Green Top (Gel)    Collection Time: 07/05/22  2:55 PM   Result Value Ref Range    Extra Tube Hold for  add-ons.    Lavender Top    Collection Time: 07/05/22  2:55 PM   Result Value Ref Range    Extra Tube hold for add-on    Gold Top - SST    Collection Time: 07/05/22  2:55 PM   Result Value Ref Range    Extra Tube Hold for add-ons.    Light Blue Top    Collection Time: 07/05/22  2:55 PM   Result Value Ref Range    Extra Tube Hold for add-ons.    CBC Auto Differential    Collection Time: 07/05/22  2:55 PM    Specimen: Blood   Result Value Ref Range    WBC 6.85 3.40 - 10.80 10*3/mm3    RBC 3.13 (L) 3.77 - 5.28 10*6/mm3    Hemoglobin 8.7 (L) 12.0 - 15.9 g/dL    Hematocrit 28.8 (L) 34.0 - 46.6 %    MCV 92.0 79.0 - 97.0 fL    MCH 27.8 26.6 - 33.0 pg    MCHC 30.2 (L) 31.5 - 35.7 g/dL    RDW 14.9 12.3 - 15.4 %    RDW-SD 49.6 37.0 - 54.0 fl    MPV 10.2 6.0 - 12.0 fL    Platelets 201 140 - 450 10*3/mm3    Neutrophil % 72.9 42.7 - 76.0 %    Lymphocyte % 15.6 (L) 19.6 - 45.3 %    Monocyte % 8.9 5.0 - 12.0 %    Eosinophil % 1.8 0.3 - 6.2 %    Basophil % 0.4 0.0 - 1.5 %    Immature Grans % 0.4 0.0 - 0.5 %    Neutrophils, Absolute 4.99 1.70 - 7.00 10*3/mm3    Lymphocytes, Absolute 1.07 0.70 - 3.10 10*3/mm3    Monocytes, Absolute 0.61 0.10 - 0.90 10*3/mm3    Eosinophils, Absolute 0.12 0.00 - 0.40 10*3/mm3    Basophils, Absolute 0.03 0.00 - 0.20 10*3/mm3    Immature Grans, Absolute 0.03 0.00 - 0.05 10*3/mm3    nRBC 0.0 0.0 - 0.2 /100 WBC       If labs were ordered, I independently reviewed the results.        RADIOLOGY  XR Chest 1 View    Result Date: 7/5/2022  DATE OF EXAM: 7/5/2022 1:58 PM  PROCEDURE: XR CHEST 1 VW-  INDICATIONS: SOA triage protocol  COMPARISON: 11/01/2013  TECHNIQUE: Single radiographic AP view of the chest was obtained.  FINDINGS: Status post aortic valve repair. Cardiomegaly. Prominence of the upper lobe pulmonary vessels. Small bilateral pleural effusions. Left greater than right basilar airspace disease      Cardiomegaly with pulmonary venous hypertension and small bilateral pleural effusions. Bibasilar  airspace disease likely represents atelectasis.  This report was finalized on 7/5/2022 2:10 PM by Nico Gabriel.             PROCEDURES    Procedures    ECG 12 Lead   Preliminary Result         ECG 12 Lead   Preliminary Result             MEDICATIONS GIVEN IN ER    Medications   sodium chloride 0.9 % flush 10 mL (has no administration in time range)   furosemide (LASIX) injection 40 mg (has no administration in time range)         PROGRESS, DATA ANALYSIS, CONSULTS, AND MEDICAL DECISION MAKING    All labs have been independently reviewed by me.  All radiology studies have been reviewed by me and the radiologist dictating the report.   EKG's have been independently viewed and interpreted by me.      Differential diagnoses: Worsening of valvular heart disease, congestive heart failure, COPD, infectious process           Her BNP is elevated today, 5358.  Chest x-ray does show some pulmonary vascular congestion.  Discussed with Dr. Osman, who recommends admit to hospitalist service, and he will see patient in the morning.  He is clinic today at [place].      AS OF 16:04 EDT VITALS:    BP - 123/68  HR - 73  TEMP - 98.5 °F (36.9 °C) (Oral)  O2 SATS - 94%                  DIAGNOSIS  Final diagnoses:   Congestive heart failure due to valvular disease (HCC)   Aortic stenosis, severe         DISPOSITION  Admit to hospitalist service           Huber Hook PA-C  07/05/22 3995

## 2022-07-05 NOTE — H&P
[facility] Medicine Services  HISTORY AND PHYSICAL    Patient Name: Elizabeth Caceres  : 1940  MRN: [id number]  Primary Care Physician: Eric Wei MD  Date of admission: 2022    Subjective   Subjective     Chief Complaint:  Increase dyspnea     HPI:  Elizabeth Caceres is a 82 y.o. female with PMH of CHF, COPD, HLD, HTN, Aortic valve replacement. Patient states she has had increased exertional dyspnea over the last 2-3 weeks and orthopnea. She denies any edema or weight gain. She denies any cp, fever, chills, N/V/D. She is tentatively scheduled for a TAVR in august. She has been taking medication as prescribed. She does not use oxygen at home.     In ED: BNP 5358, trop <0.010, creat 1.01, hgb 8.7, WBC 6.85        Review of Systems   Constitutional: Positive for activity change and fatigue. Negative for appetite change and fever.   Respiratory: Positive for cough and shortness of breath. Negative for wheezing.    Cardiovascular: Negative for chest pain and leg swelling.   Gastrointestinal: Negative for constipation, diarrhea, nausea and vomiting.   Genitourinary: Negative for dysuria.        All other systems reviewed and are negative.     Personal History     Past Medical History:   Diagnosis Date   • Arthritis    • Congestive heart failure due to valvular disease (HCC) 2022   • COPD (chronic obstructive pulmonary disease) ([facility])    • Hyperlipidemia    • Hypertension    • MRSA (methicillin resistant Staphylococcus aureus)     History of MRSA with I&D of lower extremity   • Urinary frequency              Past Surgical History:   Procedure Laterality Date   • AORTIC VALVE REPAIR/REPLACEMENT  10/2013    Dr. Sebastian   • APPENDECTOMY N/A 2019    Procedure: APPENDECTOMY LAPAROSCOPIC;  Surgeon: Peter Vargas MD;  Location: [facility] OR;  Service: General   • CARDIAC CATHETERIZATION Left 2022    Procedure: Left Heart Cath;  Surgeon: Carlton Osman MD;  Location:   [facility];  Service: Cardiology;  Laterality: Left;  SAME DAY AS AXEL   • CARDIAC SURGERY     • COLONOSCOPY     • HIP ARTHROPLASTY Left    • TOTAL LAPAROSCOPIC HYSTERECTOMY SALPINGO OOPHORECTOMY Right 4/23/2019    Procedure: LAPAROSCOPIC SALPINGOOPHORECTOMY;  Surgeon: Peter Vargas MD;  Location: [facility] OR;  Service: General       Family History: family history includes Cancer in her father; Heart disease in her brother. Otherwise pertinent FHx was reviewed and unremarkable.     Social History:  reports that she quit smoking about 35 years ago. She has never used smokeless tobacco. She reports that she does not drink alcohol and does not use drugs.  Social History     Social History Narrative   • Not on file       Medications:  aspirin, atorvastatin, cholecalciferol, losartan, metoprolol tartrate, vitamin B-12, and vitamin C    Allergies   Allergen Reactions   • Carvedilol Nausea Only     \"INTOLERANCE\"       Objective   Objective     Vital Signs:   Temp:  [98.5 °F (36.9 °C)] 98.5 °F (36.9 °C)  Heart Rate:  [] 80  Resp:  [20-24] 20  BP: (113-138)/(64-82) 138/70    Physical Exam   Constitutional: Awake, alert  Eyes: PERRLA, sclerae anicteric, no conjunctival injection  HENT: NCAT, mucous membranes moist  Neck: Supple, no thyromegaly, no lymphadenopathy, trachea midline  Respiratory: Clear to auscultation bilaterally, nonlabored respirations room air 93%  Cardiovascular: RRR, + murmurs, rubs, or gallops, palpable pedal pulses bilaterally  Gastrointestinal: Positive bowel sounds, soft, nontender, nondistended  Musculoskeletal: No bilateral ankle edema, no clubbing or cyanosis to extremities  Psychiatric: Appropriate affect, cooperative  Neurologic: Oriented x 3, strength symmetric in all extremities, Cranial Nerves grossly intact to confrontation, speech clear  Skin: No rashes      Results Reviewed:  I have personally reviewed most recent indicated data and agree with findings including:  [x]  " Laboratory  [x]  Radiology  []  EKG/Telemetry  []  Pathology  []  Cardiac/Vascular Studies  []  Old records  []  Other:  Most pertinent findings include:      LAB RESULTS:      Lab 07/05/22  1455   WBC 6.85   HEMOGLOBIN 8.7*   HEMATOCRIT 28.8*   PLATELETS 201   NEUTROS ABS 4.99   IMMATURE GRANS (ABS) 0.03   LYMPHS ABS 1.07   MONOS ABS 0.61   EOS ABS 0.12   MCV 92.0         Lab 07/05/22  1455   SODIUM 143   POTASSIUM 4.1   CHLORIDE 108*   CO2 25.0   ANION GAP 10.0   BUN 25*   CREATININE 1.01*   EGFR 55.7*   GLUCOSE 96   CALCIUM 8.9         Lab 07/05/22  1455   TOTAL PROTEIN 6.7   ALBUMIN 4.10   GLOBULIN 2.6   ALT (SGPT) 52*   AST (SGOT) 42*   BILIRUBIN 0.7   ALK PHOS 115         Lab 07/05/22  1704 07/05/22  1455   PROBNP  --  5,358.0*   TROPONIN T <0.010 <0.010                 Brief Urine Lab Results  (Last result in the past 365 days)      Color   Clarity   Blood   Leuk Est   Nitrite   Protein   CREAT   Urine HCG        04/16/22 0346 Yellow   Clear   Negative   Negative   Negative   Trace               Microbiology Results (last 10 days)     ** No results found for the last 240 hours. **          XR Chest 1 View    Result Date: 7/5/2022  DATE OF EXAM: 7/5/2022 1:58 PM  PROCEDURE: XR CHEST 1 VW-  INDICATIONS: SOA triage protocol  COMPARISON: 11/01/2013  TECHNIQUE: Single radiographic AP view of the chest was obtained.  FINDINGS: Status post aortic valve repair. Cardiomegaly. Prominence of the upper lobe pulmonary vessels. Small bilateral pleural effusions. Left greater than right basilar airspace disease      Impression: Cardiomegaly with pulmonary venous hypertension and small bilateral pleural effusions. Bibasilar airspace disease likely represents atelectasis.  This report was finalized on 7/5/2022 2:10 PM by Nico Gabriel.        Results for orders placed during the hospital encounter of 05/19/22    Adult Transesophageal Echo (AXEL) W/ Cont if Necessary Per Protocol    Interpretation Summary  · Moderate biatrial  enlargement.  · Left ventricular wall thickness is consistent with mild to moderate concentric hypertrophy.  · Normal left ventricular systolic function, estimated EF 60%.  · There is a bioprosthetic aortic valve present with restricted leaflet mobility and severe aortic stenosis. Mean gradient is 56.7 mmHg. STJ 2.6 cm.  · Mild mitral regurgitation.  · Mild tricuspid regurgitation.      Assessment & Plan   Assessment & Plan       HTN (hypertension)    Hyperlipidemia    COPD (chronic obstructive pulmonary disease) (HCC)    Congestive heart failure due to valvular disease (HCC)    CHF due to valvular diease  -- lasix IV given in ED will defer further diuresis to cardiology  -- CXR cardiomegaly with pulm HTN and small luis pleural effusions   -- consult Dr. Osman  -- ECHO in am   -- cont ASA, statin, cozaar and lopressor   -- oxygen as needed     Elevated LFT's  -- labs in am   -- cont statin for now unless continues to worsen     HTN  HLD  -- cont home meds     Anemia  -- FOBT neg  -- check iron profile, folate and B12  -- cont B12 replacement      COPD  -- nebs prn     DVT prophylaxis:  Heparin subq     CODE STATUS:  Full code   Level Of Support Discussed With: Patient  Code Status (Patient has no pulse and is not breathing): CPR (Attempt to Resuscitate)  Medical Interventions (Patient has pulse or is breathing): Full Support      This note has been completed as part of a split-shared workflow.     Signature: Electronically signed by VALDEZ Duarte, 07/05/22, 5:17 PM EDT.      Attending   Admission Attestation       I have seen and examined the patient, performing an independent face-to-face diagnostic evaluation with plan of care reviewed and developed with the advanced practice clinician (APC).      Brief Summary Statement:   Elizabeth Caceres is a 82 y.o. female who states she has noticed increased dyspnea even at rest over the last 3 weeks.  She states the dyspnea is made worse with exertion, even with  normal ambulation in her home.  She also confirms worsening orthopnea when trying to sleep at night.  She denies any chest pain or extremity swelling, also denies any chest pain.  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  She was scheduled for TAVR on 8/8.  Her cardiologist contacted today through the ED, and they recommended admission with cardiology consult for tomorrow.  She also denies fever/chills, nausea/emesis, diaphoresis, focal neurologic deficit, or syncope.    Remainder of detailed HPI is as noted by APC and has been reviewed and/or edited by me for completeness.    Attending Physical Exam:  Constitutional: Awake, alert, NAD, very pleasant.  Eyes: PERRLA, sclerae anicteric, no conjunctival injection  HENT: NCAT, mucous membranes moist.  Neck: Supple, no thyromegaly, no lymphadenopathy, trachea midline  Respiratory: Clear to auscultation bilaterally, nonlabored respirations   Cardiovascular: RRR, 3/6 systolic murmur best heard at the aortic position, no rubs or gallops, palpable pedal pulses bilaterally  Gastrointestinal: Positive bowel sounds, soft, nontender, nondistended  Musculoskeletal: No bilateral ankle edema, no clubbing or cyanosis to extremities  Psychiatric: Appropriate affect, cooperative  Neurologic: Oriented x 3, strength symmetric in all extremities, Cranial Nerves grossly intact to confrontation, speech clear  Skin: No rashes, normal turgor.    Brief Assessment/Plan :  See detailed assessment and plan developed with APC which I have reviewed and/or edited for completeness.        Admission Status: I believe that this patient meets inpatient criteria due to increasing dyspnea on exertion as well as dyspnea at rest and history of a porcine valve in the aortic position, will need cardiology follow-up, new echo; I do feel her care will extend over 2 midnights.      Darrick Cantu,MAITE, DO  07/05/22

## 2022-07-06 ENCOUNTER — ANESTHESIA EVENT (OUTPATIENT)
Dept: PERIOP | Facility: HOSPITAL | Age: 82
End: 2022-07-06

## 2022-07-06 ENCOUNTER — APPOINTMENT (OUTPATIENT)
Dept: CARDIOLOGY | Facility: HOSPITAL | Age: 82
End: 2022-07-06

## 2022-07-06 LAB
ABO GROUP BLD: NORMAL
ABO GROUP BLD: NORMAL
ANION GAP SERPL CALCULATED.3IONS-SCNC: 7 MMOL/L (ref 5–15)
ASCENDING AORTA: 3.1 CM
BASOPHILS # BLD AUTO: 0.04 10*3/MM3 (ref 0–0.2)
BASOPHILS NFR BLD AUTO: 0.7 % (ref 0–1.5)
BH CV ECHO MEAS - AI P1/2T: 534.6 MSEC
BH CV ECHO MEAS - AO MAX PG: 103 MMHG
BH CV ECHO MEAS - AO MEAN PG: 61.7 MMHG
BH CV ECHO MEAS - AO ROOT DIAM: 2.7 CM
BH CV ECHO MEAS - AO V2 MAX: 507.3 CM/SEC
BH CV ECHO MEAS - AO V2 VTI: 123.3 CM
BH CV ECHO MEAS - AVA(I,D): 0.43 CM2
BH CV ECHO MEAS - EDV(CUBED): 115.5 ML
BH CV ECHO MEAS - EDV(MOD-SP2): 66.2 ML
BH CV ECHO MEAS - EDV(MOD-SP4): 66.5 ML
BH CV ECHO MEAS - EF(MOD-BP): 58.3 %
BH CV ECHO MEAS - EF(MOD-SP2): 60 %
BH CV ECHO MEAS - EF(MOD-SP4): 56.1 %
BH CV ECHO MEAS - ESV(CUBED): 20.3 ML
BH CV ECHO MEAS - ESV(MOD-SP2): 26.5 ML
BH CV ECHO MEAS - ESV(MOD-SP4): 29.2 ML
BH CV ECHO MEAS - FS: 44 %
BH CV ECHO MEAS - IVS/LVPW: 0.77 CM
BH CV ECHO MEAS - IVSD: 0.96 CM
BH CV ECHO MEAS - LA DIMENSION: 3.6 CM
BH CV ECHO MEAS - LAT PEAK E' VEL: 4.9 CM/SEC
BH CV ECHO MEAS - LV DIASTOLIC VOL/BSA (35-75): 37.7 CM2
BH CV ECHO MEAS - LV MASS(C)D: 199.2 GRAMS
BH CV ECHO MEAS - LV MAX PG: 2.5 MMHG
BH CV ECHO MEAS - LV MEAN PG: 1.15 MMHG
BH CV ECHO MEAS - LV SYSTOLIC VOL/BSA (12-30): 16.6 CM2
BH CV ECHO MEAS - LV V1 MAX: 79 CM/SEC
BH CV ECHO MEAS - LV V1 VTI: 17.8 CM
BH CV ECHO MEAS - LVIDD: 4.9 CM
BH CV ECHO MEAS - LVIDS: 2.7 CM
BH CV ECHO MEAS - LVOT AREA: 2.9 CM2
BH CV ECHO MEAS - LVOT DIAM: 1.94 CM
BH CV ECHO MEAS - LVPWD: 1.25 CM
BH CV ECHO MEAS - MED PEAK E' VEL: 5.6 CM/SEC
BH CV ECHO MEAS - MR MAX PG: 182.8 MMHG
BH CV ECHO MEAS - MR MAX VEL: 676 CM/SEC
BH CV ECHO MEAS - MR MEAN PG: 128.8 MMHG
BH CV ECHO MEAS - MR MEAN VEL: 548.2 CM/SEC
BH CV ECHO MEAS - MR VTI: 231.6 CM
BH CV ECHO MEAS - MV A MAX VEL: 111.6 CM/SEC
BH CV ECHO MEAS - MV DEC SLOPE: 740.8 CM/SEC2
BH CV ECHO MEAS - MV DEC TIME: 0.19 MSEC
BH CV ECHO MEAS - MV E MAX VEL: 138 CM/SEC
BH CV ECHO MEAS - MV E/A: 1.24
BH CV ECHO MEAS - MV MAX PG: 8.3 MMHG
BH CV ECHO MEAS - MV MEAN PG: 3.7 MMHG
BH CV ECHO MEAS - MV V2 VTI: 32.5 CM
BH CV ECHO MEAS - MVA(VTI): 1.61 CM2
BH CV ECHO MEAS - PA ACC TIME: 0.12 SEC
BH CV ECHO MEAS - PA PR(ACCEL): 25.1 MMHG
BH CV ECHO MEAS - PA V2 MAX: 92.6 CM/SEC
BH CV ECHO MEAS - PI END-D VEL: 118.3 CM/SEC
BH CV ECHO MEAS - RF(MV,LVOT)(1DIAM): 0.72 CM
BH CV ECHO MEAS - SI(MOD-SP2): 22.5 ML/M2
BH CV ECHO MEAS - SI(MOD-SP4): 21.2 ML/M2
BH CV ECHO MEAS - SV(LVOT): 52.5 ML
BH CV ECHO MEAS - SV(MOD-SP2): 39.7 ML
BH CV ECHO MEAS - SV(MOD-SP4): 37.3 ML
BH CV ECHO MEAS - TAPSE (>1.6): 1.5 CM
BH CV ECHO MEAS - TR MAX PG: 41.2 MMHG
BH CV ECHO MEAS - TR MAX VEL: 317.8 CM/SEC
BH CV ECHO MEASUREMENTS AVERAGE E/E' RATIO: 26.29
BH CV VAS BP RIGHT ARM: NORMAL MMHG
BH CV XLRA - RV BASE: 3.3 CM
BH CV XLRA - RV LENGTH: 7.3 CM
BH CV XLRA - RV MID: 2.5 CM
BH CV XLRA - TDI S': 9.6 CM/SEC
BLD GP AB SCN SERPL QL: NEGATIVE
BUN SERPL-MCNC: 29 MG/DL (ref 8–23)
BUN/CREAT SERPL: 32.2 (ref 7–25)
CALCIUM SPEC-SCNC: 9.5 MG/DL (ref 8.6–10.5)
CHLORIDE SERPL-SCNC: 106 MMOL/L (ref 98–107)
CO2 SERPL-SCNC: 29 MMOL/L (ref 22–29)
CREAT SERPL-MCNC: 0.9 MG/DL (ref 0.57–1)
DEPRECATED RDW RBC AUTO: 49.4 FL (ref 37–54)
EGFRCR SERPLBLD CKD-EPI 2021: 64 ML/MIN/1.73
EOSINOPHIL # BLD AUTO: 0.21 10*3/MM3 (ref 0–0.4)
EOSINOPHIL NFR BLD AUTO: 3.6 % (ref 0.3–6.2)
ERYTHROCYTE [DISTWIDTH] IN BLOOD BY AUTOMATED COUNT: 14.7 % (ref 12.3–15.4)
GLUCOSE SERPL-MCNC: 94 MG/DL (ref 65–99)
HCT VFR BLD AUTO: 30.7 % (ref 34–46.6)
HGB BLD-MCNC: 9.3 G/DL (ref 12–15.9)
IMM GRANULOCYTES # BLD AUTO: 0.02 10*3/MM3 (ref 0–0.05)
IMM GRANULOCYTES NFR BLD AUTO: 0.3 % (ref 0–0.5)
IVRT: 74 MSEC
LEFT ATRIUM VOLUME INDEX: 27.6 ML/M2
LV EF 2D ECHO EST: 55 %
LYMPHOCYTES # BLD AUTO: 1.08 10*3/MM3 (ref 0.7–3.1)
LYMPHOCYTES NFR BLD AUTO: 18.5 % (ref 19.6–45.3)
MAXIMAL PREDICTED HEART RATE: 138 BPM
MCH RBC QN AUTO: 28.5 PG (ref 26.6–33)
MCHC RBC AUTO-ENTMCNC: 30.3 G/DL (ref 31.5–35.7)
MCV RBC AUTO: 94.2 FL (ref 79–97)
MONOCYTES # BLD AUTO: 0.55 10*3/MM3 (ref 0.1–0.9)
MONOCYTES NFR BLD AUTO: 9.4 % (ref 5–12)
MR PISA EROA: 0.06 CM2
NEUTROPHILS NFR BLD AUTO: 3.94 10*3/MM3 (ref 1.7–7)
NEUTROPHILS NFR BLD AUTO: 67.5 % (ref 42.7–76)
NRBC BLD AUTO-RTO: 0 /100 WBC (ref 0–0.2)
PISA ALIASING VEL: 3.9 M/S
PISA RADIUS: 0.4 CM
PLATELET # BLD AUTO: 200 10*3/MM3 (ref 140–450)
PMV BLD AUTO: 10.3 FL (ref 6–12)
POTASSIUM SERPL-SCNC: 4.4 MMOL/L (ref 3.5–5.2)
RBC # BLD AUTO: 3.26 10*6/MM3 (ref 3.77–5.28)
RH BLD: POSITIVE
RH BLD: POSITIVE
SARS-COV-2 RNA NOSE QL NAA+PROBE: NOT DETECTED
SODIUM SERPL-SCNC: 142 MMOL/L (ref 136–145)
STRESS TARGET HR: 117 BPM
T&S EXPIRATION DATE: NORMAL
WBC NRBC COR # BLD: 5.84 10*3/MM3 (ref 3.4–10.8)

## 2022-07-06 PROCEDURE — 86900 BLOOD TYPING SEROLOGIC ABO: CPT

## 2022-07-06 PROCEDURE — 86850 RBC ANTIBODY SCREEN: CPT

## 2022-07-06 PROCEDURE — 86901 BLOOD TYPING SEROLOGIC RH(D): CPT

## 2022-07-06 PROCEDURE — 80048 BASIC METABOLIC PNL TOTAL CA: CPT | Performed by: NURSE PRACTITIONER

## 2022-07-06 PROCEDURE — 86923 COMPATIBILITY TEST ELECTRIC: CPT

## 2022-07-06 PROCEDURE — 99222 1ST HOSP IP/OBS MODERATE 55: CPT | Performed by: INTERNAL MEDICINE

## 2022-07-06 PROCEDURE — 99232 SBSQ HOSP IP/OBS MODERATE 35: CPT | Performed by: STUDENT IN AN ORGANIZED HEALTH CARE EDUCATION/TRAINING PROGRAM

## 2022-07-06 PROCEDURE — 93306 TTE W/DOPPLER COMPLETE: CPT

## 2022-07-06 PROCEDURE — 93306 TTE W/DOPPLER COMPLETE: CPT | Performed by: INTERNAL MEDICINE

## 2022-07-06 PROCEDURE — 25010000002 HEPARIN (PORCINE) PER 1000 UNITS: Performed by: NURSE PRACTITIONER

## 2022-07-06 PROCEDURE — G0378 HOSPITAL OBSERVATION PER HR: HCPCS

## 2022-07-06 PROCEDURE — 85025 COMPLETE CBC W/AUTO DIFF WBC: CPT | Performed by: NURSE PRACTITIONER

## 2022-07-06 RX ADMIN — LOSARTAN POTASSIUM 50 MG: 50 TABLET, FILM COATED ORAL at 08:33

## 2022-07-06 RX ADMIN — CYANOCOBALAMIN TAB 1000 MCG 1000 MCG: 1000 TAB at 20:29

## 2022-07-06 RX ADMIN — METOPROLOL TARTRATE 25 MG: 25 TABLET, FILM COATED ORAL at 08:33

## 2022-07-06 RX ADMIN — Medication 1000 UNITS: at 20:29

## 2022-07-06 RX ADMIN — Medication 10 ML: at 20:29

## 2022-07-06 RX ADMIN — METOPROLOL TARTRATE 25 MG: 25 TABLET, FILM COATED ORAL at 20:29

## 2022-07-06 RX ADMIN — ATORVASTATIN CALCIUM 20 MG: 20 TABLET, FILM COATED ORAL at 08:33

## 2022-07-06 RX ADMIN — HEPARIN SODIUM 5000 UNITS: 5000 INJECTION INTRAVENOUS; SUBCUTANEOUS at 08:36

## 2022-07-06 RX ADMIN — HEPARIN SODIUM 5000 UNITS: 5000 INJECTION INTRAVENOUS; SUBCUTANEOUS at 20:28

## 2022-07-06 RX ADMIN — ASPIRIN 81 MG: 81 TABLET, COATED ORAL at 08:33

## 2022-07-06 NOTE — PROGRESS NOTES
[facility] Medicine Services  PROGRESS NOTE    Patient Name: Elizabeth Caceres  : 1940  MRN: [id number]    Date of Admission: 2022  Primary Care Physician: Eric Wei MD    Subjective   Subjective     CC:  Exertional dyspnea    HPI:  Feeling better after diuresis from ED. No current dyspnea at rest    ROS:  Gen- No fevers, chills  CV- No chest pain, palpitations  Resp- No cough, dyspnea as above  GI- No N/V/D, abd pain         Objective   Objective     Vital Signs:   Temp:  [97 °F (36.1 °C)-98.5 °F (36.9 °C)] 97 °F (36.1 °C)  Heart Rate:  [] 74  Resp:  [18-24] 18  BP: ()/(61-82) 122/73     Physical Exam:  Constitutional: No acute distress, awake, alert, pleasant  HENT: NCAT, mucous membranes moist  Respiratory: Clear to auscultation bilaterally, respiratory effort normal   Cardiovascular: RRR, holosystolic murmur, rubs, or gallops  Gastrointestinal: Positive bowel sounds, soft, nontender, nondistended  Musculoskeletal: No bilateral ankle edema  Psychiatric: Appropriate affect, cooperative  Neurologic: Oriented x 3, strength symmetric in all extremities, Cranial Nerves grossly intact to confrontation, speech clear  Skin: No rashes      Results Reviewed:  LAB RESULTS:      Lab 22  0817 22  1455   WBC 5.84 6.85   HEMOGLOBIN 9.3* 8.7*   HEMATOCRIT 30.7* 28.8*   PLATELETS 200 201   NEUTROS ABS 3.94 4.99   IMMATURE GRANS (ABS) 0.02 0.03   LYMPHS ABS 1.08 1.07   MONOS ABS 0.55 0.61   EOS ABS 0.21 0.12   MCV 94.2 92.0         Lab 22  0817 22  1455   SODIUM 142 143   POTASSIUM 4.4 4.1   CHLORIDE 106 108*   CO2 29.0 25.0   ANION GAP 7.0 10.0   BUN 29* 25*   CREATININE 0.90 1.01*   EGFR 64.0 55.7*   GLUCOSE 94 96   CALCIUM 9.5 8.9         Lab 22  1455   TOTAL PROTEIN 6.7   ALBUMIN 4.10   GLOBULIN 2.6   ALT (SGPT) 52*   AST (SGOT) 42*   BILIRUBIN 0.7   ALK PHOS 115         Lab 22  1704 22  1455   PROBNP  --  5,358.0*   TROPONIN  T <0.010 <0.010             Lab 07/05/22  1704 07/05/22  1455   IRON 24*  --    IRON SATURATION 6*  --    TIBC 428  --    TRANSFERRIN 287  --    FERRITIN 25.41  --    FOLATE  --  15.50   VITAMIN B 12  --  >2,000*         Brief Urine Lab Results  (Last result in the past 365 days)      Color   Clarity   Blood   Leuk Est   Nitrite   Protein   CREAT   Urine HCG        04/16/22 0346 Yellow   Clear   Negative   Negative   Negative   Trace                 Microbiology Results Abnormal     None          XR Chest 1 View    Result Date: 7/5/2022  DATE OF EXAM: 7/5/2022 1:58 PM  PROCEDURE: XR CHEST 1 VW-  INDICATIONS: SOA triage protocol  COMPARISON: 11/01/2013  TECHNIQUE: Single radiographic AP view of the chest was obtained.  FINDINGS: Status post aortic valve repair. Cardiomegaly. Prominence of the upper lobe pulmonary vessels. Small bilateral pleural effusions. Left greater than right basilar airspace disease      Impression: Cardiomegaly with pulmonary venous hypertension and small bilateral pleural effusions. Bibasilar airspace disease likely represents atelectasis.  This report was finalized on 7/5/2022 2:10 PM by Nico Gabriel.        Results for orders placed during the hospital encounter of 05/19/22    Adult Transesophageal Echo (AXEL) W/ Cont if Necessary Per Protocol    Interpretation Summary  · Moderate biatrial enlargement.  · Left ventricular wall thickness is consistent with mild to moderate concentric hypertrophy.  · Normal left ventricular systolic function, estimated EF 60%.  · There is a bioprosthetic aortic valve present with restricted leaflet mobility and severe aortic stenosis. Mean gradient is 56.7 mmHg. STJ 2.6 cm.  · Mild mitral regurgitation.  · Mild tricuspid regurgitation.      I have reviewed the medications:  Scheduled Meds:aspirin, 81 mg, Oral, Daily  atorvastatin, 20 mg, Oral, Daily  cholecalciferol, 1,000 Units, Oral, Nightly  heparin (porcine), 5,000 Units, Subcutaneous, Q12H  losartan, 50  mg, Oral, Daily  metoprolol tartrate, 25 mg, Oral, BID  pharmacy consult - MTM, , Does not apply, Daily  sodium chloride, 10 mL, Intravenous, Q12H  vitamin B-12, 1,000 mcg, Oral, Nightly      Continuous Infusions:   PRN Meds:.•  acetaminophen **OR** acetaminophen **OR** acetaminophen  •  senna-docusate sodium **AND** polyethylene glycol **AND** bisacodyl **AND** bisacodyl  •  ipratropium-albuterol  •  ondansetron **OR** ondansetron  •  sodium chloride  •  sodium chloride    Assessment & Plan   Assessment & Plan     Active Hospital Problems    Diagnosis  POA   • Congestive heart failure due to valvular disease (HCC) [I50.9, I38]  Yes   • Hyperlipidemia [E78.5]  Yes   • COPD (chronic obstructive pulmonary disease) (HCC) [J44.9]  Yes   • HTN (hypertension) [I10]  Yes      Resolved Hospital Problems   No resolved problems to display.        Brief Hospital Course to date:  Elizabeth Caceres is a 82 y.o. female with PMH of CHF, COPD, HLD, HTN, Aortic valve replacement presenting w increased exertional dyspnea over the past few weeks. Was scheduled for a TAVR in August    CHF due to valvular diease  Severe Aortic stenosis  -- lasix IV given in ED will defer further diuresis to cardiology  -- CXR cardiomegaly with pulm HTN and small luis pleural effusions   -- consult Dr. Osman  -CTS consulted, plans for TAVR tmrw w Dr Wade  -- ECHO pending   -- cont ASA, statin, cozaar and lopressor   -- oxygen as needed      Elevated LFT's  -- labs in am   -- cont statin for now unless continues to worsen      HTN  HLD  -- cont home meds      Anemia  -- FOBT neg  -- check iron profile, folate and B12.   -- cont B12 replacement       COPD  -- nebs prn        Expected Discharge Location and Transportation: TBD after TAVR   Expected Discharge Date: TBD after TAVR    DVT prophylaxis:  Medical DVT prophylaxis orders are present.          CODE STATUS:   Code Status and Medical Interventions:   Ordered at: 07/05/22 6385     Level Of Support  Discussed With:    Patient     Code Status (Patient has no pulse and is not breathing):    CPR (Attempt to Resuscitate)     Medical Interventions (Patient has pulse or is breathing):    Full Support       Kelly Mcghee MD  07/06/22

## 2022-07-06 NOTE — CONSULTS
[facility] Cardiothoracic Surgery In-Patient Consult    Name:  Elizabeth Caceres  MRN Number:  [id number]  Date of Admission:  7/5/2022  Date of Consultation: 7/6/2022    Consulting Provider:    PCP: Eric Wei MD  IP Care Team:  Patient Care Team:  Eric Wei MD as PCP - General  Eric Wei MD as PCP - Family Medicine    Reason for Consultation: Severe aortic stenosis    History of Present Illness:    Elizabeth Caceres is a 82 y.o. female with a history of congestive heart failure, hyperlipidemia, hypertension, MRSA, COPD, and severe aortic stenosis s/p aortic valve replacement w/ Dr. Sebastian in 2013. She presented to [facility] ER on 7/5 for worsening dyspnea on exertion. She has been receiving workup for possible TAVR. AXEL done on 5/19/22 revealed severe aortic stenosis with a mean pressure gradient of 56.7 mmHg and peak velocity of 511.4 cm/s, EF 60%, mild mitral and tricuspid regurgitation.   She reports worsening shortness of breath over the past 2 to 3 weeks with orthopnea, generalized weakness, and activity intolerance. She denies any history of rheumatic fever, chest pain, leg swelling, dizziness, syncopal episodes, or lightheadedness. Service was consulted for consideration of TAVR.     Review of Systems:  Review of Systems   Constitutional: Positive for activity change and fatigue.   HENT: Negative.    Respiratory: Positive for shortness of breath.    Cardiovascular: Negative.    Gastrointestinal: Negative.    Endocrine: Negative.    Genitourinary: Negative.    Musculoskeletal: Negative.    Skin: Negative.    Allergic/Immunologic: Negative.    Neurological: Positive for weakness.   Hematological: Negative.    Psychiatric/Behavioral: Negative.        Past Medical History:    Past Medical History:   Diagnosis Date   • Arthritis    • Congestive heart failure due to valvular disease (HCC) 7/5/2022   • COPD (chronic obstructive pulmonary disease) (HCC)    •  "Hyperlipidemia    • Hypertension    • MRSA (methicillin resistant Staphylococcus aureus)     History of MRSA with I&D of lower extremity   • Urinary frequency        Past Surgical History:    Past Surgical History:   Procedure Laterality Date   • AORTIC VALVE REPAIR/REPLACEMENT  10/2013    Dr. Sebastian   • APPENDECTOMY N/A 2019    Procedure: APPENDECTOMY LAPAROSCOPIC;  Surgeon: Peter Vargas MD;  Location:  [facility] OR;  Service: General   • CARDIAC CATHETERIZATION Left 2022    Procedure: Left Heart Cath;  Surgeon: Carlton Osman MD;  Location:  [facility];  Service: Cardiology;  Laterality: Left;  SAME DAY AS AXEL   • CARDIAC SURGERY     • COLONOSCOPY     • HIP ARTHROPLASTY Left    • TOTAL LAPAROSCOPIC HYSTERECTOMY SALPINGO OOPHORECTOMY Right 2019    Procedure: LAPAROSCOPIC SALPINGOOPHORECTOMY;  Surgeon: Peter Vargas MD;  Location:  [facility] OR;  Service: General       Family History:    Family History   Problem Relation Age of Onset   • Cancer Father    • Heart disease Brother        Social History:    Social History     Socioeconomic History   • Marital status:    Tobacco Use   • Smoking status: Former Smoker     Quit date:      Years since quittin.5   • Smokeless tobacco: Never Used   Vaping Use   • Vaping Use: Never used   Substance and Sexual Activity   • Alcohol use: No   • Drug use: No   • Sexual activity: Defer       Allergies:  Allergies   Allergen Reactions   • Carvedilol Nausea Only     \"INTOLERANCE\"         Physical Exam:  Vital Signs:    Temp:  [97 °F (36.1 °C)-98.5 °F (36.9 °C)] 97 °F (36.1 °C)  Heart Rate:  [] 74  Resp:  [18-24] 18  BP: ()/(61-82) 122/73  Body mass index is 28.52 kg/m².     Physical Exam  Vitals and nursing note reviewed.   Constitutional:       Appearance: Normal appearance.   HENT:      Head: Normocephalic.      Mouth/Throat:      Pharynx: Oropharynx is clear.   Eyes:      Pupils: Pupils are equal, round, and reactive to " light.   Cardiovascular:      Rate and Rhythm: Normal rate.      Pulses: Normal pulses.      Heart sounds: Murmur heard.    Systolic murmur is present with a grade of 3/6.  Pulmonary:      Effort: Pulmonary effort is normal.      Breath sounds: Normal breath sounds.   Abdominal:      General: Bowel sounds are normal.   Musculoskeletal:         General: Normal range of motion.      Cervical back: Normal range of motion.   Skin:     General: Skin is warm and dry.   Neurological:      General: No focal deficit present.      Mental Status: She is alert and oriented to person, place, and time.   Psychiatric:         Mood and Affect: Mood normal.         Behavior: Behavior normal.         Labs/Imaging/Procedures:   Results from last 7 days   Lab Units 07/05/22  1455   SODIUM mmol/L 143   POTASSIUM mmol/L 4.1   CHLORIDE mmol/L 108*   CO2 mmol/L 25.0   BUN mg/dL 25*   CREATININE mg/dL 1.01*   CALCIUM mg/dL 8.9   BILIRUBIN mg/dL 0.7   ALK PHOS U/L 115   ALT (SGPT) U/L 52*   AST (SGOT) U/L 42*   GLUCOSE mg/dL 96     Results from last 7 days   Lab Units 07/05/22  1704 07/05/22  1455   TROPONIN T ng/mL <0.010 <0.010     Results from last 7 days   Lab Units 07/06/22  0817 07/05/22  1455   WBC 10*3/mm3 5.84 6.85   HEMOGLOBIN g/dL 9.3* 8.7*   HEMATOCRIT % 30.7* 28.8*   PLATELETS 10*3/mm3 200 201                 XR Chest 1 View    Result Date: 7/5/2022  Cardiomegaly with pulmonary venous hypertension and small bilateral pleural effusions. Bibasilar airspace disease likely represents atelectasis.  This report was finalized on 7/5/2022 2:10 PM by Nico Gabriel.       [facility]: Results for orders placed during the hospital encounter of 05/19/22    Cardiac Catheterization/Vascular Study    Narrative  FINAL    Impression  · Angiographically near normal coronary arteries.    RECOMMENDATIONS:  · Proceed to further evaluation and scheduling for TAVR.    Indications: Critical aortic stenosis of the bioprosthetic valve.  Coronary angiography  performed to assess ischemic burden before proceeding to valve in valve TAVR.    Access: Right femoral.    Procedures:  · Selective coronary angiography.  · Arterial site hemostasis with Angio-Seal.    Procedure narrative:  The patient was brought to the catheterization lab in a fasting condition.  Access site was prepped and draped in standard sterile fashion.  Lidocaine was injected and arterial access was obtained by percutaneous anterior wall puncture technique.  A 6 [place] arterial sheath was placed. Above procedures were performed without complications.  At the conclusion the arterial sheath was removed and hemostasis was achieved.  The patient was transferred to the unit in a stable condition.  Initially left radial arterial access was attempted.  Despite excellent blood return wire could not be advanced, tortuosity versus plaque was suspected.  Similar problem was encountered with the right radial attempted access therefore the procedure was completed via the right femoral approach.    Angiographic Findings:  Left coronary dominance.  · LM: Angiographically normal.  · LAD: Minor irregularities, no significant disease.  · LCX: Dominant vessel with minor irregularities, no significant disease.  · RCA: Angiographically normal nondominant vessel.    Complications: No acute procedure related complications.     Echo: Results for orders placed during the hospital encounter of 05/19/22    Adult Transesophageal Echo (AXEL) W/ Cont if Necessary Per Protocol    Interpretation Summary  · Moderate biatrial enlargement.  · Left ventricular wall thickness is consistent with mild to moderate concentric hypertrophy.  · Normal left ventricular systolic function, estimated EF 60%.  · There is a bioprosthetic aortic valve present with restricted leaflet mobility and severe aortic stenosis. Mean gradient is 56.7 mmHg. STJ 2.6 cm.  · Mild mitral regurgitation.  · Mild tricuspid regurgitation.     Carotid Duplex: Results for orders  placed during the hospital encounter of 05/19/22    Duplex Carotid Ultrasound CAR    Interpretation Summary  · No evidence of hemodynamically significant stenosis of bilateral carotid arteries.  · Intimal thickening and mild scattered plaque is noted.    Assessment:    HTN (hypertension)    Hyperlipidemia    COPD (chronic obstructive pulmonary disease) (HCC)    Congestive heart failure due to valvular disease (HCC)    Elizabeth Caceres is a 82 y.o. female with a history of congestive heart failure, hyperlipidemia, hypertension, MRSA, COPD, and severe aortic stenosis s/p aortic valve replacement w/ Dr. Sebastian in 2013. She presented to [facility] ER on 7/5 for worsening dyspnea on exertion. She has been receiving workup for possible TAVR. AXEL done on 5/19/22 revealed severe aortic stenosis with a mean pressure gradient of 56.7 mmHg and peak velocity of 511.4 cm/s, EF 60%, mild mitral and tricuspid regurgitation.   She reports worsening shortness of breath over the past 2 to 3 weeks with orthopnea, generalized weakness, and activity intolerance. She denies any history of rheumatic fever, chest pain, leg swelling, dizziness, syncopal episodes, or lightheadedness. Service was consulted for consideration of TAVR.     Plan:  -Preop for TAVR tomorrow with Dr. Wade  -N.p.izzy after midnight        VALDEZ Ruiz  10:00 EDT  07/06/22     Thank you for allowing us to participate in the care of your patient. Please do not hesitate to contact us with additional questions or concerns.

## 2022-07-06 NOTE — PLAN OF CARE
Goal Outcome Evaluation:  Plan of Care Reviewed With: patient        Progress: no change  Outcome Evaluation: Patient admitted with SOB, patient has no c/o SOB this shift, maintained O2 saturation above 90% on room air, patient's VSS throughout shift, will continue to monitor TGS RN

## 2022-07-06 NOTE — CONSULTS
Date of Hospital Visit: 22  Encounter Provider: Silvana Boyle PA-C  Place of Service: [facility]  Patient Name: Elizabeth Caceres  :1940  Referral Provider: Darrick Cantu*  Primary Care Provider: Eric Wei MD    Chief complaint/Reason for Consultation: CHF exacerbation/aortic stenosis    Problem List:  1. Valvular heart disease:  a. Echocardiogram, 10/2013: Critical AS with SHARA 0.7 cm2 and mean gradient of 45 mmHg.   b. Abnormal Cardiolite stress test, 2013.    c. [facility], 10/23/2013, Dr. Osman: Critical AS with gradient of 101 mm across the aortic valve.  EF 70%. No significant coronary disease.    d. AVR with bioprosthetic valve, 10/2013, Dr. Sebastian.   e. Normal MPS with no evidence of ischemia. EF 80%.  f. Echocardiogram, 10/03/2017: EF 66-70%. LV diastolic dysfunction (grade I) consistent with impaired relaxation. Bioprosthetic AV. Calcification of the aortic valve mainly affecting the non and right coronary cusp(s). Mild to moderate AS. Peak gradient of 35 and a mean gradient of 20 mm Hg. SHARA 1.0cm2, probably an underestimation. Myxomatous changes of the mitral valve apparatus. Mild MR. No evidence of pericardial effusion.  g. Echocardiogram, 10/11/2018: EF > 70% with mild concentric LVH. LV diastolic dysfunction (grade I). Mild AS, bioprosthetic AV with peak gradient 30 mmHg, mean 17. No AI. Mild TR, RVSP 37 mmHg. No evidence of pericardial effusion.  h. Echocardiogram, 2021: EF 61-65%. Moderate LVH. Grade I diastolic dysfunction. Bioprosthetic AV present. Moderate to severe AS, Ao max PG 68 mmHg, Ao mean PG 36 mmHg, SHARA (I,D) 1.16 cm^2. RVSP from TR 35-45 mmHg. Mild PHTN.   i. Echocardiogram, 2022: EF 61-65%. Severe AS, max .4 mmHg, mean PG 65.3 mmHg, Ao V2 .3 cm, SHARA(I,D) 0.51 cm2. Moderate concentric LVH. Grade I diastolic dysfunction. Mild MR and TR. Moderate PHTN (50 mmHg).   j. Echo 2022: Moderate biatrial enlargement,  mild to moderate concentric hypertrophy, EF 60%.  There is a bioprosthetic aortic valve present with restricted leaflet mobility and severe aortic stenosis with a mean gradient of 56.7 mmHg and STJ 2.6 cm  k. LHC 5/19/2022: Angiographically near normal coronaries  l. Carotid duplex 5/19/2022: No evidence of hemodynamically significant stenosis of bilateral carotid arteries, intimal thickening and mild scattered plaque noted  2. Hypertension.   3. Dyslipidemia.    4. COPD  5. History of presyncope.   6. History of MRSA with I&D of lower extremity, 2007.   7. Vitamin D and Vitamin B12 deficiency.         History of Present Illness:  Patient is an 82-year-old female with past medical history significant for severe aortic valve stenosis status post bioprosthetic aortic valve replacement in 2013 with Dr. Crawford tentatively scheduled for TAVR 8/8, hypertension, dyslipidemia, COPD who we are seeing in consultation today with complaints of worsening shortness of breath and dyspnea on exertion x2 to 3 weeks.     The patients family did call Sue Escalante 6/6 with complaints of low BP and high heart rates with near syncope. She was advised to go to the nearest ER if she was unstable or to follow up in the clinic if the patient was stable. Since then, she has had worsening shortness of breath. She notes that yesterday when she was taking a shower she had to sit down before she could dry off. This prompted her ER visit. She was given IV Lasix in the ED yesterday and notes that her breathing has substantially improved. She denies any chest pain, palpitations, edema, and syncope.    Past Medical History:   Diagnosis Date   • Arthritis    • Congestive heart failure due to valvular disease (HCC) 7/5/2022   • COPD (chronic obstructive pulmonary disease) (HCC)    • Hyperlipidemia    • Hypertension    • MRSA (methicillin resistant Staphylococcus aureus) 2007    History of MRSA with I&D of lower extremity   • Urinary frequency         Past Surgical History:   Procedure Laterality Date   • AORTIC VALVE REPAIR/REPLACEMENT  10/2013    Dr. Sebastian   • APPENDECTOMY N/A 2019    Procedure: APPENDECTOMY LAPAROSCOPIC;  Surgeon: Peter Vargas MD;  Location:  [facility] OR;  Service: General   • CARDIAC CATHETERIZATION Left 2022    Procedure: Left Heart Cath;  Surgeon: Carlton Osman MD;  Location:  [facility];  Service: Cardiology;  Laterality: Left;  SAME DAY AS AXEL   • CARDIAC SURGERY     • COLONOSCOPY     • HIP ARTHROPLASTY Left    • TOTAL LAPAROSCOPIC HYSTERECTOMY SALPINGO OOPHORECTOMY Right 2019    Procedure: LAPAROSCOPIC SALPINGOOPHORECTOMY;  Surgeon: Peter Vargas MD;  Location:  [facility] OR;  Service: General       Medications Prior to Admission   Medication Sig Dispense Refill Last Dose   • aspirin 81 MG EC tablet Take 81 mg by mouth Daily.   2022 at Unknown time   • atorvastatin (LIPITOR) 20 MG tablet Take 20 mg by mouth Daily.   2022 at Unknown time   • cholecalciferol (VITAMIN D3) 1000 units tablet Take 1,000 Units by mouth Every Night.   2022 at Unknown time   • losartan (COZAAR) 50 MG tablet Take 1 tablet by mouth Daily. 90 tablet 3 2022 at Unknown time   • metoprolol tartrate (LOPRESSOR) 25 MG tablet Take 25 mg by mouth 2 (Two) Times a Day.   2022 at Unknown time   • vitamin B-12 (CYANOCOBALAMIN) 1000 MCG tablet Take 1,000 mcg by mouth Every Night.   2022 at Unknown time   • vitamin C (ASCORBIC ACID) 500 MG tablet Take 500 mg by mouth Every Night.   Unknown at Unknown time       Social History     Socioeconomic History   • Marital status:    Tobacco Use   • Smoking status: Former Smoker     Quit date:      Years since quittin.5   • Smokeless tobacco: Never Used   Vaping Use   • Vaping Use: Never used   Substance and Sexual Activity   • Alcohol use: No   • Drug use: No   • Sexual activity: Defer       Family History   Problem Relation Age of Onset   • Cancer Father    •  "Heart disease Brother        REVIEW OF SYSTEMS:     12 point ROS was performed and is Negative except as outlined in HPI     Objective:     Vitals:    07/05/22 2127 07/05/22 2329 07/06/22 0317 07/06/22 0828   BP: 129/82 112/65 98/61 122/73   BP Location: Right arm Left arm Right arm Left arm   Patient Position: Lying Lying Lying Lying   Pulse: 91 70 68 74   Resp: 20 20 20 18   Temp:  98.3 °F (36.8 °C) 98.3 °F (36.8 °C) 97 °F (36.1 °C)   TempSrc:  Oral Oral Oral   SpO2: 91% 91% 90% 92%   Weight:       Height:         Body mass index is 28.52 kg/m².  Flowsheet Rows    Flowsheet Row First Filed Value   Admission Height 160 cm (63\") Documented at 07/05/2022 1311   Admission Weight 70.8 kg (156 lb) Documented at 07/05/2022 1311          Physical Exam   General: No acute distress    Skin: Skin is warm and dry. No obvious cyanosis, erythema or pallor.   HEENT: Atraumatic, normocephalic, no conjunctival pallor, no scleral icterus.   Neck:  no JVD   Chest: No respiratory distress. No chest wall tenderness. Breath sounds are normal.  Cardiovascular: Normal S1 and S2, no murmur, gallop or rub. PMI is not displaced.    Pulses:Radial and pedal pulses are 2+ and symmetric.    Abdomen: Soft, nontender, normal bowel sounds.   Musculoskeletal/Extremities:  No clubbing, cyanosis or edema. No gross deformity.   Neurological: Alert and oriented to person, place, and time, no gross focal deficits.   Psychiatric: Normal mood and affect.Speech and behavior is normal.    Lab Review:                Results from last 7 days   Lab Units 07/05/22  1455   SODIUM mmol/L 143   POTASSIUM mmol/L 4.1   CHLORIDE mmol/L 108*   CO2 mmol/L 25.0   BUN mg/dL 25*   CREATININE mg/dL 1.01*   GLUCOSE mg/dL 96   CALCIUM mg/dL 8.9     Results from last 7 days   Lab Units 07/05/22  1704 07/05/22  1455   TROPONIN T ng/mL <0.010 <0.010     Results from last 7 days   Lab Units 07/05/22  1455   WBC 10*3/mm3 6.85   HEMOGLOBIN g/dL 8.7*   HEMATOCRIT % 28.8* "   PLATELETS 10*3/mm3 201                 proBNP: 5358  Imaging Results (Last 24 Hours)     Procedure Component Value Units Date/Time    XR Chest 1 View [[id number]] Collected: 07/05/22 1408     Updated: 07/05/22 1413    Narrative:      DATE OF EXAM: 7/5/2022 1:58 PM     PROCEDURE: XR CHEST 1 VW-     INDICATIONS: SOA triage protocol     COMPARISON: 11/01/2013     TECHNIQUE: Single radiographic AP view of the chest was obtained.     FINDINGS:  Status post aortic valve repair. Cardiomegaly. Prominence of the upper  lobe pulmonary vessels. Small bilateral pleural effusions. Left greater  than right basilar airspace disease        Impression:      Cardiomegaly with pulmonary venous hypertension and small bilateral  pleural effusions. Bibasilar airspace disease likely represents  atelectasis.     This report was finalized on 7/5/2022 2:10 PM by Nico Gabriel.            EKG: Normal sinus rhythm.  No acute ST-T changes    Assessment:   1. Aortic stenosis, severe  1. Echo 5/19/22: bioprosthetic aortic valve present with severe AS with a mean gradient of 56.7 mmHg and STJ 2.6 cm  2. LHC and carotid duplex with nonobstructive plaque  3. Tentatively scheduled for TAVR 8/8  2. CHF secondary to valvular heart disease  1. Patient given IV Lasix and responding to diuresis well. Shortness of breath has improved  3. Bilateral pleural effusion  4. Hypertension, controlled  5. Dyslipidemia, controlled  6. Anemia    Plan:   1. Agree with Lasix 40mg IV x 2 doses today for diuresis. Hold tomorrow am in preparation for procedure. Strict Is/Os. Monitor renal function.  2. Continue all other cardiac medications at current dose.   3. We have spoken with Dr. Wade and will plan for TAVR tomorrow. NPO after midnight.   4. Pre-Op workup today per CT surgery. Management of anemia per hospitalist.  5. Current medical therapy reviewed and agree, continue same management.  6. Thank you for the consult, we will follow.     Scribed for Carlton Osman  MD by Silvana Boyle PA-C. 7/6/2022  08:46 EDT     I, Carlton Osman MD, personally performed the services described in this documentation as scribed by the above named individual in my presence, and it is both accurate and complete.  7/6/2022  14:39 EDT

## 2022-07-06 NOTE — ANESTHESIA PREPROCEDURE EVALUATION
Anesthesia Evaluation     Patient summary reviewed and Nursing notes reviewed   no history of anesthetic complications:  NPO Solid Status: > 8 hours  NPO Liquid Status: > 2 hours           Airway   Mallampati: II  TM distance: >3 FB  Neck ROM: full  Dental - normal exam     Pulmonary - normal exam   (+) COPD,   Cardiovascular   Exercise tolerance: good (4-7 METS)    ECG reviewed  Patient on routine beta blocker and Beta blocker given within 24 hours of surgery  Rhythm: regular  Rate: normal    (+) hypertension, valvular problems/murmurs AS, CHF , murmur, hyperlipidemia,     ROS comment: 7/5/22-lvef 55, mild cLVH, severe AS of bioprosthesis mean 62mmhg ashley 0.43, mild ai, mod mr, mild to mod tr    05/22- nl lvef, mild rv dilation, severe as peak 5.1 m/s mean pressure 57mmhg bAVR, mild MR, mild tr,     Neuro/Psych  (-) seizures, TIA  GI/Hepatic/Renal/Endo    (-) GERD, liver disease, no renal disease, diabetes, no thyroid disorder    Musculoskeletal     Abdominal    Substance History      OB/GYN          Other        ROS/Med Hx Other: [id number]  Hg b 9.3  k 4.4    No h/o pain or difficulty swallowing. No h/o esophageal nor gastric pathology nor procedures.                  Anesthesia Plan    ASA 4     general and Claire     (Risks of AXEL discussed with patient (Op/dental damage, dysphagia, perforation,etc); questions answered, agreeable to proceed.  Risks and benefits of general anesthesia discussed with patient (including MI, CVA, death, recall, aspiration, oropharyngeal/dental damage), questions answered, agreeable to proceed.  )  intravenous induction     Anesthetic plan, risks, benefits, and alternatives have been provided, discussed and informed consent has been obtained with: patient.  Use of blood products discussed with patient  Consented to blood products.   Plan discussed with CRNA.        CODE STATUS:    Level Of Support Discussed With: Patient  Code Status (Patient has no pulse and is not breathing):  CPR (Attempt to Resuscitate)  Medical Interventions (Patient has pulse or is breathing): Full Support

## 2022-07-06 NOTE — CASE MANAGEMENT/SOCIAL WORK
Discharge Planning Assessment  [facility]     Patient Name: Elizabeth Caceres  MRN: [id number]  Today's Date: 7/6/2022    Admit Date: 7/5/2022     Discharge Needs Assessment     Row Name 07/06/22 1019       Living Environment    People in Home spouse;grandchild(sandy)    Name(s) of People in Home spouse is radha Wells great grandchild    Current Living Arrangements other (see comments)  mobile home    Primary Care Provided by self    Provides Primary Care For grandchild(sandy)    Family Caregiver if Needed spouse    Quality of Family Relationships unable to assess    Able to Return to Prior Arrangements yes       Resource/Environmental Concerns    Resource/Environmental Concerns none       Transition Planning    Patient/Family Anticipates Transition to home with family    Patient/Family Anticipated Services at Transition none    Transportation Anticipated family or friend will provide       Discharge Needs Assessment    Readmission Within the Last 30 Days no previous admission in last 30 days    Equipment Currently Used at Home cane, quad tip;walker, rolling;wheelchair    Concerns to be Addressed no discharge needs identified    Anticipated Changes Related to Illness none    Equipment Needed After Discharge none               Discharge Plan     Row Name 07/06/22 1021       Plan    Plan Home    Plan Comments I spoke with patient in regards to discharge planning. She tells me she is independent with ADL's, continues to drive, using no rehab services.    will follow her care here and provided discharge planning assistance, if needed.    Final Discharge Disposition Code 01 - home or self-care              Continued Care and Services - Admitted Since 7/5/2022    Coordination has not been started for this encounter.          Demographic Summary     Row Name 07/06/22 1018       General Information    Admission Type observation    Referral Source admission list    Preferred Language English    General Information  Comments PCP is Eric Wei               Functional Status     Row Name 07/06/22 1019       Functional Status    Usual Activity Tolerance excellent       Functional Status, IADL    Medications independent    Meal Preparation independent    Housekeeping independent    Laundry independent    Shopping independent       Employment/    Employment/ Comments Patient has [employer] Medicare insurance and denies concerns regarding coverage or disruption in coverage issues. Patient has drug coverage and denies issues obtaining or affording current medications.    She does not have advanced directives .               Psychosocial    No documentation.                Abuse/Neglect    No documentation.                Legal    No documentation.                Substance Abuse    No documentation.                Patient Forms    No documentation.                   Laisha Lambert RN

## 2022-07-07 ENCOUNTER — ANESTHESIA (OUTPATIENT)
Dept: PERIOP | Facility: HOSPITAL | Age: 82
End: 2022-07-07

## 2022-07-07 ENCOUNTER — ANCILLARY PROCEDURE (OUTPATIENT)
Dept: PERIOP | Facility: HOSPITAL | Age: 82
End: 2022-07-07

## 2022-07-07 ENCOUNTER — ANESTHESIA EVENT CONVERTED (OUTPATIENT)
Dept: ANESTHESIOLOGY | Facility: HOSPITAL | Age: 82
End: 2022-07-07

## 2022-07-07 ENCOUNTER — DOCUMENTATION (OUTPATIENT)
Dept: CARDIOLOGY | Facility: HOSPITAL | Age: 82
End: 2022-07-07

## 2022-07-07 ENCOUNTER — APPOINTMENT (OUTPATIENT)
Dept: GENERAL RADIOLOGY | Facility: HOSPITAL | Age: 82
End: 2022-07-07

## 2022-07-07 PROBLEM — J44.9 COPD (CHRONIC OBSTRUCTIVE PULMONARY DISEASE) (HCC): Chronic | Status: ACTIVE | Noted: 2019-11-26

## 2022-07-07 PROBLEM — Z87.891 FORMER TOBACCO USE: Status: ACTIVE | Noted: 2022-07-07

## 2022-07-07 PROBLEM — I38 CONGESTIVE HEART FAILURE DUE TO VALVULAR DISEASE: Chronic | Status: ACTIVE | Noted: 2022-07-05

## 2022-07-07 PROBLEM — E78.5 HYPERLIPIDEMIA: Chronic | Status: ACTIVE | Noted: 2019-11-26

## 2022-07-07 PROBLEM — I50.9 CONGESTIVE HEART FAILURE DUE TO VALVULAR DISEASE: Chronic | Status: ACTIVE | Noted: 2022-07-05

## 2022-07-07 LAB
ACT BLD: 126 SECONDS (ref 82–152)
AMPHET+METHAMPHET UR QL: NEGATIVE
AMPHETAMINES UR QL: NEGATIVE
ANION GAP SERPL CALCULATED.3IONS-SCNC: 10 MMOL/L (ref 5–15)
APTT PPP: 29.4 SECONDS (ref 22–39)
BACTERIA UR QL AUTO: ABNORMAL /HPF
BARBITURATES UR QL SCN: NEGATIVE
BENZODIAZ UR QL SCN: NEGATIVE
BILIRUB UR QL STRIP: NEGATIVE
BUN SERPL-MCNC: 24 MG/DL (ref 8–23)
BUN/CREAT SERPL: 32.9 (ref 7–25)
BUPRENORPHINE SERPL-MCNC: NEGATIVE NG/ML
CALCIUM SPEC-SCNC: 8.2 MG/DL (ref 8.6–10.5)
CANNABINOIDS SERPL QL: NEGATIVE
CHLORIDE SERPL-SCNC: 108 MMOL/L (ref 98–107)
CLARITY UR: CLEAR
CO2 SERPL-SCNC: 23 MMOL/L (ref 22–29)
COCAINE UR QL: NEGATIVE
COLOR UR: YELLOW
CREAT SERPL-MCNC: 0.73 MG/DL (ref 0.57–1)
DEPRECATED RDW RBC AUTO: 47.6 FL (ref 37–54)
EGFRCR SERPLBLD CKD-EPI 2021: 82.2 ML/MIN/1.73
ERYTHROCYTE [DISTWIDTH] IN BLOOD BY AUTOMATED COUNT: 14.3 % (ref 12.3–15.4)
GLUCOSE BLDC GLUCOMTR-MCNC: 151 MG/DL (ref 70–130)
GLUCOSE BLDC GLUCOMTR-MCNC: 88 MG/DL (ref 70–130)
GLUCOSE SERPL-MCNC: 187 MG/DL (ref 65–99)
GLUCOSE UR STRIP-MCNC: NEGATIVE MG/DL
HCT VFR BLD AUTO: 29.3 % (ref 34–46.6)
HGB BLD-MCNC: 9.1 G/DL (ref 12–15.9)
HGB UR QL STRIP.AUTO: NEGATIVE
HYALINE CASTS UR QL AUTO: ABNORMAL /LPF
KETONES UR QL STRIP: NEGATIVE
LEUKOCYTE ESTERASE UR QL STRIP.AUTO: ABNORMAL
MCH RBC QN AUTO: 29.7 PG (ref 26.6–33)
MCHC RBC AUTO-ENTMCNC: 31.1 G/DL (ref 31.5–35.7)
MCV RBC AUTO: 95.8 FL (ref 79–97)
METHADONE UR QL SCN: NEGATIVE
NITRITE UR QL STRIP: POSITIVE
OPIATES UR QL: NEGATIVE
OXYCODONE UR QL SCN: NEGATIVE
PCP UR QL SCN: NEGATIVE
PH UR STRIP.AUTO: 6 [PH] (ref 5–8)
PLATELET # BLD AUTO: 212 10*3/MM3 (ref 140–450)
PMV BLD AUTO: 9.9 FL (ref 6–12)
POTASSIUM SERPL-SCNC: 4.1 MMOL/L (ref 3.5–5.2)
PROPOXYPH UR QL: NEGATIVE
PROT UR QL STRIP: NEGATIVE
QT INTERVAL: 392 MS
QT INTERVAL: 424 MS
QTC INTERVAL: 467 MS
QTC INTERVAL: 505 MS
RBC # BLD AUTO: 3.06 10*6/MM3 (ref 3.77–5.28)
RBC # UR STRIP: ABNORMAL /HPF
REF LAB TEST METHOD: ABNORMAL
SODIUM SERPL-SCNC: 141 MMOL/L (ref 136–145)
SP GR UR STRIP: 1.02 (ref 1–1.03)
SQUAMOUS #/AREA URNS HPF: ABNORMAL /HPF
TRICYCLICS UR QL SCN: NEGATIVE
UROBILINOGEN UR QL STRIP: ABNORMAL
WBC # UR STRIP: ABNORMAL /HPF
WBC NRBC COR # BLD: 13.36 10*3/MM3 (ref 3.4–10.8)

## 2022-07-07 PROCEDURE — 85730 THROMBOPLASTIN TIME PARTIAL: CPT | Performed by: THORACIC SURGERY (CARDIOTHORACIC VASCULAR SURGERY)

## 2022-07-07 PROCEDURE — 25010000002 DEXAMETHASONE PER 1 MG: Performed by: NURSE ANESTHETIST, CERTIFIED REGISTERED

## 2022-07-07 PROCEDURE — 94660 CPAP INITIATION&MGMT: CPT

## 2022-07-07 PROCEDURE — 82330 ASSAY OF CALCIUM: CPT

## 2022-07-07 PROCEDURE — C1894 INTRO/SHEATH, NON-LASER: HCPCS | Performed by: THORACIC SURGERY (CARDIOTHORACIC VASCULAR SURGERY)

## 2022-07-07 PROCEDURE — 25010000002 CEFTRIAXONE PER 250 MG: Performed by: INTERNAL MEDICINE

## 2022-07-07 PROCEDURE — 25010000002 PROTAMINE SULFATE PER 10 MG: Performed by: NURSE ANESTHETIST, CERTIFIED REGISTERED

## 2022-07-07 PROCEDURE — 0 CEFUROXIME SODIUM 1.5 G RECONSTITUTED SOLUTION

## 2022-07-07 PROCEDURE — 81001 URINALYSIS AUTO W/SCOPE: CPT

## 2022-07-07 PROCEDURE — 85347 COAGULATION TIME ACTIVATED: CPT

## 2022-07-07 PROCEDURE — C1769 GUIDE WIRE: HCPCS | Performed by: THORACIC SURGERY (CARDIOTHORACIC VASCULAR SURGERY)

## 2022-07-07 PROCEDURE — C1889 IMPLANT/INSERT DEVICE, NOC: HCPCS | Performed by: THORACIC SURGERY (CARDIOTHORACIC VASCULAR SURGERY)

## 2022-07-07 PROCEDURE — 33361 REPLACE AORTIC VALVE PERQ: CPT | Performed by: THORACIC SURGERY (CARDIOTHORACIC VASCULAR SURGERY)

## 2022-07-07 PROCEDURE — 25010000002 ONDANSETRON PER 1 MG: Performed by: NURSE ANESTHETIST, CERTIFIED REGISTERED

## 2022-07-07 PROCEDURE — 82803 BLOOD GASES ANY COMBINATION: CPT

## 2022-07-07 PROCEDURE — 80048 BASIC METABOLIC PNL TOTAL CA: CPT | Performed by: PHYSICIAN ASSISTANT

## 2022-07-07 PROCEDURE — 94799 UNLISTED PULMONARY SVC/PX: CPT

## 2022-07-07 PROCEDURE — 33361 REPLACE AORTIC VALVE PERQ: CPT | Performed by: INTERNAL MEDICINE

## 2022-07-07 PROCEDURE — 93355 ECHO TRANSESOPHAGEAL (TEE): CPT

## 2022-07-07 PROCEDURE — 93005 ELECTROCARDIOGRAM TRACING: CPT | Performed by: ANESTHESIOLOGY

## 2022-07-07 PROCEDURE — 93005 ELECTROCARDIOGRAM TRACING: CPT | Performed by: PHYSICIAN ASSISTANT

## 2022-07-07 PROCEDURE — 25010000002 HEPARIN (PORCINE) PER 1000 UNITS: Performed by: INTERNAL MEDICINE

## 2022-07-07 PROCEDURE — 82947 ASSAY GLUCOSE BLOOD QUANT: CPT

## 2022-07-07 PROCEDURE — 84132 ASSAY OF SERUM POTASSIUM: CPT

## 2022-07-07 PROCEDURE — C1887 CATHETER, GUIDING: HCPCS | Performed by: THORACIC SURGERY (CARDIOTHORACIC VASCULAR SURGERY)

## 2022-07-07 PROCEDURE — B24BZZ4 ULTRASONOGRAPHY OF HEART WITH AORTA, TRANSESOPHAGEAL: ICD-10-PCS | Performed by: NURSE ANESTHETIST, CERTIFIED REGISTERED

## 2022-07-07 PROCEDURE — 85027 COMPLETE CBC AUTOMATED: CPT | Performed by: PHYSICIAN ASSISTANT

## 2022-07-07 PROCEDURE — 93010 ELECTROCARDIOGRAM REPORT: CPT | Performed by: INTERNAL MEDICINE

## 2022-07-07 PROCEDURE — 71045 X-RAY EXAM CHEST 1 VIEW: CPT

## 2022-07-07 PROCEDURE — 80306 DRUG TEST PRSMV INSTRMNT: CPT

## 2022-07-07 PROCEDURE — 85014 HEMATOCRIT: CPT

## 2022-07-07 PROCEDURE — 0 IOPAMIDOL PER 1 ML: Performed by: THORACIC SURGERY (CARDIOTHORACIC VASCULAR SURGERY)

## 2022-07-07 PROCEDURE — 25010000002 FENTANYL CITRATE (PF) 50 MCG/ML SOLUTION: Performed by: NURSE ANESTHETIST, CERTIFIED REGISTERED

## 2022-07-07 PROCEDURE — 25010000002 HEPARIN (PORCINE) PER 1000 UNITS: Performed by: THORACIC SURGERY (CARDIOTHORACIC VASCULAR SURGERY)

## 2022-07-07 PROCEDURE — 02RF38Z REPLACEMENT OF AORTIC VALVE WITH ZOOPLASTIC TISSUE, PERCUTANEOUS APPROACH: ICD-10-PCS | Performed by: THORACIC SURGERY (CARDIOTHORACIC VASCULAR SURGERY)

## 2022-07-07 PROCEDURE — 25010000002 PHENYLEPHRINE 10 MG/ML SOLUTION 1 ML VIAL: Performed by: NURSE ANESTHETIST, CERTIFIED REGISTERED

## 2022-07-07 PROCEDURE — 99233 SBSQ HOSP IP/OBS HIGH 50: CPT | Performed by: INTERNAL MEDICINE

## 2022-07-07 PROCEDURE — B4101ZZ FLUOROSCOPY OF ABDOMINAL AORTA USING LOW OSMOLAR CONTRAST: ICD-10-PCS | Performed by: INTERNAL MEDICINE

## 2022-07-07 PROCEDURE — 84295 ASSAY OF SERUM SODIUM: CPT

## 2022-07-07 PROCEDURE — C1760 CLOSURE DEV, VASC: HCPCS | Performed by: THORACIC SURGERY (CARDIOTHORACIC VASCULAR SURGERY)

## 2022-07-07 PROCEDURE — 25010000002 HEPARIN (PORCINE) PER 1000 UNITS: Performed by: NURSE ANESTHETIST, CERTIFIED REGISTERED

## 2022-07-07 DEVICE — KT VLV AORT TRNSCATH SAPIEN3 W/ULTRA/DS 14F 20MM: Type: IMPLANTABLE DEVICE | Site: HEART | Status: FUNCTIONAL

## 2022-07-07 RX ORDER — FAMOTIDINE 20 MG/1
20 TABLET, FILM COATED ORAL ONCE
Status: COMPLETED | OUTPATIENT
Start: 2022-07-07 | End: 2022-07-07

## 2022-07-07 RX ORDER — ONDANSETRON 2 MG/ML
INJECTION INTRAMUSCULAR; INTRAVENOUS AS NEEDED
Status: DISCONTINUED | OUTPATIENT
Start: 2022-07-07 | End: 2022-07-07 | Stop reason: SURG

## 2022-07-07 RX ORDER — FENTANYL CITRATE 50 UG/ML
50 INJECTION, SOLUTION INTRAMUSCULAR; INTRAVENOUS
Status: DISCONTINUED | OUTPATIENT
Start: 2022-07-07 | End: 2022-07-08 | Stop reason: HOSPADM

## 2022-07-07 RX ORDER — HYDROMORPHONE HYDROCHLORIDE 1 MG/ML
0.5 INJECTION, SOLUTION INTRAMUSCULAR; INTRAVENOUS; SUBCUTANEOUS
Status: DISCONTINUED | OUTPATIENT
Start: 2022-07-07 | End: 2022-07-08 | Stop reason: HOSPADM

## 2022-07-07 RX ORDER — SODIUM CHLORIDE 0.9 % (FLUSH) 0.9 %
10 SYRINGE (ML) INJECTION AS NEEDED
Status: DISCONTINUED | OUTPATIENT
Start: 2022-07-07 | End: 2022-07-07 | Stop reason: HOSPADM

## 2022-07-07 RX ORDER — HEPARIN SODIUM 5000 [USP'U]/ML
5000 INJECTION, SOLUTION INTRAVENOUS; SUBCUTANEOUS EVERY 8 HOURS SCHEDULED
Status: DISCONTINUED | OUTPATIENT
Start: 2022-07-07 | End: 2022-07-08 | Stop reason: HOSPADM

## 2022-07-07 RX ORDER — FENTANYL CITRATE 50 UG/ML
INJECTION, SOLUTION INTRAMUSCULAR; INTRAVENOUS AS NEEDED
Status: DISCONTINUED | OUTPATIENT
Start: 2022-07-07 | End: 2022-07-07 | Stop reason: SURG

## 2022-07-07 RX ORDER — MORPHINE SULFATE 2 MG/ML
2 INJECTION, SOLUTION INTRAMUSCULAR; INTRAVENOUS
Status: DISCONTINUED | OUTPATIENT
Start: 2022-07-07 | End: 2022-07-08 | Stop reason: HOSPADM

## 2022-07-07 RX ORDER — SODIUM CHLORIDE 9 MG/ML
250 INJECTION, SOLUTION INTRAVENOUS ONCE AS NEEDED
Status: DISCONTINUED | OUTPATIENT
Start: 2022-07-07 | End: 2022-07-08 | Stop reason: HOSPADM

## 2022-07-07 RX ORDER — LIDOCAINE HYDROCHLORIDE 10 MG/ML
0.5 INJECTION, SOLUTION EPIDURAL; INFILTRATION; INTRACAUDAL; PERINEURAL ONCE AS NEEDED
Status: DISCONTINUED | OUTPATIENT
Start: 2022-07-07 | End: 2022-07-07 | Stop reason: HOSPADM

## 2022-07-07 RX ORDER — HYDRALAZINE HYDROCHLORIDE 20 MG/ML
10 INJECTION INTRAMUSCULAR; INTRAVENOUS EVERY 6 HOURS PRN
Status: DISCONTINUED | OUTPATIENT
Start: 2022-07-07 | End: 2022-07-08 | Stop reason: HOSPADM

## 2022-07-07 RX ORDER — SODIUM CHLORIDE 9 MG/ML
INJECTION, SOLUTION INTRAVENOUS CONTINUOUS PRN
Status: DISCONTINUED | OUTPATIENT
Start: 2022-07-07 | End: 2022-07-07 | Stop reason: SURG

## 2022-07-07 RX ORDER — ACETAMINOPHEN 325 MG/1
650 TABLET ORAL EVERY 4 HOURS PRN
Status: DISCONTINUED | OUTPATIENT
Start: 2022-07-07 | End: 2022-07-08 | Stop reason: HOSPADM

## 2022-07-07 RX ORDER — SODIUM CHLORIDE 0.9 % (FLUSH) 0.9 %
10 SYRINGE (ML) INJECTION EVERY 12 HOURS SCHEDULED
Status: DISCONTINUED | OUTPATIENT
Start: 2022-07-07 | End: 2022-07-07 | Stop reason: HOSPADM

## 2022-07-07 RX ORDER — EPHEDRINE SULFATE 50 MG/ML
INJECTION, SOLUTION INTRAVENOUS AS NEEDED
Status: DISCONTINUED | OUTPATIENT
Start: 2022-07-07 | End: 2022-07-07 | Stop reason: SURG

## 2022-07-07 RX ORDER — MIDAZOLAM HYDROCHLORIDE 1 MG/ML
0.5 INJECTION INTRAMUSCULAR; INTRAVENOUS
Status: DISCONTINUED | OUTPATIENT
Start: 2022-07-07 | End: 2022-07-07 | Stop reason: HOSPADM

## 2022-07-07 RX ORDER — FAMOTIDINE 10 MG/ML
20 INJECTION, SOLUTION INTRAVENOUS ONCE
Status: DISCONTINUED | OUTPATIENT
Start: 2022-07-07 | End: 2022-07-07 | Stop reason: HOSPADM

## 2022-07-07 RX ORDER — PROTAMINE SULFATE 10 MG/ML
INJECTION, SOLUTION INTRAVENOUS AS NEEDED
Status: DISCONTINUED | OUTPATIENT
Start: 2022-07-07 | End: 2022-07-07 | Stop reason: SURG

## 2022-07-07 RX ORDER — ESMOLOL HYDROCHLORIDE 10 MG/ML
INJECTION INTRAVENOUS AS NEEDED
Status: DISCONTINUED | OUTPATIENT
Start: 2022-07-07 | End: 2022-07-07 | Stop reason: SURG

## 2022-07-07 RX ORDER — MAGNESIUM HYDROXIDE 1200 MG/15ML
LIQUID ORAL AS NEEDED
Status: DISCONTINUED | OUTPATIENT
Start: 2022-07-07 | End: 2022-07-07 | Stop reason: HOSPADM

## 2022-07-07 RX ORDER — PANTOPRAZOLE SODIUM 40 MG/1
40 TABLET, DELAYED RELEASE ORAL
Status: DISCONTINUED | OUTPATIENT
Start: 2022-07-07 | End: 2022-07-08 | Stop reason: HOSPADM

## 2022-07-07 RX ORDER — SODIUM CHLORIDE 9 MG/ML
100 INJECTION, SOLUTION INTRAVENOUS CONTINUOUS
Status: ACTIVE | OUTPATIENT
Start: 2022-07-07 | End: 2022-07-07

## 2022-07-07 RX ORDER — ROCURONIUM BROMIDE 10 MG/ML
INJECTION, SOLUTION INTRAVENOUS AS NEEDED
Status: DISCONTINUED | OUTPATIENT
Start: 2022-07-07 | End: 2022-07-07 | Stop reason: SURG

## 2022-07-07 RX ORDER — NICARDIPINE HYDROCHLORIDE 2.5 MG/ML
INJECTION INTRAVENOUS CONTINUOUS PRN
Status: DISCONTINUED | OUTPATIENT
Start: 2022-07-07 | End: 2022-07-07 | Stop reason: SURG

## 2022-07-07 RX ORDER — HEPARIN SODIUM 1000 [USP'U]/ML
INJECTION, SOLUTION INTRAVENOUS; SUBCUTANEOUS AS NEEDED
Status: DISCONTINUED | OUTPATIENT
Start: 2022-07-07 | End: 2022-07-07 | Stop reason: SURG

## 2022-07-07 RX ORDER — ONDANSETRON 4 MG/1
4 TABLET, FILM COATED ORAL EVERY 6 HOURS PRN
Status: DISCONTINUED | OUTPATIENT
Start: 2022-07-07 | End: 2022-07-08 | Stop reason: HOSPADM

## 2022-07-07 RX ORDER — NICARDIPINE HYDROCHLORIDE 2.5 MG/ML
INJECTION INTRAVENOUS AS NEEDED
Status: DISCONTINUED | OUTPATIENT
Start: 2022-07-07 | End: 2022-07-07 | Stop reason: SURG

## 2022-07-07 RX ORDER — HYDROCODONE BITARTRATE AND ACETAMINOPHEN 7.5; 325 MG/1; MG/1
1 TABLET ORAL EVERY 4 HOURS PRN
Status: DISCONTINUED | OUTPATIENT
Start: 2022-07-07 | End: 2022-07-08 | Stop reason: HOSPADM

## 2022-07-07 RX ORDER — ASPIRIN 81 MG/1
81 TABLET ORAL DAILY
Status: DISCONTINUED | OUTPATIENT
Start: 2022-07-08 | End: 2022-07-08 | Stop reason: HOSPADM

## 2022-07-07 RX ORDER — SODIUM CHLORIDE, SODIUM LACTATE, POTASSIUM CHLORIDE, CALCIUM CHLORIDE 600; 310; 30; 20 MG/100ML; MG/100ML; MG/100ML; MG/100ML
9 INJECTION, SOLUTION INTRAVENOUS CONTINUOUS
Status: DISCONTINUED | OUTPATIENT
Start: 2022-07-07 | End: 2022-07-08

## 2022-07-07 RX ORDER — DEXAMETHASONE SODIUM PHOSPHATE 10 MG/ML
INJECTION INTRAMUSCULAR; INTRAVENOUS AS NEEDED
Status: DISCONTINUED | OUTPATIENT
Start: 2022-07-07 | End: 2022-07-07 | Stop reason: SURG

## 2022-07-07 RX ORDER — ETOMIDATE 2 MG/ML
INJECTION INTRAVENOUS AS NEEDED
Status: DISCONTINUED | OUTPATIENT
Start: 2022-07-07 | End: 2022-07-07 | Stop reason: SURG

## 2022-07-07 RX ORDER — LABETALOL HYDROCHLORIDE 5 MG/ML
10 INJECTION, SOLUTION INTRAVENOUS
Status: DISCONTINUED | OUTPATIENT
Start: 2022-07-07 | End: 2022-07-08 | Stop reason: HOSPADM

## 2022-07-07 RX ORDER — BUPIVACAINE HCL/0.9 % NACL/PF 0.125 %
PLASTIC BAG, INJECTION (ML) EPIDURAL AS NEEDED
Status: DISCONTINUED | OUTPATIENT
Start: 2022-07-07 | End: 2022-07-07 | Stop reason: SURG

## 2022-07-07 RX ORDER — ONDANSETRON 2 MG/ML
4 INJECTION INTRAMUSCULAR; INTRAVENOUS EVERY 6 HOURS PRN
Status: DISCONTINUED | OUTPATIENT
Start: 2022-07-07 | End: 2022-07-08 | Stop reason: HOSPADM

## 2022-07-07 RX ADMIN — PANTOPRAZOLE SODIUM 40 MG: 40 TABLET, DELAYED RELEASE ORAL at 11:30

## 2022-07-07 RX ADMIN — Medication 200 MCG: at 07:33

## 2022-07-07 RX ADMIN — Medication 100 MCG: at 07:19

## 2022-07-07 RX ADMIN — LOSARTAN POTASSIUM 50 MG: 50 TABLET, FILM COATED ORAL at 11:30

## 2022-07-07 RX ADMIN — PROTAMINE SULFATE 150 MG: 10 INJECTION, SOLUTION INTRAVENOUS at 08:01

## 2022-07-07 RX ADMIN — SODIUM CHLORIDE, POTASSIUM CHLORIDE, SODIUM LACTATE AND CALCIUM CHLORIDE 9 ML/HR: 600; 310; 30; 20 INJECTION, SOLUTION INTRAVENOUS at 06:10

## 2022-07-07 RX ADMIN — ETOMIDATE 14 MG: 2 INJECTION, SOLUTION INTRAVENOUS at 07:09

## 2022-07-07 RX ADMIN — FAMOTIDINE 20 MG: 20 TABLET ORAL at 06:10

## 2022-07-07 RX ADMIN — Medication 200 MCG: at 07:30

## 2022-07-07 RX ADMIN — SODIUM CHLORIDE 1 G: 900 INJECTION INTRAVENOUS at 11:30

## 2022-07-07 RX ADMIN — ROCURONIUM BROMIDE 50 MG: 10 INJECTION, SOLUTION INTRAVENOUS at 07:09

## 2022-07-07 RX ADMIN — HEPARIN SODIUM 5000 UNITS: 5000 INJECTION, SOLUTION INTRAVENOUS; SUBCUTANEOUS at 21:29

## 2022-07-07 RX ADMIN — HEPARIN SODIUM 5000 UNITS: 5000 INJECTION, SOLUTION INTRAVENOUS; SUBCUTANEOUS at 14:07

## 2022-07-07 RX ADMIN — Medication 100 MCG: at 08:02

## 2022-07-07 RX ADMIN — SUGAMMADEX 200 MG: 100 INJECTION, SOLUTION INTRAVENOUS at 08:01

## 2022-07-07 RX ADMIN — ESMOLOL HYDROCHLORIDE 100 MG: 10 INJECTION, SOLUTION INTRAVENOUS at 07:09

## 2022-07-07 RX ADMIN — DEXAMETHASONE SODIUM PHOSPHATE 4 MG: 10 INJECTION INTRAMUSCULAR; INTRAVENOUS at 07:15

## 2022-07-07 RX ADMIN — PHENYLEPHRINE HYDROCHLORIDE 1 MCG/KG/MIN: 10 INJECTION INTRAVENOUS at 07:28

## 2022-07-07 RX ADMIN — Medication 1000 UNITS: at 21:28

## 2022-07-07 RX ADMIN — ATORVASTATIN CALCIUM 20 MG: 20 TABLET, FILM COATED ORAL at 11:30

## 2022-07-07 RX ADMIN — ONDANSETRON 4 MG: 2 INJECTION INTRAMUSCULAR; INTRAVENOUS at 07:56

## 2022-07-07 RX ADMIN — HEPARIN SODIUM 10000 UNITS: 1000 INJECTION, SOLUTION INTRAVENOUS; SUBCUTANEOUS at 07:39

## 2022-07-07 RX ADMIN — Medication 10 ML: at 09:26

## 2022-07-07 RX ADMIN — NICARDIPINE HYDROCHLORIDE 0.4 MG: 2.5 INJECTION INTRAVENOUS at 07:51

## 2022-07-07 RX ADMIN — Medication 10 ML: at 21:29

## 2022-07-07 RX ADMIN — NICARDIPINE HYDROCHLORIDE 10 MG/HR: 25 INJECTION INTRAVENOUS at 07:49

## 2022-07-07 RX ADMIN — EPHEDRINE SULFATE 10 MG: 50 INJECTION INTRAVENOUS at 07:18

## 2022-07-07 RX ADMIN — HEPARIN SODIUM 5000 UNITS: 1000 INJECTION, SOLUTION INTRAVENOUS; SUBCUTANEOUS at 07:46

## 2022-07-07 RX ADMIN — CYANOCOBALAMIN TAB 1000 MCG 1000 MCG: 1000 TAB at 21:28

## 2022-07-07 RX ADMIN — Medication 100 MCG: at 08:04

## 2022-07-07 RX ADMIN — SODIUM CHLORIDE 100 ML/HR: 9 INJECTION, SOLUTION INTRAVENOUS at 08:29

## 2022-07-07 RX ADMIN — SODIUM CHLORIDE: 9 INJECTION, SOLUTION INTRAVENOUS at 07:22

## 2022-07-07 RX ADMIN — METOPROLOL TARTRATE 25 MG: 25 TABLET, FILM COATED ORAL at 21:29

## 2022-07-07 RX ADMIN — FENTANYL CITRATE 100 MCG: 50 INJECTION, SOLUTION INTRAMUSCULAR; INTRAVENOUS at 07:39

## 2022-07-07 NOTE — PLAN OF CARE
Goal Outcome Evaluation:  NEURO: Drowsy but A&Ox 4. UPTON=.     RESP:   Sats>90% on 3L NC.     CARDIAC: NSR with Cardene titrated to maintain SBP <130. Palpable Pulses. Audible S1 & S2.     GI/:  Audible bowel sounds. No Bm   UOP: 300ml since arrival from OR.       No complaints of pain.

## 2022-07-07 NOTE — PROGRESS NOTES
TAVR Multidisciplinary Team Meeting         Patient Name: Elizabeth Caceres     YOB: 1940     Admission Status: Inpatient     Attendees: Grayson Wade MD, Elizabeth Blas MD, Carlton Osman MD and Isael Clinical Specialist, Marc Howe    Primary presentation of AS: Heart Failure and  dyspnea.    Heart Failure:  N/A    NYHA Functional Class: III    LVEF:  55%    Major Organ Compromise:   N/A    Procedure Specific Impediment:       Valve in valve procedure.    Other Factors:  Major Nutritional Deficit: No  Cognitive Impairment: No  Oxygen dependent: No    STS Risk Score:  Mortality Risk: 3.4%  Mortality and Morbidity Risk: 17%    TAVR Rationale: Severe aortic stenosis         Diagnostic Studies Discussed/ Reviewed: cardiac cath, AXEL, CTA    Procedure planning details:    Previous AVR 2013  Valve Size: 20 valve plus 1cc  Cardiology sheath access: Left femoral  CT Surgery sheath access:  Right femoral    Post Procedure Considerations: Post-procedure monitoring including access site care, H&H monitoring, arrhythmia/AV block monitoring.         VALDEZ Phillips

## 2022-07-07 NOTE — ANESTHESIA POSTPROCEDURE EVALUATION
Patient: Elizabeth Caceres    Procedure Summary     Date: 07/07/22 Room / Location: [facility] OR 01 / [facility]    Anesthesia Start: 0702 Anesthesia Stop:     Procedures:       TRANSAORTIC TRANSCATHETER AORTIC VALVE REPLACEMENT (N/A Chest)      Transcatheter Aortic Valve Replacement (N/A ) Diagnosis:       Aortic stenosis, severe      (Aortic stenosis, severe [I35.0])    Surgeons: Grayson Wade MD; Carlton Osman MD Provider: Radha Billy DO    Anesthesia Type: general, Josephine ASA Status: 4          Anesthesia Type: generalClaire    Vitals  Vitals Value Taken Time   /55 07/07/22 0829   Temp     Pulse 82 07/07/22 0844   Resp     SpO2 92 % 07/07/22 0844   Vitals shown include unvalidated device data.        Post Anesthesia Care and Evaluation    Reason for not using neuraxial anesthesia or a peripheral nerve block: regional anesthesia not attempted:

## 2022-07-07 NOTE — ANESTHESIA PROCEDURE NOTES
Emergent/Open-Heart Anesthesia AXEL    Procedure Performed: Emergent/Open-Heart Anesthesia AXEL     Start Time:        End Time:        General Procedure Information  AXEL Placed for monitoring purposes only -- This is not a diagnostic AXEL  Physician Requesting Echo: Grayson Wade MD  Location performed:  OR  Intubated  Bite block placed  Heart visualized  Probe Insertion:  Easy  Probe Type:  Multiplane  Modalities:  2D only, color flow mapping, continuous wave Doppler and pulse wave Doppler        Anesthesia Information      Echocardiogram Comments:       Abbreviated AXEL for TAVR by A Wenceslao YOU  TAVR team:    Wan Little Aslam  AXEL passed with ease after preop RBA discussed    Baseline:   LVEF 45% without obvious WMA.  RV function mildly reduced, mild dilation.  Prosthetic AV with severe stenosis, SHARA 0.4 by CE, mean gradient 54mmhg, vmax 4.8m/s; DI 0.16.  Moderate AI (VC 0.4).  Moderate MR with central jet.  Mild Tr, trace PI. No PFO, ÁNGEL clot, nor obvious baseline effusion.  Minimal nonmobile atheroma in aorta.  Findings reviewed with TAVR team in real time.     Post TAVR:  S/p valve in valve 20mm bAVR bioprosthesis - appears well seated, no rocking motion, leaflets opening well, mild PVl under AMVL, mean gradient 19 mmhg - discussed extensively with TAVR team.  RV and LV function preserved, no new singificant valvulopathy nor effusion.  Visualized portions of aorta intact.  Findings shared with TAVR team in OR.

## 2022-07-07 NOTE — BRIEF OP NOTE
TRANSCATHETER AORTIC VALVE REPLACEMENT  Progress Note    Elizabeth Caceres  7/7/2022    Pre-op Diagnosis:   Aortic stenosis, severe [I35.0]       Post-Op Diagnosis Codes:     * Aortic stenosis, severe [I35.0]    Procedure/CPT® Codes:    Procedure(s):  TRANSCATHETER AORTIC VALVE IN VALVE REPLACEMENT    Surgeon(s):  Grayson Wade MD Hollingsworth, Paula W, MD Aslam, Azhar, MD    Anesthesia: General    Staff:   Circulator: Tiffany Kwong RN  Perfusionist: Danilo Bailey  Radiology Technologist: Maxx Epstein RT  Scrub Person: Latosha Hendrix; Josefina Grullon  Nursing Assistant: Alyx Cuevas  Assistant: Braulio Lyons PA  Invasive Technologist: Anton Engle; Faith Morrison  Assistant: Braulio Lyons PA      Estimated Blood Loss: minimal    Urine Voided: * No values recorded between 7/7/2022  7:02 AM and 7/7/2022  8:03 AM *    Specimens:                None          Drains: * No LDAs found *    Findings: Trivial paravalvular leak status post TAVR on AXEL.          Complications: None    Assistant: Braulio Lyons PA  was responsible for performing the following activities: Retraction and their skilled assistance was necessary for the success of this case.    Grayson Wade MD     Date: 7/7/2022  Time: 08:13 EDT

## 2022-07-07 NOTE — PROGRESS NOTES
Intensive Care Admission Note     Aortic stenosis, severe   postoperative respiratory, hemodynamic and electrolyte management.    History of Present Illness     Elizabeth Caceres is a 82 y.o. female with PMH of CHF, COPD with prior tobacco use, dyslipidemia, HTN, and known aortic valve stenosis s/p bioprosthestic valve in 2013 with progressive indices admitted to [facility] on 7/5 for decompensated heart failure likely 2* severe aortic stenosis.     The patient is followed by cardiology with [facility] in May revealing restricted aortic leaflet mobility and severe AS with peak velocity 511 cm/s, max gradient 104, and mean gradient 56 evaluated by CTS who recommended future TAVR.     She presented to our ED with complaints of worsening exertional dyspnea found to be in a volume overload state with bilateral pulmonary effusions treated with diuresis. Repeat echo reiterated severe AS with moderate MR and mild-moderate TR. She was evaluated by cardiology and CTS who recommended expedited surgical intervention.     COVID-19 testing on admission negative. She has received 3 doses of the Pfizer vaccine.     There are no PFTs for review.     Problem List, Surgical History, Family, Social History, and ROS     Patient Active Problem List    Diagnosis    • *Severe AS s/p bioprothetic valve in 2013  [I35.0]    • CHF  [I50.9, I38]    • Former tobacco use [Z87.891]    • Hyperlipidemia [E78.5]    • COPD [J44.9]    • Valvular heart disease [I38]    • HTN [I10]    • Vitamin D deficiency [E55.9]    • Vitamin C deficiency [E54]    • H/O syncope [Z87.898]      Past Surgical History:   Procedure Laterality Date   • AORTIC VALVE REPAIR/REPLACEMENT  10/2013    Dr. Sebastian   • APPENDECTOMY N/A 4/23/2019    Procedure: APPENDECTOMY LAPAROSCOPIC;  Surgeon: Peter Vargas MD;  Location: [facility] OR;  Service: General   • CARDIAC CATHETERIZATION Left 5/19/2022    Procedure: Left Heart Cath;  Surgeon: Carlton Osman MD;  Location:  [facility];   "Service: Cardiology;  Laterality: Left;  SAME DAY AS [facility]   • CARDIAC SURGERY     • COLONOSCOPY     • HIP ARTHROPLASTY Left    • TOTAL LAPAROSCOPIC HYSTERECTOMY SALPINGO OOPHORECTOMY Right 2019    Procedure: LAPAROSCOPIC SALPINGOOPHORECTOMY;  Surgeon: Peter Vargas MD;  Location: [facility] OR;  Service: General       Allergies   Allergen Reactions   • Carvedilol Nausea Only     \"INTOLERANCE\"     No current facility-administered medications on file prior to encounter.     Current Outpatient Medications on File Prior to Encounter   Medication Sig   • aspirin 81 MG EC tablet Take 81 mg by mouth Daily.   • atorvastatin (LIPITOR) 20 MG tablet Take 20 mg by mouth Daily.   • cholecalciferol (VITAMIN D3) 1000 units tablet Take 1,000 Units by mouth Every Night.   • losartan (COZAAR) 50 MG tablet Take 1 tablet by mouth Daily.   • metoprolol tartrate (LOPRESSOR) 25 MG tablet Take 25 mg by mouth 2 (Two) Times a Day.   • vitamin B-12 (CYANOCOBALAMIN) 1000 MCG tablet Take 1,000 mcg by mouth Every Night.   • vitamin C (ASCORBIC ACID) 500 MG tablet Take 500 mg by mouth Every Night.     MEDICATION LIST AND ALLERGIES REVIEWED.    Family History   Problem Relation Age of Onset   • Cancer Father    • Heart disease Brother      Social History     Tobacco Use   • Smoking status: Former Smoker     Quit date:      Years since quittin.5   • Smokeless tobacco: Never Used   Vaping Use   • Vaping Use: Never used   Substance Use Topics   • Alcohol use: No   • Drug use: No     Social History     Social History Narrative   • Not on file     FAMILY AND SOCIAL HISTORY REVIEWED.    Review of Systems  Unable to perform review of system as patient in respiratory distress    Physical Exam and Clinical Information   /74 (BP Location: Left arm, Patient Position: Lying) Comment: 142/74 RIGHT  Pulse 73   Temp 97 °F (36.1 °C) (Tympanic)   Resp 16   Ht 160 cm (63\")   Wt 70.3 kg (155 lb)   LMP  (LMP Unknown)   SpO2 94%   BMI 27.46 " kg/m²   Physical Exam  General Appearance: Sleepy, lethargic in moderate respiratory distress  Head:    Atraumatic, Normocephalic, without obvious abnormality, Pupils reactive & symmetrical B/L.   Lungs:   B/L Breath sounds present with decreased breath sounds on bases, no wheezing heard, no crackles.   Heart: S1 and S2 present, no murmur,  Abdomen: Obese, soft, nontender, no guarding or rigidity, bowel sounds positive.  Extremities: Groin access sheaths are in place, no hematoma no cyanosis or clubbing,  no edema, warm to touch.  Pulses: Positive and symmetric.  Neurologic: Lethargic and weak but moving all 4 extremities    Results from last 7 days   Lab Units 07/06/22  0817 07/05/22  1455   WBC 10*3/mm3 5.84 6.85   HEMOGLOBIN g/dL 9.3* 8.7*   PLATELETS 10*3/mm3 200 201     Results from last 7 days   Lab Units 07/06/22  0817 07/05/22  1455   SODIUM mmol/L 142 143   POTASSIUM mmol/L 4.4 4.1   CO2 mmol/L 29.0 25.0   BUN mg/dL 29* 25*   CREATININE mg/dL 0.90 1.01*   GLUCOSE mg/dL 94 96     Estimated Creatinine Clearance: 45.3 mL/min (by C-G formula based on SCr of 0.9 mg/dL).          Lab Results   Component Value Date    LACTATE 0.9 04/16/2022        Images: Stat chest x-ray done mild cardiomegaly, bibasilar effusions/atelectasis, stable.  No definite evidence of pulmonary vascular congestion.    I reviewed the patient's results and images.     Impression     Severe AS s/p bioprothetic valve in 2013     CHF     Valvular heart disease    HTN    Hyperlipidemia    COPD    Former tobacco use    Plan/Recommendations     82-year-old female with past medical history of CHF, COPD with prior tobacco use, dyslipidemia, hypertension aortic valve stenosis s/p bioprosthetic valve in 2013.  Patient was admitted to [facility] on July 5 for decompensated heart failure thought to be secondary to severe arctic stenosis.  Patient was not deemed candidate for surgical reoperation but considered candidate for TAVR.  Patient  was taken to operating room and underwent placement of 20 mm Kirkland SANGITA tissue valve.  Procedure was uneventful per report.    Post procedure patient was brought to ICU on nasal cannula oxygen saturating 75%.  Patient was immediately placed on nonrebreather mask with saturations improving to 88%.  Patient was quite lethargic.  She apparently was reversed from anesthesia prior to coming in but due to her significant lethargy and persistent hypoxemia stat chest x-ray was done which did not show any untoward pathology.  Subsequently patient placed on NIV with improvement in saturations.  Patient is starting to wake up little more and following commands.  No obvious focal deficit noted currently.    1.  Admit to intensive care unit for closer hemodynamic and respiratory monitoring.  2.  Worsening hypoxemic respiratory failure postprocedure likely secondary to anesthetic effect.  Other etiologies are possible and will monitor closely.  Will continue NIV support for now and give a break from it and a little bit and see how things go for her.  Patient has history of COPD but remote smoking history.  She is not on any inhaler therapy at home.  No PFTs available at this time.  Will watch her.  As needed bronchodilators for now.  3.  Patient has mildly elevated LFTs, likely secondary to congestive change. Will trend LFTs.  4.  Continue aspirin, statin.  5.  Continue antihypertensives with her beta-blocker and losartan as tolerated.  6.  GI prophylaxis.  7.  Heparin for DVT prophylaxis.  8.  Mild leukocytosis and a UA concerning for UTI.  Will add Rocephin.  Will send urine for culture.  Previous urine cultures have shown E. coli with no drug-resistant organisms.    Continue ICU monitoring for now.    Check labs in the morning.    Electronically signed by VALDEZ Bolton, 07/07/22, 7:29 AM EDT.    I have seen and examined patient, performing a face-to-face diagnostic evaluation with plan of care reviewed and developed  with APRN and nursing staff. I have addended and modified the above history of present illness, conducted physical examination and documented as above, and assessment and plan to reflect my findings and impressions.     Level of Risk High due to:  illness with threat to life or bodily function    Time spent 42 min (exclusive of procedure time)  including high complexity decision making to assess, manipulate, and support vital organ system failure in this individual who has impairment of one or more vital organ systems such that there is a high probability of imminent or life threatening deterioration in the patient’s condition.      Tiago Santiago MD, [facility]  Pulmonary and Critical Care Medicine  07/07/22 10:14 EDT       CC: Eric Wei MD

## 2022-07-07 NOTE — ANESTHESIA PROCEDURE NOTES
Airway  Urgency: elective    Date/Time: 7/7/2022 7:11 AM  Airway not difficult    General Information and Staff    Patient location during procedure: OR  Anesthesiologist: Radha Billy DO  CRNA/CAA: Braulio Duque CRNA    Indications and Patient Condition  Indications for airway management: airway protection    Preoxygenated: yes  MILS not maintained throughout  Mask difficulty assessment: 1 - vent by mask    Final Airway Details  Final airway type: endotracheal airway      Successful airway: ETT  Cuffed: yes   Successful intubation technique: direct laryngoscopy  Endotracheal tube insertion site: oral  Blade: Urban  Blade size: 3  ETT size (mm): 7.0  Cormack-Lehane Classification: grade I - full view of glottis  Placement verified by: chest auscultation and capnometry   Measured from: lips  ETT/EBT  to lips (cm): 20  Number of attempts at approach: 1  Assessment: lips, teeth, and gum same as pre-op and atraumatic intubation    Additional Comments  Negative epigastric sounds, Breath sound equal bilaterally with symmetric chest rise and fall

## 2022-07-07 NOTE — OP NOTE
PROCEDURE(S):   1. Left femoral arterial sheath placement.  2. Left femoral venous sheath placement.  3. Temporary transvenous pacemaker insertion.  4. Catheter placement in the aortic root.  5. Multiple intraprocedural aortograms.   6. Transcatheter aortic valve replacement with 20 mm Kirkland SANGITA III tissue  valve.     INDICATIONS:   1. Critical bioprosthetic valve aortic stenosis.  2. Patient is not a suitable candidate for operation.      INTERVENTIONAL CARDIOLOGISTS:  1. Carlton Osman MD.  2. Elizabeth Blas M.D.    CARDIAC SURGEONS:   1. Grayson Wade M.D.    DESCRIPTION OF PROCEDURE:   After informed consent, the patient was brought to the OR in a fasting condition, prepped and draped from level of chin to knees by standard surgical technique. Left femoral arterial access was obtained by percutaneous anterior wall puncture technique and a 6-Austrian arterial sheath was placed. Venous access was obtained in similar fashion and an 8-Austrian venous sheath was placed. Temporary transvenous pacing electrode was inserted via the left femoral venous access and advanced to the right ventricular apex and excellent pacing/sensing was noted. A pigtail catheter was advanced from the femoral arterial access and this was advanced to the aortic root, and multiple intraprocedural aortograms were performed.   At this time, right femoral arterial access was obtained by the surgeons and a 14 Austrian Kirkland sheath was placed(see separate note). Using this access, AL1 catheter, and straight wire, the aortic stenosis was crossed. The catheter was advanced into the left ventricle and the wire was exchanged for a Safari wire. At this a 20 mm Kirkland SANGITA III valve was advanced using standard delivery system. This was positioned under fluoroscopy. After the satisfactory position was confirmed, the valve was expanded under rapid pacing protocol. Satisfactory position was noted. Post implant images revealed trivial aortic  insufficiency. No significant regurgitation was noted.  Subsequently the delivery system was removed. The arterial sheath was removed and the access site was closed by the surgeons (see separate note). The patient tolerated the procedure well and without complications.    FINAL IMPRESSION:   · Successful transcatheter aortic valve replacement with 20 mm        Kirkland SANGITA tissue valve.   · No acute procedure-related complications.     Carlton Osman MD, FACC, [facility]

## 2022-07-07 NOTE — ANESTHESIA POSTPROCEDURE EVALUATION
Patient: Elizabeth Caceres    Procedure Summary     Date: 07/07/22 Room / Location: [facility] OR 01 / [facility] HYBRID [facility]    Anesthesia Start: 0702 Anesthesia Stop:     Procedures:       TRANSAORTIC TRANSCATHETER AORTIC VALVE REPLACEMENT (N/A Chest)      Transcatheter Aortic Valve Replacement (N/A ) Diagnosis:       Aortic stenosis, severe      (Aortic stenosis, severe [I35.0])    Surgeons: Grayson Wade MD; Carlton Osman MD Provider: Radha Billy DO    Anesthesia Type: general, [place] ASA Status: 4          Anesthesia Type: general, [place]    Vitals  Vitals Value Taken Time   /55 07/07/22 0829   Temp     Pulse 88 07/07/22 0841   Resp     SpO2 88 % 07/07/22 0841   Vitals shown include unvalidated device data.        Post Anesthesia Care and Evaluation    Patient location during evaluation: ICU  Patient participation: complete - patient participated  Level of consciousness: awake and alert  Pain score: 0  Pain management: adequate    Airway patency: patent  Anesthetic complications: No anesthetic complications  PONV Status: none  Cardiovascular status: hemodynamically stable and acceptable  Respiratory status: nonlabored ventilation, acceptable, nasal cannula and spontaneous ventilation (NON-REBREATHER MASK)  Hydration status: acceptable    Comments: Pt's oxygen saturation 83% on 15 lpm NRB mask. ICU nurse connecting high flow mask. BiPAP to be used if oxygen saturation doesn't improve.

## 2022-07-07 NOTE — PLAN OF CARE
Goal Outcome Evaluation:  Plan of Care Reviewed With: patient        Progress: no change  Outcome Evaluation: Patient maintained NPO status throughout the night, VSS, patient has no c/o pain or discomfort, awaiting surgery this AM, CITLALY RN

## 2022-07-07 NOTE — ANESTHESIA PROCEDURE NOTES
Arterial Line      Patient reassessed immediately prior to procedure    Patient location during procedure: pre-op  Start time: 7/7/2022 6:40 AM  Stop Time:7/7/2022 6:45 AM       Line placed for hemodynamic monitoring.  Performed By   Anesthesiologist: Radha Billy DO  Preanesthetic Checklist  Completed: patient identified, IV checked, site marked, risks and benefits discussed, surgical consent, monitors and equipment checked, pre-op evaluation and timeout performed  Arterial Line Prep   Sterile Tech: cap, gloves and sterile barriers  Prep: ChloraPrep  Patient monitoring: blood pressure monitoring, continuous pulse oximetry and EKG  Arterial Line Procedure   Laterality:left  Location:  radial artery  Catheter size: 20 G   Guidance: ultrasound guided and palpation technique  Number of attempts: 1  Successful placement: yes  Post Assessment   Dressing Type: line sutured, occlusive dressing applied, secured with tape, wrist guard applied and biopatch applied.   Complications no  Circ/Move/Sens Assessment: normal and unchanged.   Patient Tolerance: patient tolerated the procedure well with no apparent complications

## 2022-07-08 ENCOUNTER — READMISSION MANAGEMENT (OUTPATIENT)
Dept: CALL CENTER | Facility: HOSPITAL | Age: 82
End: 2022-07-08

## 2022-07-08 VITALS
RESPIRATION RATE: 18 BRPM | TEMPERATURE: 97.7 F | HEIGHT: 63 IN | DIASTOLIC BLOOD PRESSURE: 48 MMHG | HEART RATE: 68 BPM | SYSTOLIC BLOOD PRESSURE: 121 MMHG | BODY MASS INDEX: 27.46 KG/M2 | WEIGHT: 155 LBS | OXYGEN SATURATION: 91 %

## 2022-07-08 DIAGNOSIS — I35.0 AORTIC STENOSIS, SEVERE: Primary | ICD-10-CM

## 2022-07-08 LAB
ACT BLD: 132 SECONDS (ref 82–152)
ACT BLD: 277 SECONDS (ref 82–152)
ANION GAP SERPL CALCULATED.3IONS-SCNC: 7 MMOL/L (ref 5–15)
BASE EXCESS BLDA CALC-SCNC: -1 MMOL/L (ref -5–5)
BH BB BLOOD EXPIRATION DATE: NORMAL
BH BB BLOOD EXPIRATION DATE: NORMAL
BH BB BLOOD TYPE BARCODE: 5100
BH BB BLOOD TYPE BARCODE: 5100
BH BB DISPENSE STATUS: NORMAL
BH BB DISPENSE STATUS: NORMAL
BH BB PRODUCT CODE: NORMAL
BH BB PRODUCT CODE: NORMAL
BH BB UNIT NUMBER: NORMAL
BH BB UNIT NUMBER: NORMAL
BH CV ECHO MEAS - AO MAX PG: 33.8 MMHG
BH CV ECHO MEAS - AO MEAN PG: 16.9 MMHG
BH CV ECHO MEAS - AO V2 MAX: 290.6 CM/SEC
BH CV ECHO MEAS - AO V2 VTI: 59.9 CM
BH CV ECHO MEAS - AVA(I,D): 1.7 CM2
BH CV ECHO MEAS - LV MAX PG: 5.1 MMHG
BH CV ECHO MEAS - LV MEAN PG: 5.6 MMHG
BH CV ECHO MEAS - LV V1 MAX: 79.9 CM/SEC
BH CV ECHO MEAS - LV V1 VTI: 37.3 CM
BH CV ECHO MEAS - LVOT DIAM: 1.7 CM
BUN SERPL-MCNC: 28 MG/DL (ref 8–23)
BUN/CREAT SERPL: 37.3 (ref 7–25)
CA-I BLDA-SCNC: 1.24 MMOL/L (ref 1.2–1.32)
CALCIUM SPEC-SCNC: 8.8 MG/DL (ref 8.6–10.5)
CHLORIDE SERPL-SCNC: 110 MMOL/L (ref 98–107)
CO2 BLDA-SCNC: 24 MMOL/L (ref 24–29)
CO2 SERPL-SCNC: 25 MMOL/L (ref 22–29)
CREAT SERPL-MCNC: 0.75 MG/DL (ref 0.57–1)
CROSSMATCH INTERPRETATION: NORMAL
CROSSMATCH INTERPRETATION: NORMAL
DEPRECATED RDW RBC AUTO: 47.8 FL (ref 37–54)
EGFRCR SERPLBLD CKD-EPI 2021: 79.6 ML/MIN/1.73
ERYTHROCYTE [DISTWIDTH] IN BLOOD BY AUTOMATED COUNT: 14.3 % (ref 12.3–15.4)
GLUCOSE BLDC GLUCOMTR-MCNC: 110 MG/DL (ref 70–130)
GLUCOSE SERPL-MCNC: 105 MG/DL (ref 65–99)
HCO3 BLDA-SCNC: 23.4 MMOL/L (ref 22–26)
HCT VFR BLD AUTO: 27 % (ref 34–46.6)
HCT VFR BLDA CALC: 29 % (ref 38–51)
HGB BLD-MCNC: 8.3 G/DL (ref 12–15.9)
HGB BLDA-MCNC: 9.9 G/DL (ref 12–17)
LV EF 2D ECHO EST: 55 %
MCH RBC QN AUTO: 29 PG (ref 26.6–33)
MCHC RBC AUTO-ENTMCNC: 30.7 G/DL (ref 31.5–35.7)
MCV RBC AUTO: 94.4 FL (ref 79–97)
PCO2 BLDA: 36.7 MM HG (ref 35–45)
PH BLDA: 7.41 PH UNITS (ref 7.35–7.6)
PLATELET # BLD AUTO: 215 10*3/MM3 (ref 140–450)
PMV BLD AUTO: 10.3 FL (ref 6–12)
PO2 BLDA: 70 MMHG (ref 80–105)
POTASSIUM BLDA-SCNC: 4.2 MMOL/L (ref 3.5–4.9)
POTASSIUM SERPL-SCNC: 4.5 MMOL/L (ref 3.5–5.2)
QT INTERVAL: 420 MS
QTC INTERVAL: 466 MS
RBC # BLD AUTO: 2.86 10*6/MM3 (ref 3.77–5.28)
SAO2 % BLDA: 94 % (ref 95–98)
SODIUM BLD-SCNC: 143 MMOL/L (ref 138–146)
SODIUM SERPL-SCNC: 142 MMOL/L (ref 136–145)
UNIT  ABO: NORMAL
UNIT  ABO: NORMAL
UNIT  RH: NORMAL
UNIT  RH: NORMAL
WBC NRBC COR # BLD: 9.85 10*3/MM3 (ref 3.4–10.8)

## 2022-07-08 PROCEDURE — 80048 BASIC METABOLIC PNL TOTAL CA: CPT | Performed by: PHYSICIAN ASSISTANT

## 2022-07-08 PROCEDURE — 85027 COMPLETE CBC AUTOMATED: CPT | Performed by: PHYSICIAN ASSISTANT

## 2022-07-08 PROCEDURE — 99232 SBSQ HOSP IP/OBS MODERATE 35: CPT | Performed by: INTERNAL MEDICINE

## 2022-07-08 PROCEDURE — 99024 POSTOP FOLLOW-UP VISIT: CPT | Performed by: STUDENT IN AN ORGANIZED HEALTH CARE EDUCATION/TRAINING PROGRAM

## 2022-07-08 PROCEDURE — 93010 ELECTROCARDIOGRAM REPORT: CPT | Performed by: INTERNAL MEDICINE

## 2022-07-08 PROCEDURE — 25010000002 HEPARIN (PORCINE) PER 1000 UNITS: Performed by: INTERNAL MEDICINE

## 2022-07-08 PROCEDURE — 99239 HOSP IP/OBS DSCHRG MGMT >30: CPT | Performed by: NURSE PRACTITIONER

## 2022-07-08 PROCEDURE — 93005 ELECTROCARDIOGRAM TRACING: CPT | Performed by: PHYSICIAN ASSISTANT

## 2022-07-08 RX ORDER — CLOPIDOGREL BISULFATE 75 MG/1
75 TABLET ORAL DAILY
Qty: 90 TABLET | Refills: 1 | Status: SHIPPED | OUTPATIENT
Start: 2022-07-09 | End: 2022-08-17

## 2022-07-08 RX ORDER — CIPROFLOXACIN 250 MG/1
250 TABLET, FILM COATED ORAL 2 TIMES DAILY
Qty: 6 TABLET | Refills: 0 | Status: SHIPPED | OUTPATIENT
Start: 2022-07-08 | End: 2022-07-11

## 2022-07-08 RX ORDER — FUROSEMIDE 20 MG/1
20 TABLET ORAL DAILY
Qty: 30 TABLET | Refills: 3 | Status: SHIPPED | OUTPATIENT
Start: 2022-07-08 | End: 2022-10-04 | Stop reason: SDUPTHER

## 2022-07-08 RX ORDER — CLOPIDOGREL BISULFATE 75 MG/1
300 TABLET ORAL ONCE
Status: COMPLETED | OUTPATIENT
Start: 2022-07-08 | End: 2022-07-08

## 2022-07-08 RX ORDER — FUROSEMIDE 20 MG/1
20 TABLET ORAL DAILY
Status: DISCONTINUED | OUTPATIENT
Start: 2022-07-08 | End: 2022-07-08 | Stop reason: HOSPADM

## 2022-07-08 RX ORDER — CLOPIDOGREL BISULFATE 75 MG/1
75 TABLET ORAL DAILY
Status: DISCONTINUED | OUTPATIENT
Start: 2022-07-09 | End: 2022-07-08 | Stop reason: HOSPADM

## 2022-07-08 RX ADMIN — ATORVASTATIN CALCIUM 20 MG: 20 TABLET, FILM COATED ORAL at 08:38

## 2022-07-08 RX ADMIN — FUROSEMIDE 20 MG: 20 TABLET ORAL at 08:37

## 2022-07-08 RX ADMIN — PANTOPRAZOLE SODIUM 40 MG: 40 TABLET, DELAYED RELEASE ORAL at 06:09

## 2022-07-08 RX ADMIN — Medication 10 ML: at 08:46

## 2022-07-08 RX ADMIN — HEPARIN SODIUM 5000 UNITS: 5000 INJECTION, SOLUTION INTRAVENOUS; SUBCUTANEOUS at 06:09

## 2022-07-08 RX ADMIN — ASPIRIN 81 MG: 81 TABLET, COATED ORAL at 08:38

## 2022-07-08 RX ADMIN — CLOPIDOGREL BISULFATE 300 MG: 75 TABLET ORAL at 08:37

## 2022-07-08 NOTE — DISCHARGE SUMMARY
Discharge Summary    Patient name: Elizabeth Caceres  CSN: [id number]  MRN: [id number]  : 1940  Today's date: 2022     Date of Admission: 2022  Date of Discharge:  2022    Admitting Physician:  Darrick Cantu III, DO  Primary Care Provider: Eric Wei MD  Consultations:   Carlton Osman MD  Cardiology  Grayson Wade MD  Cardiothoracic Surgery     Admission Diagnosis: Severe aortic stenosis      Discharge Diagnoses:   Active Hospital Problems    Diagnosis    • **Severe AS s/p bioprothetic valve in 2013     • CHF     • Former tobacco use    • Hyperlipidemia    • COPD    • HTN    • Valvular heart disease        Allergies:  Carvedilol    Code Status and Medical Interventions:   Ordered at: 22 0834     Code Status (Patient has no pulse and is not breathing):    CPR (Attempt to Resuscitate)     Medical Interventions (Patient has pulse or is breathing):    Full Support       Procedures/Testing:  Transcatheter Aortic Valve Replacement       History of Present Illness:  Elizabeth Caceres is a 82 y.o. female with PMH of CHF, COPD with prior tobacco use, dyslipidemia, HTN, and known aortic valve stenosis s/p bioprosthestic valve in  with progressive indices admitted to [facility] on  for decompensated heart failure likely 2* severe aortic stenosis.      The patient is followed by cardiology with [facility] in May revealing restricted aortic leaflet mobility and severe AS with peak velocity 511 cm/s, max gradient 104, and mean gradient 56 evaluated by CTS who recommended future TAVR in August.      She presented to our ED on  with complaints of worsening exertional dyspnea found to be in a volume overload state with bilateral pulmonary effusions treated with diuresis. Repeat echo reiterated severe AS with moderate MR and mild-moderate TR. She was admitted and evaluated by cardiology and CTS who recommended expedited surgical intervention.    COVID-19 testing on admission negative. She  "has received 3 doses of the Pfizer vaccine.    Hospital Course:  The patient was admitted for reasons mentioned above in the HPI followed by cardiology and CTS in preparation for surgical intervention of the patient's known progressive aortic stenosis in the presence of a bioprosthetic valve. On 7/7 Dr. Wade performed a transcatheter aortic valve replacement with a 20 mm Carmen 3 Ultra tissue valve with completion aortogram. Post-operatively she was recovered in the ICU and liberated from the ventilator without complication. Due to the post-implant residual gradient, Plavix was added per cardiology's recommendation in addition to Lasix. On POD 1 she remains hemodynamically stable and labs are within acceptable parameters. She is eating well and continent of bowel and bladder. She is independently ambulatory and felt to reach her baseline functionality, therefore she will be discharged home today with the medications and recommendations listed below. UA on admission was suggestive of a UTI treated with IV Rocephin and she will be discharged on a course of Cipro.     Vitals:  BP 93/50   Pulse 70   Temp 97.7 °F (36.5 °C) (Oral)   Resp 18   Ht 160 cm (63\")   Wt 70.3 kg (155 lb)   LMP  (LMP Unknown)   SpO2 97%   BMI 27.46 kg/m²     Physical Exam:  Constitutional:  Appears well-developed and well-nourished. No distress.   HEENT:  Normocephalic and atraumatic. PERRL  Neck:  Neck supple. No JVD present.   CV: Normal rate, regular rhythm,  intact distal pulses. No gallop, murmur or rub.  Pulmonary/Chest: Effort normal and breath sounds normal. No respiratory distress. No wheezes, rhonchi or rales.   Abdominal: Soft. Obese. +BS. No distension and no mass. There is no tenderness.   Musculoskeletal: Normal muscle tone and strength  Neurological: Alert and oriented to person, place, and time.  No focal deficits  Skin: Skin is warm and dry. No rash noted.   Extremities:  No clubbing, edema or cyanosis  Psychiatric: " Normal mood and affect. Behavior is normal.     Labs:  Results from last 7 days   Lab Units 07/08/22  0528   WBC 10*3/mm3 9.85   HEMOGLOBIN g/dL 8.3*   HEMATOCRIT % 27.0*   PLATELETS 10*3/mm3 215     Results from last 7 days   Lab Units 07/08/22  0528 07/06/22  0817 07/05/22  1455   SODIUM mmol/L 142   < > 143   POTASSIUM mmol/L 4.5   < > 4.1   CHLORIDE mmol/L 110*   < > 108*   CO2 mmol/L 25.0   < > 25.0   BUN mg/dL 28*   < > 25*   CREATININE mg/dL 0.75   < > 1.01*   CALCIUM mg/dL 8.8   < > 8.9   BILIRUBIN mg/dL  --   --  0.7   ALK PHOS U/L  --   --  115   ALT (SGPT) U/L  --   --  52*   AST (SGOT) U/L  --   --  42*   GLUCOSE mg/dL 105*   < > 96    < > = values in this interval not displayed.         No results found for: MG, PHOS                 Discharge Medications      New Medications      Instructions Start Date   ciprofloxacin 250 MG tablet  Commonly known as: Cipro   250 mg, Oral, 2 Times Daily      clopidogrel 75 MG tablet  Commonly known as: PLAVIX   75 mg, Oral, Daily   Start Date: July 9, 2022     furosemide 20 MG tablet  Commonly known as: LASIX   20 mg, Oral, Daily         Continue These Medications      Instructions Start Date   aspirin 81 MG EC tablet   81 mg, Oral, Daily      atorvastatin 20 MG tablet  Commonly known as: LIPITOR   20 mg, Oral, Daily      cholecalciferol 25 MCG (1000 UT) tablet  Commonly known as: VITAMIN D3   1,000 Units, Oral, Nightly      losartan 50 MG tablet  Commonly known as: COZAAR   50 mg, Oral, Daily      metoprolol tartrate 25 MG tablet  Commonly known as: LOPRESSOR   25 mg, Oral, 2 Times Daily      vitamin B-12 1000 MCG tablet  Commonly known as: CYANOCOBALAMIN   1,000 mcg, Oral, Nightly      vitamin C 500 MG tablet  Commonly known as: ASCORBIC ACID   500 mg, Oral, Nightly             Diet Instructions     Diet: Cardiac      Discharge Diet: Cardiac    Diet: Regular, Consistent Carbohydrate; Thin      Discharge Diet:  Regular  Consistent Carbohydrate       Fluid Consistency:  Thin          Activity Instructions     Activity as Tolerated      Bathing Restrictions      Do not submerge incisions in bathtub, swimming pools, hot tub, or any body of water. You may ONLY shower and allow water to run over incisions, then pat dry.    Type of Restriction: Bathing    Bathing Restrictions: No Tub Bath    Driving Restrictions      Type of Restriction: Driving    Driving Restrictions: No Driving Until Next Appointment    Lifting Restrictions      Type of Restriction: Lifting    Lifting Restrictions: Lifting Restriction (Indicate Limit)    Weight Limit (Pounds): 10    Length of Lifting Restriction: until next appointment          Follow-up Appointments  Future Appointments   Date Time Provider Department Center   7/19/2022 11:00 AM Carlton Osman MD [facility]     Additional Instructions for the Follow-ups that You Need to Schedule     Discharge Follow-up with PCP   As directed       Currently Documented PCP:    Eric Wei MD    PCP Phone Number:    [phone]     Follow Up Details: In 2-4 weeks         Discharge Follow-up with Specialty: 3 Weeks; Cardiothoracic Surgery   As directed      Follow Up: 3 Weeks    Follow Up Details: Follow Up in 2-4 Weeks    Specialty: Cardiothoracic Surgery         Discharge Follow-up with Specialty: Dr Osman at [place] office; 6 Weeks   As directed      Specialty: Dr Osman at [place] office    Follow Up: 6 Weeks         Discharge Follow-up with Specialty: Dr Wade at [place] [facility]   As directed      Specialty: Dr Wade at [place] [facility]    Follow Up Details: In 4 to 6 weeks.         Discharge Follow-up with Specialty: [facility]; 1 Week   As directed      Specialty: [facility]    Follow Up: 1 Week    Follow Up Details: Follow Up With [facility] Within 7 Days of Discharge. If Discharged on a Weekend, Schedule Follow Up For The Following Friday at 0900.         Cardiac Rehab Evaluation and Enrollment   Jul 13, 2022       Reason for Consult?: Education               Discharge Instructions:  1. Discharge home today  2. Medications as above  3. Follow-ups as above  4. Activity restrictions as above  5. Bathing restrictions as above  6. If symptoms worsen or recur, seek medical attention or call 911      Electronically signed by VALDEZ Bolton, 07/08/22, 9:51 AM EDT.    Time: I spent  35  minutes on this discharge activity which included: face-to-face encounter with the patient, reviewing the data in the system, coordination of the care with the nursing staff as well as consultants, documentation, and entering orders.      CC: Eric Wei MD

## 2022-07-08 NOTE — OP NOTE
DATE OF PROCEDURE: 7/7/2022      PREOPERATIVE DIAGNOSES:  1. Severe aortic valve stenosis  2. Acute congestive heart failure      POSTOPERATIVE DIAGNOSES:    1. Severe aortic valve stenosis  2. Acute congestive heart failure      PROCEDURE:    1. Percutaneous right common femoral arterial sheath placement  2. Aortogram  3. Transcatheter aortic valve in valve replacement (20 mm Carmen 3 Ultra tissue valve)  4. Completion aortogram      SURGEON: Grayson Wade MD        Assistant: Braulio Lyons PA was responsible for performing the following activities: Retraction and their skilled assistance was necessary for the success of this case.    CARDIOLOGISTS:  1. Carlton Osman MD  2. Elizabeth Blas MD    ANESTHESIA: General endotracheal anesthesia with Dr Radha Billy DO      ESTIMATED BLOOD LOSS: Less than 25 mL.        FLUOROSCOPY TIME: 7:42 with an exposure of 357 mGy      CONTRAST: 50 mL       INDICATIONS:   82-year-old  female with a history of hypertension, hyperlipidemia, COPD, remote tobacco abuse, congestive heart failure and severe aortic valve stenosis status post surgical aortic valve replacement with Dr. Sebastian in 2013 who presented with worsening shortness of breath.  She was found to have severe aortic stenosis and was felt to be a reasonable candidate for TAVR. The risks and benefits of surgery were discussed with the patient including pain, bleeding, infection, renal failure, stroke, heart block and death. The patient understood these risks and wished to proceed with surgery.       DESCRIPTION OF PROCEDURE: The patient was taken to the operating room and placed under general endotracheal anesthesia. She was prepped and draped in the usual sterile fashion. A timeout was performed including the patient’s name, procedure, and antibiotic administration. Left common femoral arterial and venous lines were placed by the cardiologists for placement of a pigtail catheter and temporary venous pacer.  Needle access of the right common femoral artery over the femoral head was obtained and the incision was enlarged using a #11 blade. Systemic heparin was administered. A 7-Mosotho dilator was then placed over the wire using modified Seldinger technique and 2 Perclose devices were deployed. A 7-Mosotho sheath was then placed followed by a 14-Mosotho sheath within the abdominal aorta. This was secured using a silk suture. An AL-1 catheter was utilized to cross the aortic valve with placement of a Safari wire in the left ventricle. The 20 mm SANGITA 3 Ultra valve was placed in the correct position at the aortic annulus. The valve was deployed after rapid pacing within the previous valve and was found to be in satisfactory position with trivial paravalvular leak on echocardiogram. Aortogram was performed that revealed no aortic regurgitation. The sheath was removed and a wire was left in place.  The two Perclose devices were deployed with satisfactory hemostasis.  An aortogram was then performed that revealed intact vasculature.  The groin incision was then sealed with skin glue and the left groin pigtail and pacing wire were removed. The sheaths were left in place and the patient was extubated in the operating room and transported to the cardiac ICU in stable condition.

## 2022-07-08 NOTE — PROGRESS NOTES
Cardiothoracic Surgery Progress Note      POD # 1 s/p TAVR     LOS: 2 days      Subjective:  No acute events overnight    Objective:  Vital Signs  Temp:  [96.2 °F (35.7 °C)-97.9 °F (36.6 °C)] 97.8 °F (36.6 °C)  Heart Rate:  [60-94] 66  Resp:  [18-24] 24  BP: ()/(40-60) 121/49  Arterial Line BP: ()/(39-59) 121/45    Physical Exam:   General Appearance: alert, appears stated age and cooperative   Lungs: clear to auscultation, respirations regular, respirations even and respirations unlabored   Heart: regular rhythm & normal rate, normal S1, S2, no murmur, no gallop, no rub and no click   Skin: Incision c/d/i, groin without hematoma         Results:  Results from last 7 days   Lab Units 07/08/22  0528   WBC 10*3/mm3 9.85   HEMOGLOBIN g/dL 8.3*   HEMATOCRIT % 27.0*   PLATELETS 10*3/mm3 215     Results from last 7 days   Lab Units 07/07/22  0849   SODIUM mmol/L 141   POTASSIUM mmol/L 4.1   CHLORIDE mmol/L 108*   CO2 mmol/L 23.0   BUN mg/dL 24*   CREATININE mg/dL 0.73   GLUCOSE mg/dL 187*   CALCIUM mg/dL 8.2*       Assessment:  POD # 1 s/p TAVR    Plan:  Ambulate  DC home if all agree    Chelsea Casey PA-C  07/08/22  06:59 EDT

## 2022-07-08 NOTE — PROGRESS NOTES
"[facility]  IP Progress Note      Chief Complaint: Follow-up of diastolic CHF/valvular heart disease, status post TAVR.    Subjective   Sitting up in chair, getting ready to go for a walk.  States that she walked once already, feels great denies chest pain or shortness of breath.    Objective     Blood pressure 121/49, pulse 66, temperature 97.8 °F (36.6 °C), temperature source Axillary, resp. rate 24, height 160 cm (63\"), weight 70.3 kg (155 lb), SpO2 98 %.     Intake/Output Summary (Last 24 hours) at 7/8/2022 0730  Last data filed at 7/8/2022 0400  Gross per 24 hour   Intake 2190 ml   Output 525 ml   Net 1665 ml       Physical Exam:  General: No acute distress.   Neck: no JVD.  Chest:No respiratory distress, breath sounds are normal. No wheezes,  rhonchi or rales.  Cardiovascular: Normal S1 and S2, systolic murmur heard  Extremities: No edema.  Both groins are intact/stable.    Results Review:     I reviewed the patient's new clinical results.    Results from last 7 days   Lab Units 07/08/22  0528   WBC 10*3/mm3 9.85   HEMOGLOBIN g/dL 8.3*   HEMATOCRIT % 27.0*   PLATELETS 10*3/mm3 215     Results from last 7 days   Lab Units 07/08/22  0528 07/06/22  0817 07/05/22  1455   SODIUM mmol/L 142   < > 143   POTASSIUM mmol/L 4.5   < > 4.1   CHLORIDE mmol/L 110*   < > 108*   CO2 mmol/L 25.0   < > 25.0   BUN mg/dL 28*   < > 25*   CREATININE mg/dL 0.75   < > 1.01*   CALCIUM mg/dL 8.8   < > 8.9   BILIRUBIN mg/dL  --   --  0.7   ALK PHOS U/L  --   --  115   ALT (SGPT) U/L  --   --  52*   AST (SGOT) U/L  --   --  42*   GLUCOSE mg/dL 105*   < > 96    < > = values in this interval not displayed.     Results from last 7 days   Lab Units 07/08/22  0528   SODIUM mmol/L 142   POTASSIUM mmol/L 4.5   CHLORIDE mmol/L 110*   CO2 mmol/L 25.0   BUN mg/dL 28*   CREATININE mg/dL 0.75   GLUCOSE mg/dL 105*   CALCIUM mg/dL 8.8         Lab Results   Component Value Date    CKTOTAL 81 03/11/2015    CKMB 2.1 " 03/11/2015    CKMBINDEX 2.5 03/11/2015    TROPONINI <0.006 03/11/2015    TROPONINT <0.010 07/05/2022                   Tele: Sinus Rythym      Assessment:  1. Severe breast prosthetic valve aortic stenosis, now status post valve in valve TAVR with 20 mm tissue valve, had post implant gradient between 15 to 19 mmHg.  2. Acute on chronic diastolic CHF, improved.  3. No CAD, normal LV function.  4. Hypertension.  Controlled.  Plan:  1. Due to residual gradient patient will benefit from dual platelet therapy, will add Plavix.  2. Continue current dose of Lipitor, losartan and metoprolol.  3. Add daily Lasix 20 mg.  4. Okay to discharge to home from cardiology standpoint.  5. Follow-up with Dr. Wade and heart valve center per their recommendations.  6. Follow-up with me at [place] office in 6 weeks.    Carlton Osman MD, FACC, [facility]

## 2022-07-08 NOTE — PROGRESS NOTES
Pt. Referred for Phase II Cardiac Rehab. Staff discussed benefits of exercise, program protocol, and educational material provided. Teach back verified.  Permission granted from patient for staff to fax referral information to outlying program at this time.  Staff faxed referral info to [facility].

## 2022-07-09 NOTE — OUTREACH NOTE
Prep Survey    Flowsheet Row Responses   Restoration facility patient discharged from? [place]   Is LACE score < 7 ? No   Emergency Room discharge w/ pulse ox? No   Eligibility Readm Mgmt   Discharge diagnosis Transcatheter Aortic Valve Replacement   Does the patient have one of the following disease processes/diagnoses(primary or secondary)? Cardiothoracic surgery   Does the patient have Home health ordered? No   Is there a DME ordered? No   Prep survey completed? Yes          ANAM RUSHING - Registered Nurse

## 2022-07-14 NOTE — PROGRESS NOTES
[facility] Card[facility]    Encounter Date: 2022    Patient ID: Elizabeth Caceres is a 82 y.o. female.  : 1940     PCP: Eric Wei MD       Chief Complaint: Valvular heart disease      PROBLEM LIST:  1. Valvular heart disease:  a. Echocardiogram, 10/2013: Critical AS with SHARA 0.7 cm2 and mean gradient of 45 mmHg.   b. Abnormal Cardiolite stress test, 2013.    c. [facility], 10/23/2013, Dr. Osman: Critical AS with gradient of 101 mm across the aortic valve.  EF 70%. No significant coronary disease.    d. AVR with bioprosthetic valve, 10/2013, Dr. Sebastian.   e. Normal MPS with no evidence of ischemia. EF 80%.  f. Echocardiogram, 10/03/2017: EF 66-70%. LV diastolic dysfunction (grade I) consistent with impaired relaxation. Bioprosthetic AV. Calcification of the aortic valve mainly affecting the non and right coronary cusp(s). Mild to moderate AS. Peak gradient of 35 and a mean gradient of 20 mm Hg. SHARA 1.0cm2, probably an underestimation. Myxomatous changes of the mitral valve apparatus. Mild MR. No evidence of pericardial effusion.  g. Echocardiogram, 10/11/2018: EF > 70% with mild concentric LVH. LV diastolic dysfunction (grade I). Mild AS, bioprosthetic AV with peak gradient 30 mmHg, mean 17. No AI. Mild TR, RVSP 37 mmHg. No evidence of pericardial effusion.  h. Echocardiogram, 2021: EF 61-65%. Moderate LVH. Grade I diastolic dysfunction. Bioprosthetic AV present. Moderate to severe AS, Ao max PG 68 mmHg, Ao mean PG 36 mmHg, SHARA (I,D) 1.16 cm^2. RVSP from TR 35-45 mmHg. Mild PHTN.   i. Echocardiogram, 2022; EF 61-65%. EF 61-65%. Severe AS, max .4 mmHg, mean PG 65.3 mmHg, Ao V2 .3 cm, SHARA(I,D) 0.51 cm2. Moderate concentric LVH. Grade I diastolic dysfunction. Mild MR and TR. Moderate PHTN (50 mmHg).   j. Echocardiogram, 2022: Moderate biatrial enlargement, mild to moderate concentric hypertrophy, EF 60%.  There is a bioprosthetic aortic valve  "present with restricted leaflet mobility and severe aortic stenosis with a mean gradient of 56.7 mmHg and STJ 2.6 cm  k. LHC 5/19/2022: Angiographically near normal coronaries  l. Carotid duplex 5/19/2022: No evidence of hemodynamically significant stenosis of bilateral carotid arteries, intimal thickening and mild scattered plaque noted  2. Aortic stenosis  a. Echocardiogram, 07/06/2022; EF 55%. Mild concentric LVH. Severe aortic stenosis of the bioprosthetic valve is noted with mean gradient 61.7 mmHg and SHARA 0.43 cm². Mild AI. Moderate MR. Mild to moderate TR.  b. AXEL 07/07/2022; LVEF 45% without obvious WMA.  RV function mildly reduced, mild dilation.  Prosthetic AV with severe stenosis, SHARA 0.4 by CE, mean gradient 54mmhg, vmax 4.8m/s; DI 0.16.  Moderate AI (VC 0.4). Moderate MR with central jet. Mild TR, trace PI. No PFO, ÁNGEL clot, nor obvious baseline effusion.  Minimal nonmobile atheroma in aorta.   c. TAVR, 07/07/2022; Successful transcatheter aortic valve replacement with 20 mm [place] SANGITA tissue valve. No acute procedure-related complications.   3. Hypertension.   4. Dyslipidemia.    5. History of presyncope.   6. History of MRSA with I&D of lower extremity, 2007.   7. Vitamin D and Vitamin B12 deficiency.     History of Present Illness  Patient presents today for a 6 month follow-up with a history of valvular heart disease and cardiac risk factors. Since last visit, patient underwent TAVR 7/7/2022. Since then, she has been doing much better. Her quality of life has substantially improved. She has no complaints today. No chest pain, shortness of breath, palpitations, orthopnea, and edema.     Allergies   Allergen Reactions   • Carvedilol Nausea Only     \"INTOLERANCE\"         Current Outpatient Medications:   •  aspirin 81 MG EC tablet, Take 81 mg by mouth Daily., Disp: , Rfl:   •  atorvastatin (LIPITOR) 20 MG tablet, Take 20 mg by mouth Daily., Disp: , Rfl:   •  cholecalciferol (VITAMIN D3) 1000 units " "tablet, Take 1,000 Units by mouth Every Night., Disp: , Rfl:   •  clopidogrel (PLAVIX) 75 MG tablet, Take 1 tablet by mouth Daily., Disp: 90 tablet, Rfl: 1  •  furosemide (LASIX) 20 MG tablet, Take 1 tablet by mouth Daily., Disp: 30 tablet, Rfl: 3  •  losartan (COZAAR) 50 MG tablet, Take 1 tablet by mouth Daily., Disp: 90 tablet, Rfl: 3  •  metoprolol tartrate (LOPRESSOR) 25 MG tablet, Take 25 mg by mouth 2 (Two) Times a Day., Disp: , Rfl:   •  vitamin B-12 (CYANOCOBALAMIN) 1000 MCG tablet, Take 1,000 mcg by mouth Every Night., Disp: , Rfl:   •  vitamin C (ASCORBIC ACID) 500 MG tablet, Take 500 mg by mouth Every Night., Disp: , Rfl:     The following portions of the patient's history were reviewed and updated as appropriate: allergies, current medications, past family history, past medical history, past social history, past surgical history and problem list.    ROS  Review of Systems   Constitution: Negative for chills, fever, fatigue, generalized weakness.   Cardiovascular: Negative for chest pain, dyspnea on exertion, leg swelling, palpitations, orthopnea, and syncope.   Respiratory: Negative for cough, shortness of breath, and wheezing.  HENT: Negative for ear pain, nosebleeds, and tinnitus.  Gastrointestinal: Negative for abdominal pain, constipation, diarrhea, nausea and vomiting.   Genitourinary: No urinary symptoms.  Musculoskeletal: Negative for muscle cramps.  Neurological: Negative for dizziness, headaches, loss of balance, numbness, and symptoms of stroke.  Psychiatric: Normal mental status.     All other systems reviewed and are negative.        Objective:     /68 (BP Location: Left arm, Patient Position: Sitting)   Pulse 76   Ht 160 cm (63\")   Wt 71.8 kg (158 lb 6.4 oz)   LMP  (LMP Unknown)   SpO2 97%   BMI 28.06 kg/m²      Physical Exam  Constitutional: Patient appears well-developed and well-nourished.   HENT: HEENT exam unremarkable.   Neck: Neck supple. No JVD present. No carotid bruits. "   Cardiovascular: Normal rate, regular rhythm and normal heart sounds. No murmur heard.   2+ symmetric pulses.   Pulmonary/Chest: Breath sounds normal. Does not exhibit tenderness.   Abdominal: Abdomen benign.   Musculoskeletal: Does not exhibit edema.   Neurological: Neurological exam unremarkable.   Vitals reviewed.    Data Review:   Lab Results   Component Value Date    GLUCOSE 105 (H) 07/08/2022    BUN 28 (H) 07/08/2022    CREATININE 0.75 07/08/2022    BCR 37.3 (H) 07/08/2022    K 4.5 07/08/2022    CO2 25.0 07/08/2022    CALCIUM 8.8 07/08/2022    ALBUMIN 4.10 07/05/2022    AST 42 (H) 07/05/2022    ALT 52 (H) 07/05/2022     Lab Results   Component Value Date    CHOL 135 05/19/2022    TRIG 58 05/19/2022    HDL 72 (H) 05/19/2022    LDL 51 05/19/2022      Lab Results   Component Value Date    WBC 9.85 07/08/2022    RBC 2.86 (L) 07/08/2022    HGB 8.3 (L) 07/08/2022    HCT 27.0 (L) 07/08/2022    MCV 94.4 07/08/2022     07/08/2022     Lab Results   Component Value Date    HGBA1C 4.40 (L) 05/19/2022        Procedures             Assessment:      Diagnosis Plan   1. Valvular heart disease   severe aortic stenosis now status post TAVR.  Patient symptoms and quality of life have substantially improved.  Follow-up with CT surgery in August.    No CHF symptoms.   2. Essential hypertension   BP well controlled.  Continue current antihypertensive regimen.   3. Mixed hyperlipidemia   well-controlled with favorable HDL.  Continue statin therapy.     Plan:   Stable cardiac status.  No angina or CHF symptoms.  Continue all other current medications.   FU in 6 MO, sooner as needed.  Thank you for allowing us to participate in the care of your patient.     Silvana Boyle PA-C    Please note that portions of this note may have been completed with a voice recognition program. Efforts were made to edit the dictations, but occasionally words are mistranscribed.

## 2022-07-15 ENCOUNTER — DOCUMENTATION (OUTPATIENT)
Dept: CARDIAC REHAB | Facility: HOSPITAL | Age: 82
End: 2022-07-15

## 2022-07-15 NOTE — PROGRESS NOTES
Staff spoke with pt who stated she would love to do the program and stated it sounds like something she really needs to do. Pt stated that she would speak to her daughters about transportation and call us back.

## 2022-07-19 ENCOUNTER — OFFICE VISIT (OUTPATIENT)
Dept: CARDIOLOGY | Facility: CLINIC | Age: 82
End: 2022-07-19

## 2022-07-19 VITALS
OXYGEN SATURATION: 97 % | DIASTOLIC BLOOD PRESSURE: 68 MMHG | WEIGHT: 158.4 LBS | HEART RATE: 76 BPM | SYSTOLIC BLOOD PRESSURE: 126 MMHG | BODY MASS INDEX: 28.07 KG/M2 | HEIGHT: 63 IN

## 2022-07-19 DIAGNOSIS — E78.2 MIXED HYPERLIPIDEMIA: ICD-10-CM

## 2022-07-19 DIAGNOSIS — I38 VALVULAR HEART DISEASE: Primary | ICD-10-CM

## 2022-07-19 DIAGNOSIS — I10 ESSENTIAL HYPERTENSION: ICD-10-CM

## 2022-07-19 PROCEDURE — 99214 OFFICE O/P EST MOD 30 MIN: CPT

## 2022-07-26 ENCOUNTER — READMISSION MANAGEMENT (OUTPATIENT)
Dept: CALL CENTER | Facility: HOSPITAL | Age: 82
End: 2022-07-26

## 2022-07-26 NOTE — OUTREACH NOTE
CT Surgery Week 3 Survey    Flowsheet Row Responses   [facility] patient discharged from? [place]   Does the patient have one of the following disease processes/diagnoses(primary or secondary)? Cardiothoracic surgery   Week 3 attempt successful? Yes   Call start time 1619   Call end time 1620   Discharge diagnosis Transcatheter Aortic Valve Replacement   Is patient permission given to speak with other caregiver? Yes   List who call center can speak with dtrs   Meds reviewed with patient/caregiver? Yes   Is the patient having any side effects they believe may be caused by any medication additions or changes? No   Does the patient have all medications related to this admission filled (includes all antibiotics, pain medications, cardiac medications, etc.) Yes   Is the patient taking all medications as directed (includes completed medication regime)? Yes   Does the patient have a primary care provider?  Yes   Does the patient have an appointment scheduled with their C/T surgeon? Yes   Has the patient kept scheduled appointments due by today? Yes   Psychosocial issues? No   Comments Pt reports no issues, denies chest pain/SOA or s/sx of infection.    What is the patient's perception of their health status since discharge? Improving   Nursing interventions Nurse provided patient education   Nursing interventions Reassured on normal signs of recovery   Is the patient /caregiver able to teach back basic post-op care? Continue use of incentive spirometry at least 1 week post discharge, Practice 'cough and deep breath', Keep incision areas clean, dry and protected   Is the patient/caregiver able to teach back signs and symptoms of incisional infection? Increased redness, swelling or pain at the incisonal site, Increased drainage or bleeding   If the patient is a current smoker, are they able to teach back resources for cessation? Not a smoker   Is the patient/caregiver able to teach back the hierarchy of who to  call/visit for symptoms/problems? PCP, Specialist, Home health nurse, Urgent Care, ED, 911 Yes   Week 3 call completed? Yes          ALEXIS BLEDSOE - Registered Nurse   declines

## 2022-08-02 ENCOUNTER — CLINICAL SUPPORT (OUTPATIENT)
Dept: CARDIOLOGY | Facility: HOSPITAL | Age: 82
End: 2022-08-02

## 2022-08-02 ENCOUNTER — OFFICE VISIT (OUTPATIENT)
Dept: CARDIAC SURGERY | Facility: CLINIC | Age: 82
End: 2022-08-02

## 2022-08-02 VITALS
DIASTOLIC BLOOD PRESSURE: 76 MMHG | BODY MASS INDEX: 27.89 KG/M2 | TEMPERATURE: 98.7 F | HEART RATE: 76 BPM | SYSTOLIC BLOOD PRESSURE: 140 MMHG | HEIGHT: 63 IN | OXYGEN SATURATION: 98 % | WEIGHT: 157.4 LBS

## 2022-08-02 DIAGNOSIS — I35.0 AORTIC STENOSIS, SEVERE: Primary | ICD-10-CM

## 2022-08-02 DIAGNOSIS — Z95.2 S/P TAVR (TRANSCATHETER AORTIC VALVE REPLACEMENT): ICD-10-CM

## 2022-08-02 PROCEDURE — 99213 OFFICE O/P EST LOW 20 MIN: CPT | Performed by: NURSE PRACTITIONER

## 2022-08-02 PROCEDURE — 71046 X-RAY EXAM CHEST 2 VIEWS: CPT | Performed by: NURSE PRACTITIONER

## 2022-08-02 NOTE — PROGRESS NOTES
"     [facility] Cardiothoracic Surgery Office Follow Up Note     Date of Encounter: 08/02/2022     YOB: 1940  Name: Elizabeth Caceres    PCP: Eric Wei MD    Chief Complaint:    Chief Complaint   Patient presents with   • Hospital Follow Up Visit     Hosp follow S/p TAVR- [facility] for severe aortic valve stenosis. Pt states that \"I feels like a new person all the SOB that I had was gone as soon as I woke up\".        History of Present Illness:      Elizabeth Caceres is a 82 y.o. female with a history of HTN, HLP, COPD, former tobacco use, CHF, AS s/p bioprosthetic aortic valve replacement 2013 (Dr. Sebastian) with severe AS s/p TAVR on 7/7/22 with Dr. Wade.  Patient presents for postprocedure follow-up.  Since discharge, patient has noted significant improvement in her preoperative shortness of breath and activity intolerance.  She is feeling well.  She has followed up with Dr. Osman's office and plans for repeat TTE on 8/10/2022 in [place].    Review of Systems:  Review of Systems   Constitutional: Negative for chills, decreased appetite, diaphoresis, fever, malaise/fatigue, night sweats and weight loss.   HENT: Negative for congestion, hoarse voice, sore throat and stridor.    Cardiovascular: Negative for chest pain, claudication, dyspnea on exertion, irregular heartbeat, leg swelling, near-syncope, orthopnea, palpitations, paroxysmal nocturnal dyspnea and syncope.   Respiratory: Negative for cough, hemoptysis, shortness of breath, sleep disturbances due to breathing, snoring, sputum production and wheezing.    Hematologic/Lymphatic: Negative for adenopathy and bleeding problem. Does not bruise/bleed easily.   Skin: Negative for color change, dry skin, itching, poor wound healing and rash.   Musculoskeletal: Positive for joint pain (bilateral hands finger joints). Negative for arthritis, back pain, falls and muscle weakness.   Gastrointestinal: Negative for abdominal pain, anorexia, " "constipation, diarrhea, hematochezia, melena, nausea and vomiting.   Neurological: Negative for difficulty with concentration, disturbances in coordination, dizziness, loss of balance, numbness, seizures, vertigo and weakness.   Psychiatric/Behavioral: Negative for altered mental status, depression, memory loss and substance abuse. The patient does not have insomnia and is not nervous/anxious.    Allergic/Immunologic: Negative for persistent infections.       Allergies:  Allergies   Allergen Reactions   • Carvedilol Nausea Only     \"INTOLERANCE\"       Medications:      Current Outpatient Medications:   •  aspirin 81 MG EC tablet, Take 81 mg by mouth Daily., Disp: , Rfl:   •  atorvastatin (LIPITOR) 20 MG tablet, Take 20 mg by mouth Daily., Disp: , Rfl:   •  cholecalciferol (VITAMIN D3) 1000 units tablet, Take 1,000 Units by mouth Every Night., Disp: , Rfl:   •  clopidogrel (PLAVIX) 75 MG tablet, Take 1 tablet by mouth Daily., Disp: 90 tablet, Rfl: 1  •  furosemide (LASIX) 20 MG tablet, Take 1 tablet by mouth Daily., Disp: 30 tablet, Rfl: 3  •  losartan (COZAAR) 50 MG tablet, Take 1 tablet by mouth Daily., Disp: 90 tablet, Rfl: 3  •  metoprolol tartrate (LOPRESSOR) 25 MG tablet, Take 25 mg by mouth 2 (Two) Times a Day., Disp: , Rfl:   •  vitamin B-12 (CYANOCOBALAMIN) 1000 MCG tablet, Take 1,000 mcg by mouth Every Night., Disp: , Rfl:   •  vitamin C (ASCORBIC ACID) 500 MG tablet, Take 500 mg by mouth Every Night., Disp: , Rfl:     Social History     Socioeconomic History   • Marital status:    Tobacco Use   • Smoking status: Former Smoker     Quit date:      Years since quittin.6   • Smokeless tobacco: Never Used   Vaping Use   • Vaping Use: Never used   Substance and Sexual Activity   • Alcohol use: No   • Drug use: No   • Sexual activity: Defer       Family History   Problem Relation Age of Onset   • Cancer Father    • Heart disease Brother        Past Medical History:   Diagnosis Date   • Arthritis  " "  • Congestive heart failure due to valvular disease ([facility]) 7/5/2022   • COPD (chronic obstructive pulmonary disease) ([facility])    • Hyperlipidemia    • Hypertension    • MRSA (methicillin resistant Staphylococcus aureus) 2007    History of MRSA with I&D of lower extremity   • Urinary frequency        Past Surgical History:   Procedure Laterality Date   • AORTIC VALVE REPAIR/REPLACEMENT  10/2013    Dr. Sebastian   • AORTIC VALVE REPAIR/REPLACEMENT N/A 7/7/2022    Procedure: TRANSAORTIC TRANSCATHETER AORTIC VALVE REPLACEMENT;  Surgeon: Grayson Wade MD;  Location:  [facility];  Service: Cardiothoracic;  Laterality: N/A;  FL-7 m 42 s  Dose-357 mgy  Contrast- 50 ml isovue   • AORTIC VALVE REPAIR/REPLACEMENT N/A 7/7/2022    Procedure: Transcatheter Aortic Valve Replacement;  Surgeon: Carlton Osman MD;  Location:  [facility];  Service: Cardiovascular;  Laterality: N/A;   • APPENDECTOMY N/A 4/23/2019    Procedure: APPENDECTOMY LAPAROSCOPIC;  Surgeon: Peter Vargas MD;  Location: [facility] OR;  Service: General   • CARDIAC CATHETERIZATION Left 5/19/2022    Procedure: Left Heart Cath;  Surgeon: Carlton Osman MD;  Location:  [facility] INVASIVE LOCATION;  Service: Cardiology;  Laterality: Left;  SAME DAY AS AXEL   • CARDIAC SURGERY     • COLONOSCOPY     • HIP ARTHROPLASTY Left    • TOTAL LAPAROSCOPIC HYSTERECTOMY SALPINGO OOPHORECTOMY Right 4/23/2019    Procedure: LAPAROSCOPIC SALPINGOOPHORECTOMY;  Surgeon: Peter Vargas MD;  Location: [facility];  Service: General       I have reviewed the following portions of the patient's history: allergies, current medications, past family history, past medical history, past social history, past surgical history and problem list and confirm it's accurate.    Physical Exam:  Vital Signs:    Vitals:    08/02/22 1257   BP: 140/76   Pulse: 76   Temp: 98.7 °F (37.1 °C)   SpO2: 98%   Weight: 71.4 kg (157 lb 6.4 oz)   Height: 160 cm (63\")     Body mass index is 27.88 kg/m².     Physical " Exam  Vitals and nursing note reviewed.   Constitutional:       Appearance: Normal appearance. She is well-developed and well-groomed.   HENT:      Head: Normocephalic and atraumatic.   Cardiovascular:      Rate and Rhythm: Normal rate and regular rhythm.      Heart sounds: S1 normal and S2 normal. Murmur heard.     No friction rub.   Pulmonary:      Comments: Unlabored, Decreased left base  Musculoskeletal:      Cervical back: Neck supple.      Right lower leg: No edema.      Left lower leg: No edema.   Skin:     General: Skin is warm and dry.      Comments: Bilateral groin sites  Intact  No surrounding erythema, hematoma or induration   Neurological:      Mental Status: She is alert and oriented to person, place, and time.   Psychiatric:         Attention and Perception: Attention normal.         Mood and Affect: Mood normal.         Speech: Speech normal.         Behavior: Behavior is cooperative.         Labs/Imaging:    * Cannot find OR log *     XR Chest 2 View    Result Date: 8/2/2022   1. Status post CABG and TAVR. 2. Mild cardiac enlargement. No active disease.  This report was finalized on 8/2/2022 1:30 PM by Grayson Dominguez MD.    Personal review improved left effusion/atelectasis    XR Chest 1 View    Result Date: 7/7/2022   1. Stable small left pleural effusion and left basilar atelectasis compared to 7/5/2022. 2. Right basilar atelectasis has improved. 3. Stable cardiac enlargement.  This report was finalized on 7/7/2022 9:11 AM by Danielle Starks MD.      XR Chest 1 View    Result Date: 7/5/2022  Cardiomegaly with pulmonary venous hypertension and small bilateral pleural effusions. Bibasilar airspace disease likely represents atelectasis.  This report was finalized on 7/5/2022 2:10 PM by Nico Gabriel.         Results for orders placed during the hospital encounter of 05/19/22    Duplex Carotid Ultrasound CAR    Interpretation Summary  · No evidence of hemodynamically significant stenosis of bilateral  carotid arteries.  · Intimal thickening and mild scattered plaque is noted.      Results for orders placed during the hospital encounter of 07/05/22    Cardiac Catheterization/Vascular Study    Narrative  Please see the operative report.      Time Spent: I spent 26 minutes caring for Elizabeth on this date of service. This time includes time spent by me in the following activities: preparing for the visit, reviewing tests, obtaining and/or reviewing a separately obtained history, performing a medically appropriate examination and/or evaluation, counseling and educating the patient/family/caregiver, ordering medications, tests, or procedures, documenting information in the medical record and independently interpreting results and communicating that information with the patient/family/caregiver.     Assessment / Plan:  Diagnoses and all orders for this visit:    1. Severe AS s/p bioprothetic valve in 2013  (Primary)    2. S/P TAVR (transcatheter aortic valve replacement)     Elizabeth Caceres is a 82 y.o. female with a history of HTN, HLP, COPD, former tobacco use, CHF, AS s/p bioprosthetic aortic valve replacement 2013 (Dr. Sebastian) with severe AS s/p TAVR on 7/7/22 with Dr. Wade.  Patient is continuing to improve from a post procedure standpoint.  Significant improvement in shortness of air and activity intolerance.  Stable bilateral groins with improving left pleural effusion/atelectasis on x-ray today.  We will have patient continued structured exercise regimen as well as incentive spirometer use.  Plans for repeat TTE on 8/10/2022 she has followed up with Dr. Osman's office.  Cardiac rehab has been ordered.  We will plan to have patient to follow-up in 4 to 6 weeks in [place] clinic with chest x-ray.  At that time, if lungs continue to improve we will plan to see patient on as-needed basis.    Fransisca Dickerson, APRCATHY  [facility] Cardiothoracic Surgery

## 2022-08-03 NOTE — PROGRESS NOTES
TAVR APRN Evaluation    Elizabeth Caceres, 1940, [id number]     07/07/22    PCP: Eric Wei MD  Primary Cardiologist: Dr. Osman    TAVR Team:  1.  Victor Manuel Schilling MD  2.  Grayson Wade MD  3.  Elizabeth Blas MD  4.  Carlton Osman MD  5.  Danilo Blake MD  6.  Vineet Mullins MD    Chief Complaint: Dyspnea on Exertion    Identification: This is a 82 y.o. year old female from [address] [place] [address].    History of Present Illness: Ms. Caceres was previously referred for consideration of TAVR due to severe aortic stenosis with previous bioprosthetic aortic valve replacement in 2013 with Dr. Crawford. Patient was previously tentatively scheduled for TAVR 8/8/22 but recently admitted with near syncope, KAM; she was evaluated by Dr. Osman and Dr. Wade upon admission and urgent TAVR was expedited to be completed on 7/7/2022 . Her aortic valve indices are as follows:  SHARA 0.43cm2, Aortic valve Vmax 103mmHg, and AV mean gradient 61.7mmgHg.      Problem List:   Patient Active Problem List   Diagnosis   • Valvular heart disease   • HTN   • Vitamin D deficiency   • Vitamin C deficiency   • H/O syncope   • Hyperlipidemia   • COPD   • Severe AS s/p bioprothetic valve in 2013    • CHF    • Former tobacco use       Past Surgical History:  Past Surgical History:   Procedure Laterality Date   • AORTIC VALVE REPAIR/REPLACEMENT  10/2013    Dr. Sebastian   • APPENDECTOMY N/A 4/23/2019    Procedure: APPENDECTOMY LAPAROSCOPIC;  Surgeon: Peter Vargas MD;  Location: [facility] OR;  Service: General   • CARDIAC CATHETERIZATION Left 5/19/2022    Procedure: Left Heart Cath;  Surgeon: Carlton Osman MD;  Location:  [facility];  Service: Cardiology;  Laterality: Left;  SAME DAY AS AXEL   • CARDIAC SURGERY     • COLONOSCOPY     • HIP ARTHROPLASTY Left    • TOTAL LAPAROSCOPIC HYSTERECTOMY SALPINGO OOPHORECTOMY Right 4/23/2019    Procedure: LAPAROSCOPIC SALPINGOOPHORECTOMY;  Surgeon: Peter Vargas  MD;  Location: [facility];  Service: General       Allergies:  Carvedilol    Social History:  Social History     Socioeconomic History   • Marital status:    Tobacco Use   • Smoking status: Former Smoker     Quit date:      Years since quittin.5   • Smokeless tobacco: Never Used   Vaping Use   • Vaping Use: Never used   Substance and Sexual Activity   • Alcohol use: No   • Drug use: No   • Sexual activity: Defer       Current Medications:  No current facility-administered medications for this visit.  No current outpatient medications on file.    Facility-Administered Medications Ordered in Other Visits:   •  [MAR Hold] acetaminophen (TYLENOL) tablet 650 mg, 650 mg, Oral, Q4H PRN **OR** [MAR Hold] acetaminophen (TYLENOL) 160 MG/5ML solution 650 mg, 650 mg, Oral, Q4H PRN **OR** [MAR Hold] acetaminophen (TYLENOL) suppository 650 mg, 650 mg, Rectal, Q4H PRN, Annamarie Kim, APRN  •  [MAR Hold] aspirin EC tablet 81 mg, 81 mg, Oral, Daily, Annamarie Kim, APRN, 81 mg at 22  •  [MAR Hold] atorvastatin (LIPITOR) tablet 20 mg, 20 mg, Oral, Daily, Annamarie Kim, APRN, 20 mg at 22  •  [MAR Hold] sennosides-docusate (PERICOLACE) 8.6-50 MG per tablet 2 tablet, 2 tablet, Oral, BID PRN **AND** [MAR Hold] polyethylene glycol (MIRALAX) packet 17 g, 17 g, Oral, Daily PRN **AND** [MAR Hold] bisacodyl (DULCOLAX) EC tablet 5 mg, 5 mg, Oral, Daily PRN **AND** [MAR Hold] bisacodyl (DULCOLAX) suppository 10 mg, 10 mg, Rectal, Daily PRN, Annamarie Kim, APRN  •  [MAR Hold] cholecalciferol (VITAMIN D3) tablet 1,000 Units, 1,000 Units, Oral, Nightly, Annamarie Kim, APRN, 1,000 Units at 22  •  dexamethasone (DECADRON) injection, , Intravenous, PRN, Braulio Duque CRNA, 4 mg at 22  •  ePHEDrine injection, , Intravenous, PRN, Braulio Duque CRNA, 10 mg at 22  •  esmolol (BREVIBLOC) injection, , Intravenous, PRN, Braulio Duque CRNA, 100 mg at  07/07/22 0709  •  etomidate (AMIDATE) injection, , Intravenous, PRN, Braulio Duque CRNA, 14 mg at 07/07/22 0709  •  famotidine (PEPCID) injection 20 mg, 20 mg, Intravenous, Once, Radha Billy, DO  •  fentaNYL citrate (PF) (SUBLIMAZE) injection, , Intravenous, PRN, Braulio Duque CRNA, 100 mcg at 07/07/22 0739  •  [MAR Hold] heparin (porcine) 5000 UNIT/ML injection 5,000 Units, 5,000 Units, Subcutaneous, Q12H, Annamarie Kim, APRN, 5,000 Units at 07/06/22 2028  •  heparin (porcine) injection, , Intravenous, PRN, Braulio Duque CRNA, 5,000 Units at 07/07/22 0746  •  [MAR Hold] ipratropium-albuterol (DUO-NEB) nebulizer solution 3 mL, 3 mL, Nebulization, Q6H PRN, Annamarie Kim, APRN  •  lactated ringers infusion, 9 mL/hr, Intravenous, Continuous, Radha Billy, DO, Last Rate: 9 mL/hr at 07/07/22 0702, Currently Infusing at 07/07/22 0702  •  lidocaine PF 1% (XYLOCAINE) injection 0.5 mL, 0.5 mL, Injection, Once PRN, Radha Billy, DO  •  losartan (COZAAR) tablet 50 mg, 50 mg, Oral, Daily, Annamarie Kim, APRN, 50 mg at 07/06/22 0833  •  metoprolol tartrate (LOPRESSOR) tablet 25 mg, 25 mg, Oral, BID, Annamarie Kim, APRN, 25 mg at 07/06/22 2029  •  midazolam (VERSED) injection 0.5 mg, 0.5 mg, Intravenous, Q10 Min PRN, Radha Billy, DO  •  niCARdipine (CARDENE) injection, , Intravenous, Continuous PRN, Braulio Duque CRNA, Last Rate: 4 mL/hr at 07/07/22 0749, 10 mg/hr at 07/07/22 0749  •  [MAR Hold] ondansetron (ZOFRAN) tablet 4 mg, 4 mg, Oral, Q6H PRN **OR** [MAR Hold] ondansetron (ZOFRAN) injection 4 mg, 4 mg, Intravenous, Q6H PRN, Annamarie Kim, APRN  •  [MAR Hold] Pharmacy Consult - [facility], , Does not apply, Daily, Luh Reyes, PharmD  •  phenylephrine (AVERY-SYNEPHRINE) 50 mg in sodium chloride 0.9 % 250 mL infusion, 0.5-3 mcg/kg/min, Intravenous, Titrated, Braulio Duque, DOTTIE  •  phenylephrine (AVERY-SYNEPHRINE) 50 mg in sodium chloride 0.9 % 250 mL infusion, , Intravenous,  Continuous PRN, Braulio Duque CRNA, Stopped at 07/07/22 0749  •  Phenylephrine HCl-NaCl 100 mcg/ml injection, , Intravenous, PRN, Braulio Duque CRNA, 200 mcg at 07/07/22 0733  •  rocuronium (ZEMURON) injection, , Intravenous, PRN, Braulio Duque CRNA, 50 mg at 07/07/22 0709  •  sodium chloride (NS) irrigation solution, , , PRN, Grayson Wade MD, 1,000 mL at 07/07/22 0630  •  [MAR Hold] sodium chloride 0.9 % flush 10 mL, 10 mL, Intravenous, PRN, Fidencio Ordaz MD  •  [MAR Hold] sodium chloride 0.9 % flush 10 mL, 10 mL, Intravenous, Q12H, Annamarie Kim APRN, 10 mL at 07/06/22 2029  •  [MAR Hold] sodium chloride 0.9 % flush 10 mL, 10 mL, Intravenous, PRN, Annamarie Kim APRN  •  sodium chloride 0.9 % flush 10 mL, 10 mL, Intravenous, Q12H, Radha Billy, DO  •  sodium chloride 0.9 % flush 10 mL, 10 mL, Intravenous, PRN, Radha Billy DO  •  sodium chloride 1,000 mL with heparin (porcine) 10,000 Units mixture, , , PRN, Grayson Wade MD, 1,010 mL at 07/07/22 0630  •  [MAR Hold] vitamin B-12 (CYANOCOBALAMIN) tablet 1,000 mcg, 1,000 mcg, Oral, Nightly, Annamarie Kim APRN, 1,000 mcg at 07/06/22 2029      Diagnostic Data:  Transthoracic echo:   Adult Transthoracic Echo Complete W/ Cont if Necessary Per Protocol (07/06/2022 11:14)    Transesophageal echo:   Adult Transesophageal Echo (AXEL) W/ Cont if Necessary Per Protocol (05/19/2022 16:10)    Cardiac Cath:   Cardiac Catheterization/Vascular Study (05/19/2022 13:15)    CTA Chest, Abdomen, and Pelvis:   CT Angio TAVR Chest Abdomen Pelvis (05/19/2022 18:32)    Carotid Doppler:   Duplex Carotid Ultrasound CAR (05/19/2022 16:13)      Functional Assessment Data:    KCCQ12 Questionnaire Score: (see scanned copy). Score:23      Hopkins Basic Activities of Daily Living (ADL) Scale    Bathing (sponge bath, tub bath, or shower).  Receives either no assistance,   or assistance with bathing only on body part.   Yes    Dressing Gets clothes and  dresses without any assistance, except for  tying shoes.        Yes    Toileting Goes to toilet room, uses toilet, arranges clothes, and returns  without any assistance (may use cane or walker for support and may use  bedpan / urinal at night.      Yes    Transferring Moves into and out of bed and chair without assistance  (may use cane or walker)      Yes    Continence Controls bowel and bladder completely without occasional  accidents        No    Feeding Feeds self without assistance (except for help with cutting meat  or buttering bread)       Yes        Total: 5       Lizz-Nikolay Instrumental Activities of Daily Living Scale (IADL)    Ability to Use Telephone   · Operates telephone on own initiative.  Looks up and dials numbers, etc.         1    Shopping  · Completely unable to shop    0    Food Preparation  · Heats, serves and prepares meals, but does not maintain adequate diet.          0    Housekeeping  · Does not participate in any housekeeping tasks  0    Laundry  · All laundry must be done by others   0    Mode of Transportation       · Travels on public transportation when accompanied by another           0    Responsibility for Own Medications  · Is responsible for taking medications in correct dosages at correct time          1    Ability to Handle Finances  · Manages financial matters independently (budgets, writes checks,   pays rent, bills, goes to bank), collects and keeps track of income          1       Total Score: 3           Five Meter Walk Test    Exclusions (check all that apply)     [x] Clinically Unstable: Hospitalized; urgent TAVR       STS risk of mortality with open AVR         Creatinine Clearance: 53.49 mL/min   https://reference.[employer]/calculator/creatinine-clearance-cockcroft-gault    Assessment/ Plan:     1. Severe Aortic Stenosis  -Severe aortic stenosis in setting of bioprosthetic valve. Inidces as noted in HPI.  -LHC and carotid duplex with nonobstructive  plaque  -Previously tenetatively scheduled in August 2022  -Currently hospitalized and undergoing urgent TAVR with coordination by Dr. Osman and Dr. Wade  -Hospital management per primary team/cardiology/CT surgery  -TAVR coordinator to follow-up after procedure. Please schedule telemedicine visit with VALDEZ Hudson in approximately 1 week after discharge        VALDEZ Storey, 07/07/22, 07:50 EDT     Otc Regimen: Recommended OTC Nicotinamide Detail Level: Simple Initiate Treatment: Efudex 5 % topical cream \\nQuantity: 40.0 g  Days Supply: 30\\nSig: Apply to precancerous lesions on legs BID for 3 weeks then stop\\n\\nTreat spot on chest for 2 weeks

## 2022-08-04 ENCOUNTER — READMISSION MANAGEMENT (OUTPATIENT)
Dept: CALL CENTER | Facility: HOSPITAL | Age: 82
End: 2022-08-04

## 2022-08-04 NOTE — PROGRESS NOTES
TAVR APRN    One month post TAVR [facility] visit for KCCQ12 and 5 meter walk.  Patient just seen @ CT surgery.  She states she is feeling very much better!              Five Meter Walk Test    Elizabeth Caceres  1940  [id number]  08/03/22    Utilized Walking Aid? No     Walk 1: 10.14 s/5m     Walk 2: 14.86 s/5m     Walk 3: 9.44 s/5m    Five Meter Walk Average:  10.8 s/5m    Gait Speed: Slow (Average > 6 s/5m)    Notes: KCCQ12 score 56/70 = NYHA I      Sue Escalante, VALDEZ, 08/04/22      Next TAVR Clinic follow up is 7/2023.          [facility]

## 2022-08-04 NOTE — OUTREACH NOTE
CT Surgery Week 4 Survey    Flowsheet Row Responses   [facility] patient discharged from? [place]   Does the patient have one of the following disease processes/diagnoses(primary or secondary)? Cardiothoracic surgery   Week 4 attempt successful? Yes   Call start time 1306   Call end time 1315   Discharge diagnosis Transcatheter Aortic Valve Replacement   Person spoke with today (if not patient) and relationship blanca Singh   Meds reviewed with patient/caregiver? Yes   Is the patient having any side effects they believe may be caused by any medication additions or changes? No   Is the patient taking all medications as directed (includes completed medication regime)? Yes   Has the patient kept scheduled appointments due by today? Yes   Is the patient still receiving Home Health Services? No   Psychosocial issues? No   What is the patient's perception of their health status since discharge? Improving   Nursing interventions Nurse provided patient education   Is the patient/caregiver able to teach back normal signs of recovery? Pain or discomfort at incisional site   Nursing interventions Reassured on normal signs of recovery   Is the patient/caregiver able to teach back steps to recovery at home? Set small, achievable goals for return to baseline health, Rest and rebuild strength, gradually increase activity, Eat a well-balance diet   Is the patient /caregiver able to teach back the importance of cardiac rehab? Yes   Is the patient/caregiver able to teach back the hierarchy of who to call/visit for symptoms/problems? PCP, Specialist, Home health nurse, Urgent Care, ED, 911 Yes   Additional teach back comments daughter is out of state and had question about asking surgeon questions, advise to use MyChart to communicate with health providers, daughter has access   Week 4 Call Completed? Yes   Would the patient like one additional call? No   Graduated Yes   Is the patient interested in additional calls from  an ambulatory ?  NOTE:  applies to high risk patients requiring additional follow-up. No   Did the patient feel the follow up calls were helpful during their recovery period? Yes   Was the number of calls appropriate? Yes          YOON RUSHING - Registered Nurse

## 2022-08-10 ENCOUNTER — APPOINTMENT (OUTPATIENT)
Dept: CARDIOLOGY | Facility: HOSPITAL | Age: 82
End: 2022-08-10

## 2022-08-11 ENCOUNTER — HOSPITAL ENCOUNTER (OUTPATIENT)
Dept: CARDIOLOGY | Facility: HOSPITAL | Age: 82
Discharge: HOME | End: 2022-08-11
Admitting: NURSE PRACTITIONER

## 2022-08-11 DIAGNOSIS — I35.0 AORTIC STENOSIS, SEVERE: ICD-10-CM

## 2022-08-11 LAB
BH CV ECHO MEAS - ACS: 2 CM
BH CV ECHO MEAS - AO MAX PG: 56.1 MMHG
BH CV ECHO MEAS - AO MEAN PG: 32.5 MMHG
BH CV ECHO MEAS - AO ROOT DIAM: 3 CM
BH CV ECHO MEAS - AO V2 MAX: 374.5 CM/SEC
BH CV ECHO MEAS - AO V2 VTI: 83.9 CM
BH CV ECHO MEAS - EDV(CUBED): 66.4 ML
BH CV ECHO MEAS - EDV(MOD-SP4): 56.3 ML
BH CV ECHO MEAS - EF(MOD-SP4): 68.9 %
BH CV ECHO MEAS - ESV(CUBED): 7.8 ML
BH CV ECHO MEAS - ESV(MOD-SP4): 17.5 ML
BH CV ECHO MEAS - FS: 51.1 %
BH CV ECHO MEAS - IVS/LVPW: 1.16 CM
BH CV ECHO MEAS - IVSD: 1.48 CM
BH CV ECHO MEAS - LAT PEAK E' VEL: 5.8 CM/SEC
BH CV ECHO MEAS - LV DIASTOLIC VOL/BSA (35-75): 32.3 CM2
BH CV ECHO MEAS - LV MASS(C)D: 207.9 GRAMS
BH CV ECHO MEAS - LV MAX PG: 7.7 MMHG
BH CV ECHO MEAS - LV MEAN PG: 4.5 MMHG
BH CV ECHO MEAS - LV SYSTOLIC VOL/BSA (12-30): 10 CM2
BH CV ECHO MEAS - LV V1 MAX: 139 CM/SEC
BH CV ECHO MEAS - LV V1 VTI: 33 CM
BH CV ECHO MEAS - LVIDD: 4.1 CM
BH CV ECHO MEAS - LVIDS: 1.98 CM
BH CV ECHO MEAS - LVPWD: 1.28 CM
BH CV ECHO MEAS - MED PEAK E' VEL: 7 CM/SEC
BH CV ECHO MEAS - MV A MAX VEL: 150 CM/SEC
BH CV ECHO MEAS - MV E MAX VEL: 127 CM/SEC
BH CV ECHO MEAS - MV E/A: 0.85
BH CV ECHO MEAS - RAP SYSTOLE: 10 MMHG
BH CV ECHO MEAS - RVSP: 40.1 MMHG
BH CV ECHO MEAS - SI(MOD-SP4): 22.2 ML/M2
BH CV ECHO MEAS - SV(MOD-SP4): 38.8 ML
BH CV ECHO MEAS - TAPSE (>1.6): 3.1 CM
BH CV ECHO MEAS - TR MAX PG: 30.1 MMHG
BH CV ECHO MEAS - TR MAX VEL: 273 CM/SEC
BH CV ECHO MEASUREMENTS AVERAGE E/E' RATIO: 19.84
LEFT ATRIUM VOLUME INDEX: 23.5 ML/M2
MAXIMAL PREDICTED HEART RATE: 138 BPM
STRESS TARGET HR: 117 BPM

## 2022-08-11 PROCEDURE — 93306 TTE W/DOPPLER COMPLETE: CPT | Performed by: INTERNAL MEDICINE

## 2022-08-11 PROCEDURE — 93306 TTE W/DOPPLER COMPLETE: CPT

## 2022-08-12 ENCOUNTER — APPOINTMENT (OUTPATIENT)
Dept: CARDIOLOGY | Facility: HOSPITAL | Age: 82
End: 2022-08-12

## 2022-08-17 DIAGNOSIS — I38 VALVULAR HEART DISEASE: ICD-10-CM

## 2022-08-17 DIAGNOSIS — I35.0 AORTIC STENOSIS, SEVERE: Primary | ICD-10-CM

## 2022-08-17 NOTE — PROGRESS NOTES
Post TAVR TTE with increased aortic valve gradients.  Discussed with Dr. Osman; recommendation for apixaban 2.5 Mg every 12 hours and repeat TTE in approximately 4 - 6 weeks after anticoagulation.  Discussed plan with patient's family and receptive of medication changes, repeat AXEL.  Patient instructed to discontinue clopidogrel with the initiation of apixaban.  Orders placed for AXEL in approximately 5 weeks with Dr. Osman.

## 2022-08-24 ENCOUNTER — TELEPHONE (OUTPATIENT)
Dept: CARDIOLOGY | Facility: CLINIC | Age: 82
End: 2022-08-24

## 2022-08-24 NOTE — TELEPHONE ENCOUNTER
Patient called office inquiring about bruising she is having on her right eye.  Patient did not answer the phone. VM message left stating that Eliquis is a blood thinner and if she rubbed her eye hard she may have broken tiny blood vessels which may have led to the bruising. Instructed patient that if the bruise continues to grow bigger, or she is having vision changes, she needs to go to the ER.  Instructed patient to call back with further questions or concerns.

## 2022-09-01 ENCOUNTER — PREP FOR SURGERY (OUTPATIENT)
Dept: OTHER | Facility: HOSPITAL | Age: 82
End: 2022-09-01

## 2022-09-01 DIAGNOSIS — I38 VALVULAR HEART DISEASE: ICD-10-CM

## 2022-09-01 DIAGNOSIS — I35.0 AORTIC STENOSIS, SEVERE: Primary | ICD-10-CM

## 2022-09-01 RX ORDER — SODIUM CHLORIDE 0.9 % (FLUSH) 0.9 %
10 SYRINGE (ML) INJECTION AS NEEDED
Status: CANCELLED | OUTPATIENT
Start: 2022-09-10

## 2022-09-01 RX ORDER — SODIUM CHLORIDE 0.9 % (FLUSH) 0.9 %
10 SYRINGE (ML) INJECTION EVERY 12 HOURS SCHEDULED
Status: CANCELLED | OUTPATIENT
Start: 2022-09-10

## 2022-09-09 ENCOUNTER — APPOINTMENT (OUTPATIENT)
Dept: GENERAL RADIOLOGY | Facility: HOSPITAL | Age: 82
End: 2022-09-09

## 2022-09-09 ENCOUNTER — HOSPITAL ENCOUNTER (OUTPATIENT)
Facility: HOSPITAL | Age: 82
Setting detail: OBSERVATION
Discharge: HOME | End: 2022-09-10
Attending: STUDENT IN AN ORGANIZED HEALTH CARE EDUCATION/TRAINING PROGRAM | Admitting: INTERNAL MEDICINE

## 2022-09-09 DIAGNOSIS — R07.9 CHEST PAIN, UNSPECIFIED TYPE: Primary | ICD-10-CM

## 2022-09-09 LAB
ALBUMIN SERPL-MCNC: 4.63 G/DL (ref 3.5–5.2)
ALBUMIN/GLOB SERPL: 1.7 G/DL
ALP SERPL-CCNC: 107 U/L (ref 39–117)
ALT SERPL W P-5'-P-CCNC: 14 U/L (ref 1–33)
ANION GAP SERPL CALCULATED.3IONS-SCNC: 20.6 MMOL/L (ref 5–15)
AST SERPL-CCNC: 27 U/L (ref 1–32)
BASOPHILS # BLD AUTO: 0.04 10*3/MM3 (ref 0–0.2)
BASOPHILS NFR BLD AUTO: 0.6 % (ref 0–1.5)
BILIRUB SERPL-MCNC: 0.5 MG/DL (ref 0–1.2)
BUN SERPL-MCNC: 22 MG/DL (ref 8–23)
BUN/CREAT SERPL: 23.2 (ref 7–25)
CALCIUM SPEC-SCNC: 9.5 MG/DL (ref 8.6–10.5)
CHLORIDE SERPL-SCNC: 106 MMOL/L (ref 98–107)
CO2 SERPL-SCNC: 16.4 MMOL/L (ref 22–29)
CREAT SERPL-MCNC: 0.95 MG/DL (ref 0.57–1)
DEPRECATED RDW RBC AUTO: 51.7 FL (ref 37–54)
EGFRCR SERPLBLD CKD-EPI 2021: 59.9 ML/MIN/1.73
EOSINOPHIL # BLD AUTO: 0.17 10*3/MM3 (ref 0–0.4)
EOSINOPHIL NFR BLD AUTO: 2.7 % (ref 0.3–6.2)
ERYTHROCYTE [DISTWIDTH] IN BLOOD BY AUTOMATED COUNT: 14.1 % (ref 12.3–15.4)
FLUAV SUBTYP SPEC NAA+PROBE: NOT DETECTED
FLUBV RNA ISLT QL NAA+PROBE: NOT DETECTED
GLOBULIN UR ELPH-MCNC: 2.8 GM/DL
GLUCOSE BLDC GLUCOMTR-MCNC: 102 MG/DL (ref 70–130)
GLUCOSE SERPL-MCNC: 102 MG/DL (ref 65–99)
HCT VFR BLD AUTO: 32.1 % (ref 34–46.6)
HGB BLD-MCNC: 9.1 G/DL (ref 12–15.9)
HOLD SPECIMEN: NORMAL
HOLD SPECIMEN: NORMAL
HYPOCHROMIA BLD QL: NORMAL
IMM GRANULOCYTES # BLD AUTO: 0.02 10*3/MM3 (ref 0–0.05)
IMM GRANULOCYTES NFR BLD AUTO: 0.3 % (ref 0–0.5)
LYMPHOCYTES # BLD AUTO: 1.55 10*3/MM3 (ref 0.7–3.1)
LYMPHOCYTES NFR BLD AUTO: 24.8 % (ref 19.6–45.3)
MACROCYTES BLD QL SMEAR: NORMAL
MAGNESIUM SERPL-MCNC: 2.1 MG/DL (ref 1.6–2.4)
MCH RBC QN AUTO: 29 PG (ref 26.6–33)
MCHC RBC AUTO-ENTMCNC: 28.3 G/DL (ref 31.5–35.7)
MCV RBC AUTO: 102.2 FL (ref 79–97)
MONOCYTES # BLD AUTO: 0.48 10*3/MM3 (ref 0.1–0.9)
MONOCYTES NFR BLD AUTO: 7.7 % (ref 5–12)
NEUTROPHILS NFR BLD AUTO: 3.99 10*3/MM3 (ref 1.7–7)
NEUTROPHILS NFR BLD AUTO: 63.9 % (ref 42.7–76)
NRBC BLD AUTO-RTO: 0 /100 WBC (ref 0–0.2)
NT-PROBNP SERPL-MCNC: 972.9 PG/ML (ref 0–1800)
PLAT MORPH BLD: NORMAL
PLATELET # BLD AUTO: 167 10*3/MM3 (ref 140–450)
PMV BLD AUTO: 9.7 FL (ref 6–12)
POTASSIUM SERPL-SCNC: 4.8 MMOL/L (ref 3.5–5.2)
PROT SERPL-MCNC: 7.4 G/DL (ref 6–8.5)
RBC # BLD AUTO: 3.14 10*6/MM3 (ref 3.77–5.28)
SARS-COV-2 RNA PNL SPEC NAA+PROBE: NOT DETECTED
SODIUM SERPL-SCNC: 143 MMOL/L (ref 136–145)
TROPONIN T SERPL-MCNC: <0.01 NG/ML (ref 0–0.03)
TSH SERPL DL<=0.05 MIU/L-ACNC: 2.85 UIU/ML (ref 0.27–4.2)
WBC NRBC COR # BLD: 6.25 10*3/MM3 (ref 3.4–10.8)
WHOLE BLOOD HOLD SPECIMEN: NORMAL

## 2022-09-09 PROCEDURE — 84443 ASSAY THYROID STIM HORMONE: CPT | Performed by: PHYSICIAN ASSISTANT

## 2022-09-09 PROCEDURE — 84484 ASSAY OF TROPONIN QUANT: CPT | Performed by: STUDENT IN AN ORGANIZED HEALTH CARE EDUCATION/TRAINING PROGRAM

## 2022-09-09 PROCEDURE — 80053 COMPREHEN METABOLIC PANEL: CPT | Performed by: STUDENT IN AN ORGANIZED HEALTH CARE EDUCATION/TRAINING PROGRAM

## 2022-09-09 PROCEDURE — 71045 X-RAY EXAM CHEST 1 VIEW: CPT

## 2022-09-09 PROCEDURE — 93005 ELECTROCARDIOGRAM TRACING: CPT | Performed by: STUDENT IN AN ORGANIZED HEALTH CARE EDUCATION/TRAINING PROGRAM

## 2022-09-09 PROCEDURE — 84484 ASSAY OF TROPONIN QUANT: CPT | Performed by: INTERNAL MEDICINE

## 2022-09-09 PROCEDURE — 93010 ELECTROCARDIOGRAM REPORT: CPT | Performed by: INTERNAL MEDICINE

## 2022-09-09 PROCEDURE — G0378 HOSPITAL OBSERVATION PER HR: HCPCS

## 2022-09-09 PROCEDURE — C9803 HOPD COVID-19 SPEC COLLECT: HCPCS

## 2022-09-09 PROCEDURE — 83735 ASSAY OF MAGNESIUM: CPT | Performed by: INTERNAL MEDICINE

## 2022-09-09 PROCEDURE — 93005 ELECTROCARDIOGRAM TRACING: CPT | Performed by: INTERNAL MEDICINE

## 2022-09-09 PROCEDURE — 94761 N-INVAS EAR/PLS OXIMETRY MLT: CPT

## 2022-09-09 PROCEDURE — 99220 PR INITIAL OBSERVATION CARE/DAY 70 MINUTES: CPT | Performed by: PHYSICIAN ASSISTANT

## 2022-09-09 PROCEDURE — 87636 SARSCOV2 & INF A&B AMP PRB: CPT | Performed by: NURSE PRACTITIONER

## 2022-09-09 PROCEDURE — 85007 BL SMEAR W/DIFF WBC COUNT: CPT | Performed by: STUDENT IN AN ORGANIZED HEALTH CARE EDUCATION/TRAINING PROGRAM

## 2022-09-09 PROCEDURE — 99284 EMERGENCY DEPT VISIT MOD MDM: CPT

## 2022-09-09 PROCEDURE — 85025 COMPLETE CBC W/AUTO DIFF WBC: CPT | Performed by: STUDENT IN AN ORGANIZED HEALTH CARE EDUCATION/TRAINING PROGRAM

## 2022-09-09 PROCEDURE — 83880 ASSAY OF NATRIURETIC PEPTIDE: CPT | Performed by: NURSE PRACTITIONER

## 2022-09-09 PROCEDURE — 82962 GLUCOSE BLOOD TEST: CPT

## 2022-09-09 PROCEDURE — 94799 UNLISTED PULMONARY SVC/PX: CPT

## 2022-09-09 RX ORDER — ACETAMINOPHEN 325 MG/1
650 TABLET ORAL EVERY 6 HOURS PRN
Status: DISCONTINUED | OUTPATIENT
Start: 2022-09-09 | End: 2022-09-10 | Stop reason: HOSPADM

## 2022-09-09 RX ORDER — CHOLECALCIFEROL (VITAMIN D3) 125 MCG
10 CAPSULE ORAL NIGHTLY PRN
Status: DISCONTINUED | OUTPATIENT
Start: 2022-09-09 | End: 2022-09-10 | Stop reason: HOSPADM

## 2022-09-09 RX ORDER — SODIUM CHLORIDE 0.9 % (FLUSH) 0.9 %
10 SYRINGE (ML) INJECTION AS NEEDED
Status: DISCONTINUED | OUTPATIENT
Start: 2022-09-09 | End: 2022-09-10 | Stop reason: HOSPADM

## 2022-09-09 RX ORDER — IPRATROPIUM BROMIDE AND ALBUTEROL SULFATE 2.5; .5 MG/3ML; MG/3ML
3 SOLUTION RESPIRATORY (INHALATION) EVERY 4 HOURS PRN
Status: DISCONTINUED | OUTPATIENT
Start: 2022-09-09 | End: 2022-09-10 | Stop reason: HOSPADM

## 2022-09-09 RX ORDER — HYDROXYZINE HYDROCHLORIDE 25 MG/1
25 TABLET, FILM COATED ORAL 3 TIMES DAILY PRN
Status: DISCONTINUED | OUTPATIENT
Start: 2022-09-09 | End: 2022-09-10 | Stop reason: HOSPADM

## 2022-09-09 RX ORDER — CALCIUM CARBONATE 200(500)MG
2 TABLET,CHEWABLE ORAL 3 TIMES DAILY PRN
Status: DISCONTINUED | OUTPATIENT
Start: 2022-09-09 | End: 2022-09-10 | Stop reason: HOSPADM

## 2022-09-09 RX ADMIN — APIXABAN 2.5 MG: 2.5 TABLET, FILM COATED ORAL at 23:58

## 2022-09-09 NOTE — ED PROVIDER NOTES
Subjective       She is 82-year-old white female presents emergency room today with complaint of chest discomfort and near syncope.  Patient reports he was at home and helping her daughter give the dog a bath. States walking down the [place] to her couch she became light headed and slightly nauseated. States she felt some short of breath. Reports her daughter left but as she rested she didn't feel much better so she called her to bring her to the ER.  Patient reports symptoms have somewhat improved but states she still has slight chest pressure.  Patient does have a history of valve replacement in 2013 and another valve replacement of July per Dr. Wade at [facility]. Reports she's on eliquis and aspirin and has taken both today.      Chest Pain  Pain location:  Substernal area and L chest  Pain quality: pressure    Pain radiates to:  Does not radiate  Pain severity:  Moderate  Onset quality:  Sudden  Duration:  4 hours  Timing:  Constant  Progression:  Improving  Chronicity:  New  Context: not breathing, not intercourse, not at rest and not stress    Relieved by:  Nothing  Worsened by:  Nothing  Ineffective treatments:  Rest  Associated symptoms: nausea and shortness of breath    Associated symptoms: no abdominal pain, no AICD problem, no altered mental status and no anorexia        Review of Systems   Constitutional: Negative.    HENT: Negative.    Eyes: Negative.    Respiratory: Positive for shortness of breath.    Cardiovascular: Positive for chest pain.   Gastrointestinal: Positive for nausea. Negative for abdominal pain and anorexia.   Endocrine: Negative.    Genitourinary: Negative.    Musculoskeletal: Negative.    Skin: Negative.    Allergic/Immunologic: Negative.    Neurological: Negative.    Hematological: Negative.    Psychiatric/Behavioral: Negative.        Past Medical History:   Diagnosis Date   • Arthritis    • Congestive heart failure due to valvular disease (HCC) 7/5/2022   • COPD (chronic  "obstructive pulmonary disease) (HCC)    • Hyperlipidemia    • Hypertension    • MRSA (methicillin resistant Staphylococcus aureus)     History of MRSA with I&D of lower extremity   • Urinary frequency        Allergies   Allergen Reactions   • Carvedilol Nausea Only     \"INTOLERANCE\"       Past Surgical History:   Procedure Laterality Date   • AORTIC VALVE REPAIR/REPLACEMENT  10/2013    Dr. Sebastian   • AORTIC VALVE REPAIR/REPLACEMENT N/A 2022    Procedure: TRANSAORTIC TRANSCATHETER AORTIC VALVE REPLACEMENT;  Surgeon: Grayson Wade MD;  Location:  [facility];  Service: Cardiothoracic;  Laterality: N/A;  FL-7 m 42 s  Dose-357 mgy  Contrast- 50 ml isovue   • AORTIC VALVE REPAIR/REPLACEMENT N/A 2022    Procedure: Transcatheter Aortic Valve Replacement;  Surgeon: Carlton Osman MD;  Location:  [facility];  Service: Cardiovascular;  Laterality: N/A;   • APPENDECTOMY N/A 2019    Procedure: APPENDECTOMY LAPAROSCOPIC;  Surgeon: Peter Vargas MD;  Location: [facility];  Service: General   • CARDIAC CATHETERIZATION Left 2022    Procedure: Left Heart Cath;  Surgeon: Carlton Osman MD;  Location:  [facility];  Service: Cardiology;  Laterality: Left;  SAME DAY AS AXEL   • CARDIAC SURGERY     • COLONOSCOPY     • HIP ARTHROPLASTY Left    • TOTAL LAPAROSCOPIC HYSTERECTOMY SALPINGO OOPHORECTOMY Right 2019    Procedure: LAPAROSCOPIC SALPINGOOPHORECTOMY;  Surgeon: Peter Vargas MD;  Location: [facility];  Service: General       Family History   Problem Relation Age of Onset   • Cancer Father    • Heart disease Brother        Social History     Socioeconomic History   • Marital status:    Tobacco Use   • Smoking status: Former Smoker     Quit date:      Years since quittin.7   • Smokeless tobacco: Never Used   Vaping Use   • Vaping Use: Never used   Substance and Sexual Activity   • Alcohol use: No   • Drug use: No   • Sexual activity: Defer           Objective "   Physical Exam  Vitals and nursing note reviewed.   Constitutional:       Appearance: She is well-developed.   HENT:      Head: Normocephalic.      Right Ear: External ear normal.      Left Ear: External ear normal.   Eyes:      Conjunctiva/sclera: Conjunctivae normal.      Pupils: Pupils are equal, round, and reactive to light.   Cardiovascular:      Rate and Rhythm: Normal rate and regular rhythm.      Heart sounds: Normal heart sounds.   Pulmonary:      Effort: Pulmonary effort is normal.      Breath sounds: Normal breath sounds.   Abdominal:      General: Bowel sounds are normal.      Palpations: Abdomen is soft.   Musculoskeletal:         General: Normal range of motion.      Cervical back: Normal range of motion and neck supple.   Skin:     General: Skin is warm and dry.      Capillary Refill: Capillary refill takes less than 2 seconds.   Neurological:      Mental Status: She is alert and oriented to person, place, and time.   Psychiatric:         Behavior: Behavior normal.         Thought Content: Thought content normal.         Procedures           ED Course  ED Course as of 09/09/22 2012   Fri Sep 09, 2022   1821 EKG noted atrial fibrillation.  104 bpm.  QTc 449.  No acute ST elevation [SF]   2011 HEART Score for Major Cardiac Events - MDCalc  Calculated on Sep 09 2022 8:11 PM  5 points -> Moderate Score (4-6 points) Risk of MACE of 12-16.6%. [LUKAS]      ED Course User Index  [LUKAS] Jose Pereira APRN  [SF] Nico Cortes,                                            [facility]    Final diagnoses:   Chest pain, unspecified type       ED Disposition  ED Disposition     ED Disposition   Decision to Admit    Condition   --    Comment   --             No follow-up provider specified.       Medication List      No changes were made to your prescriptions during this visit.          Jose Pereira APRN  09/09/22 2012

## 2022-09-10 ENCOUNTER — APPOINTMENT (OUTPATIENT)
Dept: ULTRASOUND IMAGING | Facility: HOSPITAL | Age: 82
End: 2022-09-10

## 2022-09-10 ENCOUNTER — APPOINTMENT (OUTPATIENT)
Dept: CT IMAGING | Facility: HOSPITAL | Age: 82
End: 2022-09-10

## 2022-09-10 VITALS
RESPIRATION RATE: 18 BRPM | WEIGHT: 160 LBS | OXYGEN SATURATION: 98 % | SYSTOLIC BLOOD PRESSURE: 135 MMHG | DIASTOLIC BLOOD PRESSURE: 64 MMHG | BODY MASS INDEX: 28.35 KG/M2 | TEMPERATURE: 98 F | HEART RATE: 70 BPM | HEIGHT: 63 IN

## 2022-09-10 LAB
ALBUMIN SERPL-MCNC: 3.86 G/DL (ref 3.5–5.2)
ALBUMIN/GLOB SERPL: 1.5 G/DL
ALP SERPL-CCNC: 91 U/L (ref 39–117)
ALT SERPL W P-5'-P-CCNC: 11 U/L (ref 1–33)
ANION GAP SERPL CALCULATED.3IONS-SCNC: 11.6 MMOL/L (ref 5–15)
AST SERPL-CCNC: 16 U/L (ref 1–32)
BASOPHILS # BLD AUTO: 0.02 10*3/MM3 (ref 0–0.2)
BASOPHILS NFR BLD AUTO: 0.4 % (ref 0–1.5)
BILIRUB SERPL-MCNC: 0.9 MG/DL (ref 0–1.2)
BUN SERPL-MCNC: 18 MG/DL (ref 8–23)
BUN/CREAT SERPL: 25.7 (ref 7–25)
CALCIUM SPEC-SCNC: 9 MG/DL (ref 8.6–10.5)
CHLORIDE SERPL-SCNC: 105 MMOL/L (ref 98–107)
CHOLEST SERPL-MCNC: 112 MG/DL (ref 0–200)
CO2 SERPL-SCNC: 23.4 MMOL/L (ref 22–29)
CREAT SERPL-MCNC: 0.7 MG/DL (ref 0.57–1)
DEPRECATED RDW RBC AUTO: 47.8 FL (ref 37–54)
EGFRCR SERPLBLD CKD-EPI 2021: 86.5 ML/MIN/1.73
EOSINOPHIL # BLD AUTO: 0.17 10*3/MM3 (ref 0–0.4)
EOSINOPHIL NFR BLD AUTO: 3.2 % (ref 0.3–6.2)
ERYTHROCYTE [DISTWIDTH] IN BLOOD BY AUTOMATED COUNT: 14 % (ref 12.3–15.4)
FOLATE SERPL-MCNC: 19.1 NG/ML (ref 4.78–24.2)
GLOBULIN UR ELPH-MCNC: 2.6 GM/DL
GLUCOSE SERPL-MCNC: 99 MG/DL (ref 65–99)
HBA1C MFR BLD: 4.9 % (ref 4.8–5.6)
HCT VFR BLD AUTO: 27.5 % (ref 34–46.6)
HDLC SERPL-MCNC: 47 MG/DL (ref 40–60)
HGB BLD-MCNC: 8.3 G/DL (ref 12–15.9)
IMM GRANULOCYTES # BLD AUTO: 0.01 10*3/MM3 (ref 0–0.05)
IMM GRANULOCYTES NFR BLD AUTO: 0.2 % (ref 0–0.5)
LDLC SERPL CALC-MCNC: 49 MG/DL (ref 0–100)
LDLC/HDLC SERPL: 1.05 {RATIO}
LYMPHOCYTES # BLD AUTO: 1.57 10*3/MM3 (ref 0.7–3.1)
LYMPHOCYTES NFR BLD AUTO: 29.6 % (ref 19.6–45.3)
MAGNESIUM SERPL-MCNC: 2.2 MG/DL (ref 1.6–2.4)
MCH RBC QN AUTO: 28.2 PG (ref 26.6–33)
MCHC RBC AUTO-ENTMCNC: 30.2 G/DL (ref 31.5–35.7)
MCV RBC AUTO: 93.5 FL (ref 79–97)
MONOCYTES # BLD AUTO: 0.42 10*3/MM3 (ref 0.1–0.9)
MONOCYTES NFR BLD AUTO: 7.9 % (ref 5–12)
NEUTROPHILS NFR BLD AUTO: 3.11 10*3/MM3 (ref 1.7–7)
NEUTROPHILS NFR BLD AUTO: 58.7 % (ref 42.7–76)
NRBC BLD AUTO-RTO: 0 /100 WBC (ref 0–0.2)
PLATELET # BLD AUTO: 184 10*3/MM3 (ref 140–450)
PMV BLD AUTO: 9.9 FL (ref 6–12)
POTASSIUM SERPL-SCNC: 3.5 MMOL/L (ref 3.5–5.2)
PROT SERPL-MCNC: 6.5 G/DL (ref 6–8.5)
RBC # BLD AUTO: 2.94 10*6/MM3 (ref 3.77–5.28)
SODIUM SERPL-SCNC: 140 MMOL/L (ref 136–145)
TRIGL SERPL-MCNC: 78 MG/DL (ref 0–150)
TROPONIN T SERPL-MCNC: <0.01 NG/ML (ref 0–0.03)
TROPONIN T SERPL-MCNC: <0.01 NG/ML (ref 0–0.03)
VIT B12 BLD-MCNC: 993 PG/ML (ref 211–946)
VLDLC SERPL-MCNC: 16 MG/DL (ref 5–40)
WBC NRBC COR # BLD: 5.3 10*3/MM3 (ref 3.4–10.8)

## 2022-09-10 PROCEDURE — 84484 ASSAY OF TROPONIN QUANT: CPT | Performed by: INTERNAL MEDICINE

## 2022-09-10 PROCEDURE — G0378 HOSPITAL OBSERVATION PER HR: HCPCS

## 2022-09-10 PROCEDURE — 82607 VITAMIN B-12: CPT | Performed by: PHYSICIAN ASSISTANT

## 2022-09-10 PROCEDURE — 70450 CT HEAD/BRAIN W/O DYE: CPT

## 2022-09-10 PROCEDURE — 99217 PR OBSERVATION CARE DISCHARGE MANAGEMENT: CPT

## 2022-09-10 PROCEDURE — 94761 N-INVAS EAR/PLS OXIMETRY MLT: CPT

## 2022-09-10 PROCEDURE — 82746 ASSAY OF FOLIC ACID SERUM: CPT | Performed by: PHYSICIAN ASSISTANT

## 2022-09-10 PROCEDURE — 93010 ELECTROCARDIOGRAM REPORT: CPT | Performed by: INTERNAL MEDICINE

## 2022-09-10 PROCEDURE — 93005 ELECTROCARDIOGRAM TRACING: CPT | Performed by: PHYSICIAN ASSISTANT

## 2022-09-10 PROCEDURE — 94799 UNLISTED PULMONARY SVC/PX: CPT

## 2022-09-10 PROCEDURE — 93880 EXTRACRANIAL BILAT STUDY: CPT

## 2022-09-10 PROCEDURE — 83735 ASSAY OF MAGNESIUM: CPT | Performed by: PHYSICIAN ASSISTANT

## 2022-09-10 PROCEDURE — 83036 HEMOGLOBIN GLYCOSYLATED A1C: CPT | Performed by: PHYSICIAN ASSISTANT

## 2022-09-10 PROCEDURE — 85025 COMPLETE CBC W/AUTO DIFF WBC: CPT | Performed by: PHYSICIAN ASSISTANT

## 2022-09-10 PROCEDURE — 80053 COMPREHEN METABOLIC PANEL: CPT | Performed by: PHYSICIAN ASSISTANT

## 2022-09-10 PROCEDURE — 80061 LIPID PANEL: CPT | Performed by: PHYSICIAN ASSISTANT

## 2022-09-10 RX ORDER — FUROSEMIDE 20 MG/1
20 TABLET ORAL DAILY
Status: CANCELLED | OUTPATIENT
Start: 2022-09-10

## 2022-09-10 RX ORDER — LANOLIN ALCOHOL/MO/W.PET/CERES
1000 CREAM (GRAM) TOPICAL NIGHTLY
Status: CANCELLED | OUTPATIENT
Start: 2022-09-10

## 2022-09-10 RX ORDER — MELATONIN
1000 NIGHTLY
Status: CANCELLED | OUTPATIENT
Start: 2022-09-10

## 2022-09-10 RX ORDER — ATORVASTATIN CALCIUM 20 MG/1
20 TABLET, FILM COATED ORAL DAILY
Status: CANCELLED | OUTPATIENT
Start: 2022-09-10

## 2022-09-10 RX ORDER — ASPIRIN 81 MG/1
81 TABLET ORAL DAILY
Status: CANCELLED | OUTPATIENT
Start: 2022-09-10

## 2022-09-10 RX ORDER — LOSARTAN POTASSIUM 50 MG/1
50 TABLET ORAL DAILY
Status: CANCELLED | OUTPATIENT
Start: 2022-09-10

## 2022-09-10 RX ORDER — ASCORBIC ACID 500 MG
500 TABLET ORAL NIGHTLY
Status: CANCELLED | OUTPATIENT
Start: 2022-09-10

## 2022-09-10 RX ADMIN — Medication 10 ML: at 11:21

## 2022-09-10 RX ADMIN — APIXABAN 2.5 MG: 2.5 TABLET, FILM COATED ORAL at 11:19

## 2022-09-10 NOTE — PLAN OF CARE
Goal Outcome Evaluation:  Problem: Adult Inpatient Plan of Care  Goal: Absence of Hospital-Acquired Illness or Injury  Intervention: Prevent Skin Injury  Recent Flowsheet Documentation  Taken 9/9/2022 2101 by Cristy Huizar RN  Body Position:   position changed independently   supine  Skin Protection:   adhesive use limited   incontinence pads utilized     Problem: Adult Inpatient Plan of Care  Goal: Absence of Hospital-Acquired Illness or Injury  Intervention: Prevent Infection  Recent Flowsheet Documentation  Taken 9/10/2022 0100 by Cristy Huizar RN  Infection Prevention:   rest/sleep promoted   environmental surveillance performed  Taken 9/9/2022 2311 by Cristy Huizar RN  Infection Prevention:   rest/sleep promoted   environmental surveillance performed  Taken 9/9/2022 2101 by Cristy Huizar RN  Infection Prevention:   environmental surveillance performed   rest/sleep promoted

## 2022-09-10 NOTE — PLAN OF CARE
Problem: Adult Inpatient Plan of Care  Goal: Optimal Comfort and Wellbeing  Intervention: Provide Person-Centered Care  Recent Flowsheet Documentation  Taken 9/9/2022 2101 by Cristy Huizar RN  Trust Relationship/Rapport:   care explained   choices provided   reassurance provided   thoughts/feelings acknowledged   Goal Outcome Evaluation:

## 2022-09-10 NOTE — DISCHARGE INSTR - APPOINTMENTS
Dr boyle  office  closed on  the  weekend  will call on  Monday  and  get  apointment  and  call pt   with  apointment  pt  has  an  ap[ian henry  cardiothoriac  marco warren  for  sept 21  at 2 pm

## 2022-09-10 NOTE — PROGRESS NOTES
Ekg ordered for 1200 on 9/10 cancelled per Dr. Escoto.  She stated the patient was going home and EKg could be cancelled under her.  EKG order covered EKG's previously done so I did not cancel the order (I did not want to cancel any EKG that were already done under this order set)

## 2022-09-10 NOTE — H&P
[facility]  HISTORY & PHYSICAL    Patient Identification:  Name:  Elizabeth Caceres  Age:  82 y.o.  Sex:  female  :  1940  MRN:  [id number]   Visit Number:  [id number]  Admit Date: 2022   Primary Care Physician:  Eric Wei MD     Subjective     Chief complaint:   Chief Complaint   Patient presents with   • Chest Pain     History of presenting illness:   Patient is a 82 y.o. female with past medical history significant for severe aortic stenosis status post bioprosthetic valve and TAVR, essential hypertension, hyperlipidemia, chronic diastolic CHF, COPD, that presented to the [facility] emergency department for evaluation of chest pain and presyncope.     The patient states she was giving her dog a bath this evening when she developed a substernal chest tightness that was associated with nausea.  She reports she was walking around the house when she felt dizzy and lightheaded.  She sat down on the couch and states the chest discomfort did not improve despite resting for 30-40 minutes.  As a result she called her daughter who came over to her house and then called EMS to bring her to the emergency department.  She denies ever having chest pain like this before and also denies any cardiac history other than a prior bioprosthetic aortic valve in  and a recent TAVR in 2022. She has no significant family cardiac history. She denies taking any aspirin or nitroglycerin at home.  She admits to having a stress test in the past that was unremarkable.  It should be of note that she recently had a left heart cath on 2022 but the results are not available. She also had a left heart cath in 2022 with on significant coronary artery disease noted.     Patient did not receive any medications in the emergency department.    Patient has been admitted to the telemetry floor as an observation patient for further evaluation and  treatment.  ---------------------------------------------------------------------------------------------------------------------   Review of Systems   Constitutional: Negative for chills, diaphoresis, fatigue and fever.   HENT: Negative for congestion, sinus pain and sore throat.    Respiratory: Negative for cough, shortness of breath and wheezing.    Cardiovascular: Positive for chest pain. Negative for palpitations.   Gastrointestinal: Positive for nausea. Negative for abdominal pain, constipation, diarrhea and vomiting.   Genitourinary: Negative for dysuria and frequency.   Musculoskeletal: Negative for gait problem.   Skin: Negative for wound.   Allergic/Immunologic: Negative for environmental allergies and immunocompromised state.   Neurological: Positive for dizziness and light-headedness. Negative for syncope, facial asymmetry, speech difficulty, weakness and headaches.   Psychiatric/Behavioral: Negative for confusion.      ---------------------------------------------------------------------------------------------------------------------   Past Medical History:   Diagnosis Date   • Arthritis    • Congestive heart failure due to valvular disease ([facility]) 7/5/2022   • COPD (chronic obstructive pulmonary disease) ([facility])    • Hyperlipidemia    • Hypertension    • MRSA (methicillin resistant Staphylococcus aureus) 2007    History of MRSA with I&D of lower extremity   • Urinary frequency      Past Surgical History:   Procedure Laterality Date   • AORTIC VALVE REPAIR/REPLACEMENT  10/2013    Dr. Sebastian   • AORTIC VALVE REPAIR/REPLACEMENT N/A 7/7/2022    Procedure: TRANSAORTIC TRANSCATHETER AORTIC VALVE REPLACEMENT;  Surgeon: Grayson Wade MD;  Location: [facility];  Service: Cardiothoracic;  Laterality: N/A;  FL-7 m 42 s  Dose-357 mgy  Contrast- 50 ml isovue   • AORTIC VALVE REPAIR/REPLACEMENT N/A 7/7/2022    Procedure: Transcatheter Aortic Valve Replacement;  Surgeon: Carlton Osman MD;  Location: [facility];  Service: Cardiovascular;  Laterality: N/A;   • APPENDECTOMY N/A 2019    Procedure: APPENDECTOMY LAPAROSCOPIC;  Surgeon: Peter Vargas MD;  Location:  [facility];  Service: General   • CARDIAC CATHETERIZATION Left 2022    Procedure: Left Heart Cath;  Surgeon: Carlton Osman MD;  Location:  [facility];  Service: Cardiology;  Laterality: Left;  SAME DAY AS AXEL   • CARDIAC SURGERY     • COLONOSCOPY     • HIP ARTHROPLASTY Left    • TOTAL LAPAROSCOPIC HYSTERECTOMY SALPINGO OOPHORECTOMY Right 2019    Procedure: LAPAROSCOPIC SALPINGOOPHORECTOMY;  Surgeon: Peter Vargas MD;  Location:  [facility];  Service: General     Family History   Problem Relation Age of Onset   • Cancer Father    • Heart disease Brother      Social History     Socioeconomic History   • Marital status:    Tobacco Use   • Smoking status: Former Smoker     Quit date:      Years since quittin.7   • Smokeless tobacco: Never Used   Vaping Use   • Vaping Use: Never used   Substance and Sexual Activity   • Alcohol use: No   • Drug use: No   • Sexual activity: Defer     ---------------------------------------------------------------------------------------------------------------------   Allergies:  Carvedilol  ---------------------------------------------------------------------------------------------------------------------   Medications below are reported home medications pulling from within the system; at this time, these medications have not been reconciled unless otherwise specified and are in the verification process for further verifcation as current home medications.    Prior to Admission Medications     Prescriptions Last Dose Informant Patient Reported? Taking?    apixaban (ELIQUIS) 2.5 MG tablet tablet   No No    Take 1 tablet by mouth Every 12 (Twelve) Hours.    aspirin 81 MG EC tablet   Yes No    Take 81 mg by mouth Daily.    atorvastatin (LIPITOR) 20 MG tablet  Medication Bottle Yes No     Take 20 mg by mouth Daily.    cholecalciferol (VITAMIN D3) 1000 units tablet  Medication Bottle Yes No    Take 1,000 Units by mouth Every Night.    furosemide (LASIX) 20 MG tablet   No No    Take 1 tablet by mouth Daily.    losartan (COZAAR) 50 MG tablet   No No    Take 1 tablet by mouth Daily.    metoprolol tartrate (LOPRESSOR) 25 MG tablet   Yes No    Take 25 mg by mouth 2 (Two) Times a Day.    vitamin B-12 (CYANOCOBALAMIN) 1000 MCG tablet   Yes No    Take 1,000 mcg by mouth Every Night.    vitamin C (ASCORBIC ACID) 500 MG tablet  Medication Bottle Yes No    Take 500 mg by mouth Every Night.        ---------------------------------------------------------------------------------------------------------------------    Objective     Hospital Scheduled Meds:          Current listed hospital scheduled medications may not yet reflect those currently placed in orders that are signed and held, awaiting patient's arrival to floor/unit.    ---------------------------------------------------------------------------------------------------------------------   Vital Signs:  Temp:  [97.7 °F (36.5 °C)-98.4 °F (36.9 °C)] 98.4 °F (36.9 °C)  Heart Rate:  [68-95] 68  Resp:  [16-18] 18  BP: (119-170)/(52-77) 167/54  Mean Arterial Pressure (Non-Invasive) for the past 24 hrs (Last 3 readings):   Noninvasive MAP (mmHg)   09/09/22 2101 85   09/09/22 1933 107   09/09/22 1923 88     SpO2 Percentage    09/09/22 1923 09/09/22 1933 09/09/22 2101   SpO2: 97% 96% 99%     SpO2:  [96 %-99 %] 99 %  on   ;   Device (Oxygen Therapy): room air    Body mass index is 28.34 kg/m².  Wt Readings from Last 3 Encounters:   09/09/22 72.6 kg (160 lb)   08/02/22 71.4 kg (157 lb 6.4 oz)   07/19/22 71.8 kg (158 lb 6.4 oz)     ---------------------------------------------------------------------------------------------------------------------   Physical Exam:  Physical Exam  Constitutional:       General: She is awake.      Appearance: Normal appearance. She is  well-developed.   HENT:      Head: Normocephalic and atraumatic.   Eyes:      General: Lids are normal.   Cardiovascular:      Rate and Rhythm: Normal rate and regular rhythm.      Pulses: Normal pulses.           Dorsalis pedis pulses are 2+ on the right side and 2+ on the left side.        Posterior tibial pulses are 2+ on the right side and 2+ on the left side.      Heart sounds: Murmur heard.     No friction rub. No gallop.   Pulmonary:      Effort: Pulmonary effort is normal. No tachypnea.      Breath sounds: Normal breath sounds.   Chest:      Chest wall: No tenderness.   Abdominal:      Palpations: Abdomen is soft.      Tenderness: There is no abdominal tenderness.   Musculoskeletal:      Right lower leg: No edema.      Left lower leg: No edema.   Skin:     Findings: No ecchymosis or erythema.   Neurological:      General: No focal deficit present.      Mental Status: She is alert and oriented to person, place, and time. Mental status is at baseline.   Psychiatric:         Speech: Speech normal.         Behavior: Behavior is cooperative.         Cognition and Memory: Cognition normal.       ---------------------------------------------------------------------------------------------------------------------  EKG:    Pending cardiology read.  Per my evaluation, initial EKG with sinus tachycardia with PACs and PVCs, heart rate 104 bpm, QTc 449 MS.    Telemetry:    Normal sinus rhythm, heart rate 65, SPO2 98% on room air    I have personally reviewed the EKG/Telemetry strip  ---------------------------------------------------------------------------------------------------------------------   Results from last 7 days   Lab Units 09/09/22 2036 09/09/22  1823   TROPONIN T ng/mL <0.010 <0.010     Results from last 7 days   Lab Units 09/09/22  1823   PROBNP pg/mL 972.9         Results from last 7 days   Lab Units 09/09/22  1823   WBC 10*3/mm3 6.25   HEMOGLOBIN g/dL 9.1*   HEMATOCRIT % 32.1*   MCV fL 102.2*   MCHC  g/dL 28.3*   PLATELETS 10*3/mm3 167     Results from last 7 days   Lab Units 09/09/22  1823   SODIUM mmol/L 143   POTASSIUM mmol/L 4.8   CHLORIDE mmol/L 106   CO2 mmol/L 16.4*   BUN mg/dL 22   CREATININE mg/dL 0.95   CALCIUM mg/dL 9.5   GLUCOSE mg/dL 102*   ALBUMIN g/dL 4.63   BILIRUBIN mg/dL 0.5   ALK PHOS U/L 107   AST (SGOT) U/L 27   ALT (SGPT) U/L 14   Estimated Creatinine Clearance: 43.6 mL/min (by C-G formula based on SCr of 0.95 mg/dL).    Lab Results   Component Value Date    HGBA1C 4.40 (L) 05/19/2022     Pain Management Panel     Pain Management Panel Latest Ref Rng & Units 7/7/2022    AMPHETAMINES SCREEN, URINE Negative Negative    BARBITURATES SCREEN Negative Negative    BENZODIAZEPINE SCREEN, URINE Negative Negative    BUPRENORPHINEUR Negative Negative    COCAINE SCREEN, URINE Negative Negative    METHADONE SCREEN, URINE Negative Negative    METHAMPHETAMINEUR Negative Negative        I have personally reviewed the above laboratory results.   ---------------------------------------------------------------------------------------------------------------------  Imaging Results (Last 7 Days)     Procedure Component Value Units Date/Time    XR Chest 1 View [[id number]] Collected: 09/09/22 1909     Updated: 09/09/22 1911    Narrative:      CR Chest 1 Vw    INDICATION:   Chest pain today     COMPARISON:    4/15/2022    FINDINGS:  Portable AP view(s) of the chest.  Sternal wires are present. Lungs are clear. Heart is upper limits of normal in size      Impression:      No acute cardiopulmonary findings.    Signer Name: Rui Pool MD   Signed: 9/9/2022 7:09 PM   Workstation Name: [facility]    [facility]        I have personally reviewed the above radiology results.     Last Echocardiogram:  Results for orders placed during the hospital encounter of 08/11/22    Adult Transthoracic Echo Complete W/ Cont if Necessary Per Protocol    Interpretation Summary  · Normal left ventricular cavity  size noted. Left ventricular wall thickness is consistent with mild concentric hypertrophy.  · Left ventricular ejection fraction appears to be 61 - 65%.  · Left ventricular diastolic function is consistent with (grade I) impaired relaxation.  · There is moderate calcification of the aortic valve. The aortic valve was poorly visualized but appears trileaflet. Mild aortic valve regurgitation is present. Moderate aortic valve stenosis is present.  · Ao max PG 56.1 mmHg Ao mean PG 32.5 mmHg  · Aortic valve area was not calculated.  · There are myxomatous changes of the mitral valve apparatus present. Mild to moderate mitral valve regurgitation is present. No significant mitral valve stenosis is present.  · There is no evidence of pericardial effusion.  · Comments: May consider a transesophageal echocardiographic study to have a better assessment of the aortic valve and aortic stenosis.    ---------------------------------------------------------------------------------------------------------------------    Assessment & Plan      Active Hospital Problems    Diagnosis  POA   • Chest pain, unspecified type [R07.9]  Yes     #Chest pain with typical features   #Severe AS s/p bioprosthetic valve (2013) and TAVR (7/2022) by Dr. Wade   #Hyperlipidemia   #Essential hypertension   -Currently chest pain-free; troponin T negative x3; EKG without acute ischemic changes  -It should be of note that the initial EKG automatic read was  atrial fibrillation, however, reviewed with attending; we feel that the patient is in sinus tachycardia with PACs and PVCs; currently in NSR on telemetry; monitor closely for rhythm change  -Continue to obtain serial EKGs  -Patient just had a left heart cath on 5/19/2022 that showed no significant disease; she also underwent-a heart cath on 7/7/2022 for a TAVR performed by Dr. Wade   -Obtain a lipid panel  -Review home medications once reconciled per pharmacy; based off of current list continue  Eliquis, also continue aspirin, statin, Cozaar, Lopressor    #Presyncope   -Patient denies any loss of consciousness but does endorse dizziness and lightheadedness  -Obtain UDS, CT of the head without contrast, orthostatic vitals, and bilateral carotid ultrasound  -Echo from 8/11/2022 with EF 61 to 65%  -PT/OT  -Up with assistance, fall precautions    #Chronic diastolic CHF   -Appears euvolemic on exam  -Echo from 8/11/2022 with EF 61 to 65%  -Monitor for signs of volume overload with daily weights, strict I's and O's    #COPD, w/o acute exacerbation   -Nebs available PRN       F/E/N: No IV fluids. Replace electrolytes as necessary. Cardiac diet.   -------------------------------------------------  DVT Prophylaxis: Eliquis   GI Prophylaxis: Protonix   Activity: Up with assistance, fall precautions     OBSERVATION status, however if further evaluation or treatment plans warrant, status may change.  Based upon current information, I predict patient's care encounter to be less than or equal to 2 midnights.    Code Status: FULL CODE     Disposition/Discharge planning: Plan for home at discharge. Pending clinical course     I have discussed the patient's assessment and plan with the patient, nursing staff, and attending physician       Sandie Mcintyre PA-C  Hospitalist Service -- [facility]       09/09/22  21:45 EDT    Attending Physician: Jarocho Galvan MD

## 2022-09-10 NOTE — DISCHARGE SUMMARY
[facility] Medicine Services  DISCHARGE SUMMARY    Date of Admission: 9/9/2022    Date of Discharge:  9/10/2022    PCP: Eric Wei MD    Discharging Provider: VALDEZ Jeronimo  Attending Physician on day of DC: Dr. 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          HPI     History of Present Illness:  Elizabeth Caceres is an 82 y.o. female with past medical history significant for severe aortic stenosis status post bioprosthetic valve and TAVR, essential hypertension, hyperlipidemia, chronic diastolic CHF, COPD, that presented to the [facility] emergency department on 9/9/2022 for evaluation of chest pain and presyncope. She was admitted under observation status for ACS rule out. For further and complete admission information, please see admission H&P.        Hospital Course     Hospital Course  Elizabeth Caceres was admitted as outlined in above HPI.     Patient with recent LHC 05/2022 with no significant coronary artery disease noted. Repeat LHC on 07/2022 with results not available. EKG on admission with no evidence of ischemic changes. Troponin T negative x5. Bilateral carotid ultrasound obtained without evidence of clinically significant stenosis. Orthostatic vital signs obtained; unremarkable. Chest xray without acute cardiopulmonary findings. CT head without contrast negative. Electrolytes and TSH within normal limits. Patient's chest pain had resolved on time of admission and remaining chest pain free on today's exam.  Denies palpitations, shortness of breath, dizziness, weakness, n/v/d, and abdominal pain. Patient with follow-up appointment scheduled on 9/21/2022 with VALDEZ Lua with Cardiothoracic Surgery. Follows with Carlton Merchant MD at [facility]. ACS ruled out. Recommend to keep scheduled follow ups and continue current home medication regimen. Labs and imaging altogether unremarkable. Patient with chronic normocytic anemia; Hgb appearing stable compared to baseline. VSS. Discussed with attending physician, Fifi Coppola DO. Patient is agreeable to discharge home this morning. Educated on importance of rising slowly when transitioning from lying to standing position to avoid episodes of orthostatic hypotension.     Telemetry on day of discharge was normal sinus rhythm 60's.     Oxygen saturation on the day of discharge was 98% on room air.      Pertinent Laboratory and Radiology Results     Pertinent Test Results:          Results from last 7 days   Lab Units 09/10/22  0336 09/09/22  1823   WBC 10*3/mm3 5.30 6.25   HEMOGLOBIN g/dL 8.3* 9.1*   HEMATOCRIT % 27.5* 32.1*   PLATELETS 10*3/mm3 184 167   MCV fL 93.5 102.2*   SODIUM mmol/L 140 143   POTASSIUM mmol/L 3.5 4.8   CHLORIDE mmol/L 105 106   CO2 mmol/L 23.4 16.4*   BUN mg/dL 18 22   CREATININE mg/dL 0.70 0.95   GLUCOSE mg/dL 99 102*   CALCIUM mg/dL 9.0 9.5        Results from last 7 days   Lab Units 09/10/22  0937 09/10/22  0336 09/09/22  2140 09/09/22 2036 09/09/22  1823   TROPONIN T ng/mL <0.010 <0.010 <0.010 <0.010 <0.010   PROBNP pg/mL  --   --   --   --  972.9     Results from last 7 days   Lab Units 09/10/22  0336 09/09/22  1823   WBC 10*3/mm3 5.30 6.25     Results from last 7 days   Lab Units 09/10/22  0336 09/09/22  1823   BILIRUBIN mg/dL 0.9 0.5   ALK PHOS U/L 91 107   ALT (SGPT) U/L 11 14   AST (SGOT) U/L 16 27     Results from last 7 days   Lab Units 09/10/22  0336   CHOLESTEROL mg/dL 112   TRIGLYCERIDES mg/dL 78   HDL CHOL mg/dL 47      Results from last 7 days   Lab Units 09/10/22  0336 09/09/22  2140   TSH uIU/mL  --  2.850   HEMOGLOBIN A1C % 4.90  --      Brief Urine Lab Results  (Last result in the past 365 days)      Color   Clarity   Blood   Leuk Est   Nitrite   Protein   CREAT   Urine HCG        07/07/22 0052 Yellow   Clear   Negative   Small (1+)   Positive   Negative                     Results for orders placed during the hospital encounter of 05/19/22    Duplex Carotid Ultrasound CAR    Interpretation Summary  · No evidence of hemodynamically significant stenosis of bilateral carotid arteries.  · Intimal thickening and mild scattered plaque is noted.      Results for orders placed during the hospital encounter of 08/11/22    Adult Transthoracic Echo Complete W/ Cont if Necessary Per Protocol    Interpretation Summary  · Normal left ventricular cavity size noted. Left ventricular wall thickness is consistent with mild concentric hypertrophy.  · Left ventricular ejection fraction appears to be 61 - 65%.  · Left ventricular diastolic function is consistent with (grade I) impaired relaxation.  · There is moderate calcification of the aortic valve. The aortic valve was poorly visualized but appears trileaflet. Mild aortic valve regurgitation is present. Moderate aortic valve stenosis is present.  · Ao max PG 56.1 mmHg Ao mean PG 32.5 mmHg  · Aortic valve area was not calculated.  · There are myxomatous changes of the mitral valve apparatus present. Mild to moderate mitral valve regurgitation is present. No significant mitral valve stenosis is present.  · There is no evidence of pericardial effusion.  · Comments: May consider a transesophageal echocardiographic study to have a better assessment of the aortic valve and aortic stenosis.            ----------------------------------------------------------------------------------------------------------------------  CT Head Without Contrast    Result Date: 9/10/2022  Normal, negative  unenhanced head CT. Signer Name: Rui Pool MD  Signed: 9/10/2022 8:22 AM  Workstation Name: [facility]  [facility]    XR Chest 1 View    Result Date: 9/9/2022  No acute cardiopulmonary findings. Signer Name: Rui Pool MD  Signed: 9/9/2022 7:09 PM  Workstation Name: [facility]  [facility]    US Carotid Bilateral    Result Date: 9/10/2022  No evidence of a hemodynamically or clinically significant stenosis on this study. Signer Name: Anton Baker MD  Signed: 9/10/2022 11:34 AM  Workstation [facility]  [facility]        Microbiology Results (last 10 days)     Procedure Component Value - Date/Time    COVID-19 and FLU A/B PCR - Swab, Nasopharynx [[id number]]  (Normal) Collected: 09/09/22 1848    Lab Status: Final result Specimen: Swab from Nasopharynx Updated: 09/09/22 1916     COVID19 Not Detected     Influenza A PCR Not Detected     Influenza B PCR Not Detected    Narrative:      Fact sheet for providers: https://www.fda.gov/media/962853/download    Fact sheet for patients: https://www.fda.gov/media/147125/download    Test performed by PCR.          Labs above have been reviewed on the day of discharge.  Radiology images from prior 30 days were reviewed prior to discharge as incorporated into this document.     Discharge Vitals and Physical Examination       Vital Signs  Temp:  [97.7 °F (36.5 °C)-98.4 °F (36.9 °C)] 98 °F (36.7 °C)  Heart Rate:  [64-95] 70  Resp:  [16-18] 18  BP: (119-170)/(52-77) 135/64     PHYSICAL EXAMINATION:   Physical Exam  Vitals and nursing note reviewed.   Constitutional:       General: She is awake. She is not in acute distress.     Appearance: She is not diaphoretic.      Comments: Room air.   HENT:      Head: Normocephalic and atraumatic.      Mouth/Throat:      Mouth: Mucous membranes are moist.      Pharynx: Oropharynx is clear.   Eyes:      Extraocular Movements: Extraocular movements intact.       Pupils: Pupils are equal, round, and reactive to light.   Neck:      Vascular: No carotid bruit.   Cardiovascular:      Rate and Rhythm: Normal rate and regular rhythm.      Pulses: Normal pulses.           Dorsalis pedis pulses are 2+ on the right side and 2+ on the left side.      Heart sounds: Normal heart sounds.     No friction rub.   Pulmonary:      Effort: Pulmonary effort is normal. No accessory muscle usage, respiratory distress or retractions.      Breath sounds: Normal breath sounds. No wheezing or rhonchi.   Abdominal:      General: Bowel sounds are normal. There is no distension.      Palpations: Abdomen is soft.      Tenderness: There is no abdominal tenderness. There is no guarding.   Musculoskeletal:      Cervical back: Neck supple. No rigidity.   Skin:     General: Skin is warm and dry.      Capillary Refill: Capillary refill takes less than 2 seconds.   Neurological:      Mental Status: She is alert and oriented to person, place, and time. Mental status is at baseline.      Cranial Nerves: No facial asymmetry.      Sensory: Sensation is intact.      Motor: No weakness or tremor.   Psychiatric:         Attention and Perception: Attention normal.         Mood and Affect: Mood normal.         Speech: Speech normal.         Behavior: Behavior normal. Behavior is cooperative.         Thought Content: Thought content normal.         Cognition and Memory: Cognition normal.         Discharge Disposition, Discharge Medications, and Discharge Appointments     Discharge Disposition: Home, Self-Care    Condition on Discharge: Stable    Discharge Medications:     Discharge Medications      Continue These Medications      Instructions Start Date   apixaban 2.5 MG tablet tablet  Commonly known as: ELIQUIS   2.5 mg, Oral, Every 12 Hours Scheduled      aspirin 81 MG EC tablet   81 mg, Oral, Daily      atorvastatin 20 MG tablet  Commonly known as: LIPITOR   20 mg, Oral, Daily      cholecalciferol 25 MCG (1000 UT)  tablet  Commonly known as: VITAMIN D3   1,000 Units, Oral, Nightly      furosemide 20 MG tablet  Commonly known as: LASIX   20 mg, Oral, Daily      losartan 50 MG tablet  Commonly known as: COZAAR   50 mg, Oral, Daily      metoprolol tartrate 25 MG tablet  Commonly known as: LOPRESSOR   25 mg, Oral, 2 Times Daily      vitamin B-12 1000 MCG tablet  Commonly known as: CYANOCOBALAMIN   1,000 mcg, Oral, Nightly      vitamin C 500 MG tablet  Commonly known as: ASCORBIC ACID   500 mg, Oral, Nightly             Discharged medication regimen discussed with attending physician prior to discharge.     Discharge Diet: Regular, Cardiac, Consistent Carbohydrate    Dietary Orders (From admission, onward)     Start     Ordered    09/09/22 2306  Diet Regular; Cardiac, Consistent Carbohydrate  Diet Effective Now        Question Answer Comment   Diet Texture / Consistency Regular    Common Modifiers Cardiac    Common Modifiers Consistent Carbohydrate        09/09/22 2305                Activity at Discharge: As tolerated     Discharge Disposition:    Home or Self Care        Follow-up Appointments:  Your Scheduled Appointments    Sep 21, 2022  2:00 PM  Follow Up with VALDEZ Bee  [facility] CARDIOTHORACIC SURGERY ([address] [id number] [phone]   -Bring photo ID, insurance card, and list of medications to appointment  -If testing was completed outside of [facility] then patient must bring images on a disc  -Copay will be collected at time of appointment  -Established patients should arrive 15 minutes prior to appointment     Sep 22, 2022  2:00 PM   [place] Echo Transesophageal Vt with BENTON CARD AXEL  [facility] CARDIOVASCULAR LAB ([place]) [address] [place] [address]  3rd floor  [facility] [address] [phone]   • Please plan to arrive 30 minutes prior to your scheduled exam time. This is necessary to ensure adequate time for registration and  instructions.  • If you arrive more than 15 minutes late for your scheduled appointment, you can expect delays and will possibly be required to reschedule for a different date.  • There is no at-home prep for this exam. To ensure quality images, an IV may be inserted as needed.  • Total exam time can be up to 1 hour.     Aug 29, 2023  1:30 PM  Follow Up with Carlton Osman MD  [facility] CARDIOLOGY [facility]) [address] [facility] [address] [phone]   -Bring photo ID, insurance card, and list of medications to appointment  -If testing was completed outside of [facility] then patient must bring images on a disc  -Copay will be collected at time of appointment  -Established patients should arrive 15 minutes prior to appointment     Additional instructions:      Dr wei  office  closed on  the  weekend  will call on  Monday  and  get  apointment  and  call pt   with  apointment  pt  has  an  ap[ointmetn  sith  cardiothoriac  sugeron  ana luisa warren  for  sept 21  at 2 pm           Additional Instructions for the Follow-ups that You Need to Schedule     Discharge Follow-up with PCP   As directed       Currently Documented PCP:    Eric Wei MD    PCP Phone Number:    [phone]     Follow Up Details: 1-2 Weeks         Discharge Follow-up with Specified Provider: Continue upcoming scheduled follow-ups with Ana Luisa Warren APRN and Dr. Osman at [facility]   As directed      To: Continue upcoming scheduled follow-ups with Ana Luisa Warren APRN and Dr. Osman at [facility]            Follow-up Information     Eric Wei MD .    Specialty: Internal Medicine  Why: 1-2 Weeks  Contact information:  [address] [facility] [address] [phone]                         Additional Instructions for the Follow-ups that You Need to Schedule     Discharge Follow-up with PCP   As directed       Currently Documented PCP:    Eric Wei MD    PCP Phone  Number:    [phone]     Follow Up Details: 1-2 Weeks         Discharge Follow-up with Specified Provider: Continue upcoming scheduled follow-ups with Ana Luisa Mora APRN and Dr. Osman at [facility]   As directed      To: Continue upcoming scheduled follow-ups with Ana Luisa Mora APRN and Dr. Osman at [facility]                  VALDEZ Jeronimo   [facility] Medicine Team  09/10/22  13:34 EDT      Time: Greater than 30 minutes spent on this discharge.  I spent 45 minutes on this discharge activity which included:  Face-to-face encounter with the patient, discussing plan with attending physician, reviewing the data in the system, coordination of the care with the nursing staff as well as consultations, documentation, and entering orders.

## 2022-09-11 ENCOUNTER — READMISSION MANAGEMENT (OUTPATIENT)
Dept: CALL CENTER | Facility: HOSPITAL | Age: 82
End: 2022-09-11

## 2022-09-11 NOTE — OUTREACH NOTE
Prep Survey    Flowsheet Row Responses   [facility] patient discharged from? [facility]   Is LACE score < 7 ? No   Emergency Room discharge w/ pulse ox? No   Eligibility Readm Mgmt   Discharge diagnosis Chronic diastolic CHF   Does the patient have one of the following disease processes/diagnoses(primary or secondary)? CHF   Does the patient have Home health ordered? No   Is there a DME ordered? No   Prep survey completed? Yes          ANAM RUSHING - Registered Nurse

## 2022-09-12 ENCOUNTER — TELEPHONE (OUTPATIENT)
Dept: TELEMETRY | Facility: HOSPITAL | Age: 82
End: 2022-09-12

## 2022-09-12 LAB
QT INTERVAL: 342 MS
QT INTERVAL: 422 MS
QT INTERVAL: 442 MS
QTC INTERVAL: 438 MS
QTC INTERVAL: 444 MS
QTC INTERVAL: 449 MS

## 2022-09-13 ENCOUNTER — DOCUMENTATION (OUTPATIENT)
Dept: CARDIAC REHAB | Facility: HOSPITAL | Age: 82
End: 2022-09-13

## 2022-09-13 NOTE — PROGRESS NOTES
Follow up call with patient concerning cardiac rehab. She stated she still has transportation issues and her  is sick so she will have to decline at this time.  I told her if she decides she would like to come contact her cardiologist and they can send another referral. She stated she would

## 2022-09-15 ENCOUNTER — READMISSION MANAGEMENT (OUTPATIENT)
Dept: CALL CENTER | Facility: HOSPITAL | Age: 82
End: 2022-09-15

## 2022-09-15 NOTE — OUTREACH NOTE
CHF Week 1 Survey    Flowsheet Row Responses   [facility] patient discharged from? [facility]   Does the patient have one of the following disease processes/diagnoses(primary or secondary)? CHF   CHF Week 1 attempt successful? Yes   Call start time 1538   Call end time 1546   Discharge diagnosis Chronic diastolic CHF   Person spoke with today (if not patient) and relationship patient   Meds reviewed with patient/caregiver? Yes   Is the patient having any side effects they believe may be caused by any medication additions or changes? No   Does the patient have all medications ordered at discharge? Yes   Is the patient taking all medications as directed (includes completed medication regime)? Yes   Does the patient have a primary care provider?  Yes   Has the patient kept scheduled appointments due by today? Yes   Psychosocial issues? No   Did the patient receive a copy of their discharge instructions? Yes   Nursing interventions Reviewed instructions with patient   What is the patient's perception of their health status since discharge? Improving   Nursing interventions Nurse provided patient education   Is the patient able to teach back signs and symptoms of worsening condition? (i.e. weight gain, shortness of air, etc.) Yes   If the patient is a current smoker, are they able to teach back resources for cessation? Not a smoker   Is the patient/caregiver able to teach back the hierarchy of who to call/visit for symptoms/problems? PCP, Specialist, Home health nurse, Urgent Care, ED, 911 Yes   Is the patient able to teach back Heart Failure Zones? Yes   CHF Nursing Interventions Education provided on various zones   CHF Zone this Call Green Zone   Green Zone Patient reports doing well   Green Zone Interventions Meds as directed    CHF Week 1 call completed? Yes   Wrap up additional comments Patient reports she is improving. No questions or concerns.    Call end time 1546          SARTHAK KNIGHT - Registered Nurse

## 2022-09-21 ENCOUNTER — OFFICE VISIT (OUTPATIENT)
Dept: CARDIAC SURGERY | Facility: CLINIC | Age: 82
End: 2022-09-21

## 2022-09-21 VITALS
HEIGHT: 63 IN | WEIGHT: 162.4 LBS | BODY MASS INDEX: 28.77 KG/M2 | SYSTOLIC BLOOD PRESSURE: 132 MMHG | OXYGEN SATURATION: 96 % | HEART RATE: 66 BPM | DIASTOLIC BLOOD PRESSURE: 72 MMHG | TEMPERATURE: 98 F

## 2022-09-21 DIAGNOSIS — Z95.2 S/P TAVR (TRANSCATHETER AORTIC VALVE REPLACEMENT): Primary | ICD-10-CM

## 2022-09-21 PROCEDURE — 99213 OFFICE O/P EST LOW 20 MIN: CPT | Performed by: NURSE PRACTITIONER

## 2022-09-21 NOTE — PROGRESS NOTES
[facility] Cardiothoracic Surgery Office Follow Up Note     Date of Encounter: 2022     Name: Elizabeth Caceres  : 1940     Referred By: No ref. provider found  PCP: Eric Wie MD    Chief Complaint:    Chief Complaint   Patient presents with   • Aortic Stenosis     4-6 week follow up with CXR - s/p TAVR on 22       Subjective      History of Present Illness:    Elizabeth Caceres is a very pleasant 82 y.o. female former smoker with history of COPD, HTN, HLD on statin therapy, CHF, and aortic stenosis s/p bioprosthetic valve in  by Dr. Sebastian with recurrent severe aortic stenosis s/p TAVR on 2022 by Dr. Wade.  Patient was seen in postop 2022 with significant improvement in her preoperative shortness of air and activity intolerance.  Her CXR in clinic showed improving left effusion/atelectasis and she presents today for repeat CXR.  Patient did have hospitalization 9/10/2022 for ACS rule out.  She had episode of chest tightness after restraining her little dog for nail clipping which required bending over for certain period of time.  Her diagnostic work-up was unrevealing/ACS ruled out.  She had no further episodes of chest pain, palpitations, or respiratory complaints today.  Her preoperative SOA and activity intolerance has resolved.  Her aortic valve with not completely evaluated during her postoperative TTE and she is anticipating AXEL tomorrow. She is followed by her cardiologist Dr. Osman    Review of Systems:  Review of Systems   Constitutional: Negative for chills, decreased appetite, diaphoresis, fever, malaise/fatigue, night sweats, weight gain and weight loss.   HENT: Negative for hoarse voice.    Eyes: Negative for blurred vision, double vision and visual disturbance.   Cardiovascular: Negative for chest pain, claudication, dyspnea on exertion, irregular heartbeat, leg swelling, near-syncope, orthopnea, palpitations, paroxysmal nocturnal dyspnea and syncope.  "  Respiratory: Negative for cough, hemoptysis, shortness of breath, sputum production and wheezing.    Hematologic/Lymphatic: Negative for adenopathy and bleeding problem. Does not bruise/bleed easily.   Skin: Negative for color change, nail changes, poor wound healing and rash.   Musculoskeletal: Negative for back pain, falls and muscle cramps.   Gastrointestinal: Negative for abdominal pain, dysphagia and heartburn.   Genitourinary: Negative for flank pain.   Neurological: Negative for brief paralysis, disturbances in coordination, dizziness, focal weakness, headaches, light-headedness, loss of balance, numbness, paresthesias, sensory change, vertigo and weakness.   Psychiatric/Behavioral: Negative for depression and suicidal ideas.   Allergic/Immunologic: Negative for persistent infections.       I have reviewed the following portions of the patient's history: allergies, current medications, past family history, past medical history, past social history, past surgical history and problem list and confirm it's accurate.    Allergies:  Allergies   Allergen Reactions   • Carvedilol Nausea Only     \"INTOLERANCE\"       Medications:      Current Outpatient Medications:   •  apixaban (ELIQUIS) 2.5 MG tablet tablet, Take 1 tablet by mouth Every 12 (Twelve) Hours., Disp: 60 tablet, Rfl: 1  •  aspirin 81 MG EC tablet, Take 81 mg by mouth Daily., Disp: , Rfl:   •  atorvastatin (LIPITOR) 20 MG tablet, Take 20 mg by mouth Daily., Disp: , Rfl:   •  cholecalciferol (VITAMIN D3) 1000 units tablet, Take 1,000 Units by mouth Every Night., Disp: , Rfl:   •  furosemide (LASIX) 20 MG tablet, Take 1 tablet by mouth Daily., Disp: 30 tablet, Rfl: 3  •  losartan (COZAAR) 50 MG tablet, Take 1 tablet by mouth Daily., Disp: 90 tablet, Rfl: 3  •  metoprolol tartrate (LOPRESSOR) 25 MG tablet, Take 25 mg by mouth 2 (Two) Times a Day., Disp: , Rfl:   •  vitamin B-12 (CYANOCOBALAMIN) 1000 MCG tablet, Take 1,000 mcg by mouth Every Night., Disp: , " Rfl:   •  vitamin C (ASCORBIC ACID) 500 MG tablet, Take 500 mg by mouth Every Night., Disp: , Rfl:     History:   Past Medical History:   Diagnosis Date   • Arthritis    • Congestive heart failure due to valvular disease (HCC) 2022   • COPD (chronic obstructive pulmonary disease) (HCC)    • Hyperlipidemia    • Hypertension    • MRSA (methicillin resistant Staphylococcus aureus)     History of MRSA with I&D of lower extremity   • Urinary frequency        Past Surgical History:   Procedure Laterality Date   • AORTIC VALVE REPAIR/REPLACEMENT  10/2013    Dr. Sebastian   • AORTIC VALVE REPAIR/REPLACEMENT N/A 2022    Procedure: TRANSAORTIC TRANSCATHETER AORTIC VALVE REPLACEMENT;  Surgeon: Grayson Wade MD;  Location:  [facility];  Service: Cardiothoracic;  Laterality: N/A;  FL-7 m 42 s  Dose-357 mgy  Contrast- 50 ml isovue   • AORTIC VALVE REPAIR/REPLACEMENT N/A 2022    Procedure: Transcatheter Aortic Valve Replacement;  Surgeon: Carlton Osman MD;  Location:  [facility];  Service: Cardiovascular;  Laterality: N/A;   • APPENDECTOMY N/A 2019    Procedure: APPENDECTOMY LAPAROSCOPIC;  Surgeon: Peter Vargas MD;  Location: [facility];  Service: General   • CARDIAC CATHETERIZATION Left 2022    Procedure: Left Heart Cath;  Surgeon: Carlton Osman MD;  Location:  [facility] CATH INVASIVE LOCATION;  Service: Cardiology;  Laterality: Left;  SAME DAY AS AXEL   • CARDIAC SURGERY     • COLONOSCOPY     • HIP ARTHROPLASTY Left    • TOTAL LAPAROSCOPIC HYSTERECTOMY SALPINGO OOPHORECTOMY Right 2019    Procedure: LAPAROSCOPIC SALPINGOOPHORECTOMY;  Surgeon: Peter Vargas MD;  Location: [facility];  Service: General       Social History     Socioeconomic History   • Marital status:    Tobacco Use   • Smoking status: Former Smoker     Quit date:      Years since quittin.7   • Smokeless tobacco: Never Used   Vaping Use   • Vaping Use: Never used   Substance and Sexual Activity   • Alcohol  "use: No   • Drug use: No   • Sexual activity: Defer        Family History   Problem Relation Age of Onset   • Cancer Father    • Heart disease Brother        Objective     Physical Exam:  Vitals:    09/21/22 1351   BP: 132/72   BP Location: Right arm   Patient Position: Sitting   Pulse: 66   Temp: 98 °F (36.7 °C)   SpO2: 96%   Weight: 73.7 kg (162 lb 6.4 oz)   Height: 160 cm (63\")      Body mass index is 28.77 kg/m².    Physical Exam  Constitutional:       Appearance: Normal appearance.   Cardiovascular:      Rate and Rhythm: Normal rate.      Pulses:           Radial pulses are 2+ on the right side and 2+ on the left side.      Heart sounds: S1 normal and S2 normal.   Pulmonary:      Effort: Pulmonary effort is normal.   Musculoskeletal:      Right lower leg: No edema.      Left lower leg: No edema.   Skin:     General: Skin is warm and dry.   Neurological:      Mental Status: She is alert and oriented to person, place, and time. Mental status is at baseline.         Imaging/Labs:  XR Chest 1 View-9/9/2022  No acute cardiopulmonary findings.    TTE-8/11/2022  · Normal left ventricular cavity size noted. Left ventricular wall thickness is consistent with mild concentric hypertrophy.  · Left ventricular ejection fraction appears to be 61 - 65%.  · Left ventricular diastolic function is consistent with (grade I) impaired relaxation.  · There is moderate calcification of the aortic valve. The aortic valve was poorly visualized but appears trileaflet. Mild aortic valve regurgitation is present. Moderate aortic valve stenosis is present.  · Ao max PG 56.1 mmHg Ao mean PG 32.5 mmHg  · Aortic valve area was not calculated.  · There are myxomatous changes of the mitral valve apparatus present. Mild to moderate mitral valve regurgitation is present. No significant mitral valve stenosis is present.  · There is no evidence of pericardial effusion.  · Comments: May consider a transesophageal echocardiographic study to have a " better assessment of the aortic valve and aortic stenosis.    Assessment / Plan      Assessment / Plan:  Diagnoses and all orders for this visit:    1. S/P TAVR (transcatheter aortic valve replacement) (Primary)       · aortic stenosis s/p bioprosthetic valve in 2013 by Dr. Sebastian   · recurrent severe aortic stenosis s/p TAVR on 7/7/2022 by Dr. Wade.    · seen in postop 8/2/2022 with significant improvement in her preoperative shortness of air and activity intolerance.    · CXR in clinic showed improving left effusion/atelectasis   · presents today for repeat CXR with no rspiratory complaints and CXR personally reviewed with no acute disease, no PTX, no effusion  · hospitalization 9/10/2022 for ACS -ruled out. No further episodes    · aortic valve not completely evaluated during her postoperative TTE and she is anticipating AXEL tomorrow  · follow up with cardiologist Dr. Osman as previously determined    Follow Up:   Return on as needed basis.   Or sooner for any further concerns or worsening sign and symptoms. If unable to reach us in the office please dial 911 or go to the nearest emergency department.      Ana Luisa KELLOGG  [facility] Cardiothoracic Surgery    Time Spent: I spent 24 minutes caring for Elizabeth on this date of service. This time includes time spent by me in the following activities: preparing for the visit, reviewing tests, obtaining and/or reviewing a separately obtained history, performing a medically appropriate examination and/or evaluation, counseling and educating the patient/family/caregiver, ordering medications, tests, or procedures, documenting information in the medical record, independently interpreting results and communicating that information with the patient/family/caregiver and care coordination.

## 2022-09-22 ENCOUNTER — HOSPITAL ENCOUNTER (OUTPATIENT)
Dept: CARDIOLOGY | Facility: HOSPITAL | Age: 82
Discharge: HOME | End: 2022-09-22
Admitting: NURSE PRACTITIONER

## 2022-09-22 VITALS
WEIGHT: 162.48 LBS | RESPIRATION RATE: 24 BRPM | OXYGEN SATURATION: 91 % | DIASTOLIC BLOOD PRESSURE: 64 MMHG | BODY MASS INDEX: 28.79 KG/M2 | HEIGHT: 63 IN | HEART RATE: 67 BPM | SYSTOLIC BLOOD PRESSURE: 140 MMHG

## 2022-09-22 DIAGNOSIS — I38 VALVULAR HEART DISEASE: ICD-10-CM

## 2022-09-22 DIAGNOSIS — I35.0 AORTIC STENOSIS, SEVERE: ICD-10-CM

## 2022-09-22 LAB
BH CV ECHO MEAS - AO MEAN PG: 26 MMHG
LV EF 2D ECHO EST: 60 %
MAXIMAL PREDICTED HEART RATE: 138 BPM
STRESS TARGET HR: 117 BPM

## 2022-09-22 PROCEDURE — 93325 DOPPLER ECHO COLOR FLOW MAPG: CPT | Performed by: INTERNAL MEDICINE

## 2022-09-22 PROCEDURE — 25010000002 MIDAZOLAM PER 1 MG: Performed by: INTERNAL MEDICINE

## 2022-09-22 PROCEDURE — 93321 DOPPLER ECHO F-UP/LMTD STD: CPT

## 2022-09-22 PROCEDURE — 93321 DOPPLER ECHO F-UP/LMTD STD: CPT | Performed by: INTERNAL MEDICINE

## 2022-09-22 PROCEDURE — 93312 ECHO TRANSESOPHAGEAL: CPT

## 2022-09-22 PROCEDURE — 93312 ECHO TRANSESOPHAGEAL: CPT | Performed by: INTERNAL MEDICINE

## 2022-09-22 PROCEDURE — 93325 DOPPLER ECHO COLOR FLOW MAPG: CPT

## 2022-09-22 RX ORDER — MIDAZOLAM HYDROCHLORIDE 1 MG/ML
INJECTION INTRAMUSCULAR; INTRAVENOUS
Status: COMPLETED | OUTPATIENT
Start: 2022-09-22 | End: 2022-09-22

## 2022-09-22 RX ADMIN — MIDAZOLAM HYDROCHLORIDE 2 MG: 1 INJECTION, SOLUTION INTRAMUSCULAR; INTRAVENOUS at 14:16

## 2022-09-22 RX ADMIN — MIDAZOLAM HYDROCHLORIDE 2 MG: 1 INJECTION, SOLUTION INTRAMUSCULAR; INTRAVENOUS at 14:28

## 2022-09-22 NOTE — H&P
Pre-cardiac Catheterization History and Physical  [facility]      Patient:  Elizabeth Caceres  :  1940  MRN: [id number]    PCP:  Eric Wei MD  PHONE:  [phone]    DATE: 2022  ID: Elizabeth Caceres is a 82 y.o. female     CC: aortic stenosis, s/p TAVR    PROBLEM LIST:  1. Valvular heart disease:  a. Echocardiogram, 10/2013: Critical AS with SHARA 0.7 cm2 and mean gradient of 45 mmHg.   b. Abnormal Cardiolite stress test, 2013.    c. LHC, 10/23/2013, Dr. Osman: Critical AS with gradient of 101 mm across the aortic valve.  EF 70%. No significant coronary disease.    d. AVR with bioprosthetic valve, 10/2013, Dr. Sebastian.   e. Normal MPS with no evidence of ischemia. EF 80%.  f. Echocardiogram, 10/03/2017: EF 66-70%. LV diastolic dysfunction (grade I) consistent with impaired relaxation. Bioprosthetic AV. Calcification of the aortic valve mainly affecting the non and right coronary cusp(s). Mild to moderate AS. Peak gradient of 35 and a mean gradient of 20 mm Hg. SHARA 1.0cm2, probably an underestimation. Myxomatous changes of the mitral valve apparatus. Mild MR. No evidence of pericardial effusion.  g. Echocardiogram, 10/11/2018: EF > 70% with mild concentric LVH. LV diastolic dysfunction (grade I). Mild AS, bioprosthetic AV with peak gradient 30 mmHg, mean 17. No AI. Mild TR, RVSP 37 mmHg. No evidence of pericardial effusion.  h. Echocardiogram, 2021: EF 61-65%. Moderate LVH. Grade I diastolic dysfunction. Bioprosthetic AV present. Moderate to severe AS, Ao max PG 68 mmHg, Ao mean PG 36 mmHg, SHARA (I,D) 1.16 cm^2. RVSP from TR 35-45 mmHg. Mild PHTN.   i. Echocardiogram, 2022; EF 61-65%. EF 61-65%. Severe AS, max .4 mmHg, mean PG 65.3 mmHg, Ao V2 .3 cm, SHARA(I,D) 0.51 cm2. Moderate concentric LVH. Grade I diastolic dysfunction. Mild MR and TR. Moderate PHTN (50 mmHg).   j. Echocardiogram, 2022: Moderate biatrial enlargement,  "mild to moderate concentric hypertrophy, EF 60%.  There is a bioprosthetic aortic valve present with restricted leaflet mobility and severe aortic stenosis with a mean gradient of 56.7 mmHg and STJ 2.6 cm  k. LHC 5/19/2022: Angiographically near normal coronaries  l. Carotid duplex 5/19/2022: No evidence of hemodynamically significant stenosis of bilateral carotid arteries, intimal thickening and mild scattered plaque noted  2. Aortic stenosis  m. Echocardiogram, 07/06/2022; EF 55%. Mild concentric LVH. Severe aortic stenosis of the bioprosthetic valve is noted with mean gradient 61.7 mmHg and SHARA 0.43 cm². Mild AI. Moderate MR. Mild to moderate TR.  n. AXEL 07/07/2022; LVEF 45% without obvious WMA.  RV function mildly reduced, mild dilation.  Prosthetic AV with severe stenosis, SHARA 0.4 by CE, mean gradient 54mmhg, vmax 4.8m/s; DI 0.16.  Moderate AI (VC 0.4). Moderate MR with central jet. Mild TR, trace PI. No PFO, ÁNGLE clot, nor obvious baseline effusion.  Minimal nonmobile atheroma in aorta.   o. TAVR, 07/07/2022; Successful transcatheter aortic valve replacement with 20 mm [place] SANGITA tissue valve. No acute procedure-related complications.   3. Hypertension.   4. Dyslipidemia.    5. History of presyncope.   6. History of MRSA with I&D of lower extremity, 2007.   7. Vitamin D and Vitamin B12 deficiency.     BRIEF HPI:  She is a 82-year-old female with past medical history as listed above who recently underwent transcatheter aortic valve replacement.  Since then, she has been doing well.  Her chest pain, shortness of breath, and edema have substantially improved as has her quality of life.  She presents today for AXEL for evaluation of TAVR valve as her intraoperative AXEL did not have great visualization of TAVR valve.    Allergies:      Allergies   Allergen Reactions   • Carvedilol Nausea Only     \"INTOLERANCE\"       MEDICATIONS:  Current Outpatient Medications   Medication Instructions   • apixaban (ELIQUIS) 2.5 " mg, Oral, Every 12 Hours Scheduled   • aspirin 81 mg, Oral, Daily   • atorvastatin (LIPITOR) 20 mg, Oral, Daily   • cholecalciferol (VITAMIN D3) 1,000 Units, Oral, Nightly   • furosemide (LASIX) 20 mg, Oral, Daily   • losartan (COZAAR) 50 mg, Oral, Daily   • metoprolol tartrate (LOPRESSOR) 25 mg, Oral, 2 Times Daily   • vitamin B-12 (CYANOCOBALAMIN) 1,000 mcg, Oral, Nightly   • vitamin C (ASCORBIC ACID) 500 mg, Oral, Nightly       Past medical & surgical history, social and family history reviewed in the electronic medical record.    ROS: Pertinent positives listed in the HPI and problem list above. All others reviewed and negative.     Physical Exam:   BP (!) 187/77   Pulse 80   Resp 10   LMP  (LMP Unknown)   SpO2 100%     Constitutional:    Alert, cooperative, in no acute distress   Neck:     No Jugular venous distention, adenopathy, or thyromegaly noted.    Heart:    Regular rhythm and normal rate, normal S1 and S2, 1/6 systolic murmur   Lungs:     Clear to auscultation bilaterally, respirations regular, even and unlabored    Abdomen:     Soft nontender, nondistended, normal bowel sounds   Extremities:   No gross deformities, no edema, clubbing, or cyanosis.    Pulses:   Peripheral pulses palpable and equal bilaterally.       Labs and Diagnostic Data:          Lab Results   Component Value Date    CHOL 112 09/10/2022    TRIG 78 09/10/2022    HDL 47 09/10/2022    LDL 49 09/10/2022    AST 16 09/10/2022    ALT 11 09/10/2022                   EKG/Tele: NSR    IMPRESSION:  · Patient is an 82 year old female who is now status post TAVR who presents today for AXEL for evaluation of TAVR valve.    PLAN:  · Procedure to perform: AXEL. Risks, benefits and alternatives to the procedure explained to the patient and he understands and wishes to proceed.       Silvana Boyle PA-C

## 2022-09-30 DIAGNOSIS — I35.0 AORTIC STENOSIS, SEVERE: ICD-10-CM

## 2022-09-30 DIAGNOSIS — I38 VALVULAR HEART DISEASE: ICD-10-CM

## 2022-10-04 RX ORDER — FUROSEMIDE 20 MG/1
20 TABLET ORAL DAILY
Qty: 90 TABLET | Refills: 1 | Status: SHIPPED | OUTPATIENT
Start: 2022-10-04 | End: 2022-10-07 | Stop reason: SDUPTHER

## 2022-10-07 RX ORDER — FUROSEMIDE 20 MG/1
20 TABLET ORAL DAILY
Qty: 90 TABLET | Refills: 1 | Status: SHIPPED | OUTPATIENT
Start: 2022-10-07

## 2022-11-23 RX ORDER — LOSARTAN POTASSIUM 50 MG/1
TABLET ORAL
Qty: 90 TABLET | Refills: 3 | Status: SHIPPED | OUTPATIENT
Start: 2022-11-23

## 2023-01-04 DIAGNOSIS — I35.0 AORTIC STENOSIS, SEVERE: ICD-10-CM

## 2023-01-04 DIAGNOSIS — I38 VALVULAR HEART DISEASE: ICD-10-CM

## 2023-01-04 RX ORDER — APIXABAN 2.5 MG/1
TABLET, FILM COATED ORAL
Qty: 180 TABLET | Refills: 2 | Status: SHIPPED | OUTPATIENT
Start: 2023-01-04

## 2023-05-12 RX ORDER — FUROSEMIDE 20 MG/1
TABLET ORAL
Qty: 90 TABLET | Refills: 1 | Status: SHIPPED | OUTPATIENT
Start: 2023-05-12

## 2023-07-25 ENCOUNTER — TRANSCRIBE ORDERS (OUTPATIENT)
Dept: ADMINISTRATIVE | Facility: HOSPITAL | Age: 83
End: 2023-07-25
Payer: MEDICARE

## 2023-07-25 DIAGNOSIS — Z78.0 ASYMPTOMATIC MENOPAUSAL STATE: ICD-10-CM

## 2023-07-25 DIAGNOSIS — Z12.31 VISIT FOR SCREENING MAMMOGRAM: Primary | ICD-10-CM

## 2023-07-26 ENCOUNTER — HOSPITAL ENCOUNTER (OUTPATIENT)
Dept: CARDIOLOGY | Facility: HOSPITAL | Age: 83
Discharge: HOME | End: 2023-07-26
Admitting: INTERNAL MEDICINE
Payer: MEDICARE

## 2023-07-26 DIAGNOSIS — I35.0 AORTIC STENOSIS, SEVERE: ICD-10-CM

## 2023-07-26 DIAGNOSIS — I38 VALVULAR HEART DISEASE: Chronic | ICD-10-CM

## 2023-07-26 PROCEDURE — 93306 TTE W/DOPPLER COMPLETE: CPT

## 2023-07-26 PROCEDURE — 93306 TTE W/DOPPLER COMPLETE: CPT | Performed by: INTERNAL MEDICINE

## 2023-07-27 LAB
BH CV ECHO MEAS - AO MAX PG: 42.8 MMHG
BH CV ECHO MEAS - AO MEAN PG: 25 MMHG
BH CV ECHO MEAS - AO ROOT DIAM: 2.1 CM
BH CV ECHO MEAS - AO V2 MAX: 327 CM/SEC
BH CV ECHO MEAS - AO V2 VTI: 79.8 CM
BH CV ECHO MEAS - AVA(I,D): 0.67 CM2
BH CV ECHO MEAS - EDV(CUBED): 35.9 ML
BH CV ECHO MEAS - EDV(MOD-SP4): 32.9 ML
BH CV ECHO MEAS - EF(MOD-SP4): 51.1 %
BH CV ECHO MEAS - ESV(CUBED): 22 ML
BH CV ECHO MEAS - ESV(MOD-SP4): 16.1 ML
BH CV ECHO MEAS - FS: 15.2 %
BH CV ECHO MEAS - IVS/LVPW: 1.07 CM
BH CV ECHO MEAS - IVSD: 1.6 CM
BH CV ECHO MEAS - LA DIMENSION: 3.6 CM
BH CV ECHO MEAS - LAT PEAK E' VEL: 5.2 CM/SEC
BH CV ECHO MEAS - LV DIASTOLIC VOL/BSA (35-75): 18.8 CM2
BH CV ECHO MEAS - LV MASS(C)D: 188.8 GRAMS
BH CV ECHO MEAS - LV MAX PG: 5.5 MMHG
BH CV ECHO MEAS - LV MEAN PG: 3 MMHG
BH CV ECHO MEAS - LV SYSTOLIC VOL/BSA (12-30): 9.2 CM2
BH CV ECHO MEAS - LV V1 MAX: 117 CM/SEC
BH CV ECHO MEAS - LV V1 VTI: 30.2 CM
BH CV ECHO MEAS - LVIDD: 3.3 CM
BH CV ECHO MEAS - LVIDS: 2.8 CM
BH CV ECHO MEAS - LVOT AREA: 1.77 CM2
BH CV ECHO MEAS - LVOT DIAM: 1.5 CM
BH CV ECHO MEAS - LVPWD: 1.5 CM
BH CV ECHO MEAS - MED PEAK E' VEL: 5.6 CM/SEC
BH CV ECHO MEAS - MV A MAX VEL: 117 CM/SEC
BH CV ECHO MEAS - MV E MAX VEL: 99.4 CM/SEC
BH CV ECHO MEAS - MV E/A: 0.85
BH CV ECHO MEAS - PA ACC TIME: 0.11 SEC
BH CV ECHO MEAS - RAP SYSTOLE: 10 MMHG
BH CV ECHO MEAS - RVSP: 36.8 MMHG
BH CV ECHO MEAS - SI(MOD-SP4): 9.6 ML/M2
BH CV ECHO MEAS - SV(LVOT): 53.4 ML
BH CV ECHO MEAS - SV(MOD-SP4): 16.8 ML
BH CV ECHO MEAS - TR MAX PG: 26.8 MMHG
BH CV ECHO MEAS - TR MAX VEL: 259 CM/SEC
BH CV ECHO MEASUREMENTS AVERAGE E/E' RATIO: 18.41
LEFT ATRIUM VOLUME INDEX: 26.6 ML/M2

## 2023-08-01 ENCOUNTER — DOCUMENTATION (OUTPATIENT)
Dept: CARDIOLOGY | Facility: HOSPITAL | Age: 83
End: 2023-08-01
Payer: MEDICARE

## 2023-08-14 ENCOUNTER — HOSPITAL ENCOUNTER (OUTPATIENT)
Dept: BONE DENSITY | Facility: HOSPITAL | Age: 83
Discharge: HOME | End: 2023-08-14
Payer: MEDICARE

## 2023-08-14 ENCOUNTER — HOSPITAL ENCOUNTER (OUTPATIENT)
Dept: MAMMOGRAPHY | Facility: HOSPITAL | Age: 83
Discharge: HOME | End: 2023-08-14
Payer: MEDICARE

## 2023-08-14 DIAGNOSIS — Z12.31 VISIT FOR SCREENING MAMMOGRAM: ICD-10-CM

## 2023-08-14 DIAGNOSIS — Z78.0 ASYMPTOMATIC MENOPAUSAL STATE: ICD-10-CM

## 2023-08-14 PROCEDURE — 77080 DXA BONE DENSITY AXIAL: CPT

## 2023-08-14 PROCEDURE — 77067 SCR MAMMO BI INCL CAD: CPT | Performed by: RADIOLOGY

## 2023-08-14 PROCEDURE — 77080 DXA BONE DENSITY AXIAL: CPT | Performed by: RADIOLOGY

## 2023-08-14 PROCEDURE — 77063 BREAST TOMOSYNTHESIS BI: CPT | Performed by: RADIOLOGY

## 2023-08-14 PROCEDURE — 77063 BREAST TOMOSYNTHESIS BI: CPT

## 2023-08-14 PROCEDURE — 77067 SCR MAMMO BI INCL CAD: CPT

## 2023-08-24 ENCOUNTER — HOSPITAL ENCOUNTER (EMERGENCY)
Facility: HOSPITAL | Age: 83
Discharge: HOME | End: 2023-08-25
Attending: EMERGENCY MEDICINE
Payer: MEDICARE

## 2023-08-24 DIAGNOSIS — D64.9 CHRONIC ANEMIA: ICD-10-CM

## 2023-08-24 DIAGNOSIS — N30.00 ACUTE CYSTITIS WITHOUT HEMATURIA: Primary | ICD-10-CM

## 2023-08-24 PROCEDURE — 36415 COLL VENOUS BLD VENIPUNCTURE: CPT

## 2023-08-24 PROCEDURE — 99284 EMERGENCY DEPT VISIT MOD MDM: CPT

## 2023-08-25 ENCOUNTER — TRANSCRIBE ORDERS (OUTPATIENT)
Dept: ADMINISTRATIVE | Facility: HOSPITAL | Age: 83
End: 2023-08-25
Payer: MEDICARE

## 2023-08-25 ENCOUNTER — HOSPITAL ENCOUNTER (OUTPATIENT)
Facility: HOSPITAL | Age: 83
Discharge: HOME | End: 2023-08-28
Attending: INTERNAL MEDICINE | Admitting: INTERNAL MEDICINE
Payer: MEDICARE

## 2023-08-25 ENCOUNTER — APPOINTMENT (OUTPATIENT)
Dept: GENERAL RADIOLOGY | Facility: HOSPITAL | Age: 83
End: 2023-08-25
Payer: MEDICARE

## 2023-08-25 ENCOUNTER — TRANSCRIBE ORDERS (OUTPATIENT)
Dept: ONCOLOGY | Facility: HOSPITAL | Age: 83
End: 2023-08-25
Payer: MEDICARE

## 2023-08-25 ENCOUNTER — APPOINTMENT (OUTPATIENT)
Dept: CT IMAGING | Facility: HOSPITAL | Age: 83
End: 2023-08-25
Payer: MEDICARE

## 2023-08-25 VITALS
OXYGEN SATURATION: 97 % | WEIGHT: 169 LBS | HEART RATE: 69 BPM | BODY MASS INDEX: 29.95 KG/M2 | HEIGHT: 63 IN | RESPIRATION RATE: 18 BRPM | TEMPERATURE: 99 F | SYSTOLIC BLOOD PRESSURE: 140 MMHG | DIASTOLIC BLOOD PRESSURE: 58 MMHG

## 2023-08-25 DIAGNOSIS — D64.9 SYMPTOMATIC ANEMIA: Primary | ICD-10-CM

## 2023-08-25 DIAGNOSIS — D50.0 IRON DEFICIENCY ANEMIA SECONDARY TO BLOOD LOSS (CHRONIC): Primary | ICD-10-CM

## 2023-08-25 LAB
ABO GROUP BLD: NORMAL
ABO GROUP BLD: NORMAL
ALBUMIN SERPL-MCNC: 4.1 G/DL (ref 3.5–5.2)
ALBUMIN/GLOB SERPL: 1.3 G/DL
ALP SERPL-CCNC: 108 U/L (ref 39–117)
ALT SERPL W P-5'-P-CCNC: 10 U/L (ref 1–33)
ANION GAP SERPL CALCULATED.3IONS-SCNC: 10.3 MMOL/L (ref 5–15)
AST SERPL-CCNC: 14 U/L (ref 1–32)
BACTERIA UR QL AUTO: ABNORMAL /HPF
BASOPHILS # BLD AUTO: 0.03 10*3/MM3 (ref 0–0.2)
BASOPHILS NFR BLD AUTO: 0.5 % (ref 0–1.5)
BILIRUB SERPL-MCNC: 0.5 MG/DL (ref 0–1.2)
BILIRUB UR QL STRIP: NEGATIVE
BLD GP AB SCN SERPL QL: NEGATIVE
BUN SERPL-MCNC: 16 MG/DL (ref 8–23)
BUN/CREAT SERPL: 16 (ref 7–25)
CALCIUM SPEC-SCNC: 9.2 MG/DL (ref 8.6–10.5)
CHLORIDE SERPL-SCNC: 107 MMOL/L (ref 98–107)
CLARITY UR: ABNORMAL
CO2 SERPL-SCNC: 25.7 MMOL/L (ref 22–29)
COLOR UR: YELLOW
CREAT SERPL-MCNC: 1 MG/DL (ref 0.57–1)
DEPRECATED RDW RBC AUTO: 57.5 FL (ref 37–54)
EGFRCR SERPLBLD CKD-EPI 2021: 56 ML/MIN/1.73
EOSINOPHIL # BLD AUTO: 0.09 10*3/MM3 (ref 0–0.4)
EOSINOPHIL NFR BLD AUTO: 1.5 % (ref 0.3–6.2)
ERYTHROCYTE [DISTWIDTH] IN BLOOD BY AUTOMATED COUNT: 17.4 % (ref 12.3–15.4)
GEN 5 2HR TROPONIN T REFLEX: 12 NG/L
GLOBULIN UR ELPH-MCNC: 3.1 GM/DL
GLUCOSE SERPL-MCNC: 120 MG/DL (ref 65–99)
GLUCOSE UR STRIP-MCNC: NEGATIVE MG/DL
HCT VFR BLD AUTO: 25 % (ref 34–46.6)
HGB BLD-MCNC: 7 G/DL (ref 12–15.9)
HGB UR QL STRIP.AUTO: NEGATIVE
HOLD SPECIMEN: NORMAL
HOLD SPECIMEN: NORMAL
HYALINE CASTS UR QL AUTO: ABNORMAL /LPF
IMM GRANULOCYTES # BLD AUTO: 0.05 10*3/MM3 (ref 0–0.05)
IMM GRANULOCYTES NFR BLD AUTO: 0.8 % (ref 0–0.5)
IRON 24H UR-MRATE: 35 MCG/DL (ref 37–145)
IRON SATN MFR SERPL: 8 % (ref 20–50)
KETONES UR QL STRIP: NEGATIVE
LEUKOCYTE ESTERASE UR QL STRIP.AUTO: ABNORMAL
LYMPHOCYTES # BLD AUTO: 1.49 10*3/MM3 (ref 0.7–3.1)
LYMPHOCYTES NFR BLD AUTO: 24.1 % (ref 19.6–45.3)
MACROCYTES BLD QL SMEAR: NORMAL
MCH RBC QN AUTO: 27.5 PG (ref 26.6–33)
MCHC RBC AUTO-ENTMCNC: 28 G/DL (ref 31.5–35.7)
MCV RBC AUTO: 98 FL (ref 79–97)
MONOCYTES # BLD AUTO: 0.57 10*3/MM3 (ref 0.1–0.9)
MONOCYTES NFR BLD AUTO: 9.2 % (ref 5–12)
NEUTROPHILS NFR BLD AUTO: 3.96 10*3/MM3 (ref 1.7–7)
NEUTROPHILS NFR BLD AUTO: 63.9 % (ref 42.7–76)
NITRITE UR QL STRIP: POSITIVE
NRBC BLD AUTO-RTO: 0.3 /100 WBC (ref 0–0.2)
PH UR STRIP.AUTO: 7 [PH] (ref 5–8)
PLAT MORPH BLD: NORMAL
PLATELET # BLD AUTO: 254 10*3/MM3 (ref 140–450)
PMV BLD AUTO: 9.3 FL (ref 6–12)
POTASSIUM SERPL-SCNC: 4.2 MMOL/L (ref 3.5–5.2)
PROT SERPL-MCNC: 7.2 G/DL (ref 6–8.5)
PROT UR QL STRIP: NEGATIVE
QT INTERVAL: 394 MS
QTC INTERVAL: 449 MS
RBC # BLD AUTO: 2.55 10*6/MM3 (ref 3.77–5.28)
RBC # UR STRIP: ABNORMAL /HPF
REF LAB TEST METHOD: ABNORMAL
RH BLD: POSITIVE
RH BLD: POSITIVE
SODIUM SERPL-SCNC: 143 MMOL/L (ref 136–145)
SP GR UR STRIP: 1.02 (ref 1–1.03)
SQUAMOUS #/AREA URNS HPF: ABNORMAL /HPF
T&S EXPIRATION DATE: NORMAL
TIBC SERPL-MCNC: 462 MCG/DL (ref 298–536)
TRANSFERRIN SERPL-MCNC: 310 MG/DL (ref 200–360)
TROPONIN T DELTA: 0 NG/L
TROPONIN T SERPL HS-MCNC: 12 NG/L
UROBILINOGEN UR QL STRIP: ABNORMAL
WBC # UR STRIP: ABNORMAL /HPF
WBC NRBC COR # BLD: 6.19 10*3/MM3 (ref 3.4–10.8)
WHOLE BLOOD HOLD COAG: NORMAL
WHOLE BLOOD HOLD SPECIMEN: NORMAL

## 2023-08-25 PROCEDURE — 36430 TRANSFUSION BLD/BLD COMPNT: CPT

## 2023-08-25 PROCEDURE — 84466 ASSAY OF TRANSFERRIN: CPT | Performed by: INTERNAL MEDICINE

## 2023-08-25 PROCEDURE — 80053 COMPREHEN METABOLIC PANEL: CPT | Performed by: EMERGENCY MEDICINE

## 2023-08-25 PROCEDURE — G0379 DIRECT REFER HOSPITAL OBSERV: HCPCS

## 2023-08-25 PROCEDURE — 86900 BLOOD TYPING SEROLOGIC ABO: CPT

## 2023-08-25 PROCEDURE — 70450 CT HEAD/BRAIN W/O DYE: CPT | Performed by: RADIOLOGY

## 2023-08-25 PROCEDURE — G0378 HOSPITAL OBSERVATION PER HR: HCPCS

## 2023-08-25 PROCEDURE — 84484 ASSAY OF TROPONIN QUANT: CPT | Performed by: EMERGENCY MEDICINE

## 2023-08-25 PROCEDURE — 93005 ELECTROCARDIOGRAM TRACING: CPT | Performed by: EMERGENCY MEDICINE

## 2023-08-25 PROCEDURE — 63710000001 METOPROLOL TARTRATE 25 MG TABLET: Performed by: SURGERY

## 2023-08-25 PROCEDURE — 71045 X-RAY EXAM CHEST 1 VIEW: CPT

## 2023-08-25 PROCEDURE — 85025 COMPLETE CBC W/AUTO DIFF WBC: CPT | Performed by: EMERGENCY MEDICINE

## 2023-08-25 PROCEDURE — 86901 BLOOD TYPING SEROLOGIC RH(D): CPT | Performed by: INTERNAL MEDICINE

## 2023-08-25 PROCEDURE — A9270 NON-COVERED ITEM OR SERVICE: HCPCS | Performed by: SURGERY

## 2023-08-25 PROCEDURE — 86900 BLOOD TYPING SEROLOGIC ABO: CPT | Performed by: INTERNAL MEDICINE

## 2023-08-25 PROCEDURE — P9016 RBC LEUKOCYTES REDUCED: HCPCS

## 2023-08-25 PROCEDURE — 83540 ASSAY OF IRON: CPT | Performed by: INTERNAL MEDICINE

## 2023-08-25 PROCEDURE — 86850 RBC ANTIBODY SCREEN: CPT | Performed by: INTERNAL MEDICINE

## 2023-08-25 PROCEDURE — 86901 BLOOD TYPING SEROLOGIC RH(D): CPT

## 2023-08-25 PROCEDURE — 25010000002 CEFTRIAXONE PER 250 MG: Performed by: EMERGENCY MEDICINE

## 2023-08-25 PROCEDURE — 86923 COMPATIBILITY TEST ELECTRIC: CPT

## 2023-08-25 PROCEDURE — 85007 BL SMEAR W/DIFF WBC COUNT: CPT | Performed by: EMERGENCY MEDICINE

## 2023-08-25 PROCEDURE — 70450 CT HEAD/BRAIN W/O DYE: CPT

## 2023-08-25 PROCEDURE — 81001 URINALYSIS AUTO W/SCOPE: CPT | Performed by: EMERGENCY MEDICINE

## 2023-08-25 PROCEDURE — 71045 X-RAY EXAM CHEST 1 VIEW: CPT | Performed by: RADIOLOGY

## 2023-08-25 RX ORDER — CEFDINIR 300 MG/1
300 CAPSULE ORAL 2 TIMES DAILY
Status: CANCELLED | OUTPATIENT
Start: 2023-08-25 | End: 2023-09-01

## 2023-08-25 RX ORDER — AMOXICILLIN 250 MG
2 CAPSULE ORAL 2 TIMES DAILY
Status: DISCONTINUED | OUTPATIENT
Start: 2023-08-25 | End: 2023-08-28 | Stop reason: HOSPADM

## 2023-08-25 RX ORDER — BISACODYL 5 MG/1
5 TABLET, DELAYED RELEASE ORAL DAILY PRN
Status: DISCONTINUED | OUTPATIENT
Start: 2023-08-25 | End: 2023-08-28 | Stop reason: HOSPADM

## 2023-08-25 RX ORDER — SODIUM CHLORIDE 0.9 % (FLUSH) 0.9 %
10 SYRINGE (ML) INJECTION AS NEEDED
Status: DISCONTINUED | OUTPATIENT
Start: 2023-08-25 | End: 2023-08-28 | Stop reason: HOSPADM

## 2023-08-25 RX ORDER — LOSARTAN POTASSIUM 25 MG/1
25 TABLET ORAL DAILY
Status: CANCELLED | OUTPATIENT
Start: 2023-08-26

## 2023-08-25 RX ORDER — SODIUM CHLORIDE 0.9 % (FLUSH) 0.9 %
10 SYRINGE (ML) INJECTION EVERY 12 HOURS SCHEDULED
Status: DISCONTINUED | OUTPATIENT
Start: 2023-08-25 | End: 2023-08-28 | Stop reason: HOSPADM

## 2023-08-25 RX ORDER — POLYETHYLENE GLYCOL 3350 17 G/17G
17 POWDER, FOR SOLUTION ORAL DAILY PRN
Status: DISCONTINUED | OUTPATIENT
Start: 2023-08-25 | End: 2023-08-28 | Stop reason: HOSPADM

## 2023-08-25 RX ORDER — FUROSEMIDE 20 MG/1
20 TABLET ORAL 2 TIMES DAILY
COMMUNITY
End: 2023-08-31

## 2023-08-25 RX ORDER — CEFDINIR 300 MG/1
300 CAPSULE ORAL 2 TIMES DAILY
Qty: 14 CAPSULE | Refills: 0 | Status: SHIPPED | OUTPATIENT
Start: 2023-08-25 | End: 2023-08-28 | Stop reason: HOSPADM

## 2023-08-25 RX ORDER — LOSARTAN POTASSIUM 50 MG/1
25 TABLET ORAL DAILY
COMMUNITY

## 2023-08-25 RX ORDER — BISACODYL 10 MG
10 SUPPOSITORY, RECTAL RECTAL DAILY PRN
Status: DISCONTINUED | OUTPATIENT
Start: 2023-08-25 | End: 2023-08-28 | Stop reason: HOSPADM

## 2023-08-25 RX ORDER — SODIUM CHLORIDE 0.9 % (FLUSH) 0.9 %
10 SYRINGE (ML) INJECTION AS NEEDED
Status: DISCONTINUED | OUTPATIENT
Start: 2023-08-25 | End: 2023-08-25 | Stop reason: HOSPADM

## 2023-08-25 RX ORDER — SODIUM CHLORIDE 9 MG/ML
40 INJECTION, SOLUTION INTRAVENOUS AS NEEDED
Status: DISCONTINUED | OUTPATIENT
Start: 2023-08-25 | End: 2023-08-28 | Stop reason: HOSPADM

## 2023-08-25 RX ORDER — ASPIRIN 81 MG/1
324 TABLET, CHEWABLE ORAL ONCE
Status: DISCONTINUED | OUTPATIENT
Start: 2023-08-25 | End: 2023-08-25

## 2023-08-25 RX ORDER — ATORVASTATIN CALCIUM 20 MG/1
20 TABLET, FILM COATED ORAL DAILY
Status: DISCONTINUED | OUTPATIENT
Start: 2023-08-26 | End: 2023-08-28 | Stop reason: HOSPADM

## 2023-08-25 RX ORDER — PANTOPRAZOLE SODIUM 40 MG/1
40 TABLET, DELAYED RELEASE ORAL DAILY
Status: DISCONTINUED | OUTPATIENT
Start: 2023-08-26 | End: 2023-08-28 | Stop reason: HOSPADM

## 2023-08-25 RX ORDER — FUROSEMIDE 20 MG/1
20 TABLET ORAL 2 TIMES DAILY
Status: CANCELLED | OUTPATIENT
Start: 2023-08-25

## 2023-08-25 RX ORDER — LOSARTAN POTASSIUM 25 MG/1
25 TABLET ORAL DAILY
Status: DISCONTINUED | OUTPATIENT
Start: 2023-08-26 | End: 2023-08-28 | Stop reason: HOSPADM

## 2023-08-25 RX ORDER — PANTOPRAZOLE SODIUM 40 MG/1
40 TABLET, DELAYED RELEASE ORAL DAILY
COMMUNITY
End: 2023-08-29

## 2023-08-25 RX ADMIN — METOPROLOL TARTRATE 25 MG: 25 TABLET, FILM COATED ORAL at 21:06

## 2023-08-25 RX ADMIN — Medication 10 ML: at 21:14

## 2023-08-25 RX ADMIN — SODIUM CHLORIDE 2000 MG: 9 INJECTION, SOLUTION INTRAVENOUS at 03:41

## 2023-08-25 NOTE — PLAN OF CARE
Goal Outcome Evaluation: Received patient as a direct admit this afternoon. Patient remains on room air, saturations remaining above 90%. Patients family at bedside, updated on plan of care. Patient currently resting in bed. Will continue with plan of care.

## 2023-08-25 NOTE — DISCHARGE INSTRUCTIONS
Please go to the [facility] today, as discussed, for transfusion of 1 unit of packed red blood cells.    Keep appointment with Dr. Danilo Dickson as recommended by your PCP for additional outpatient work-up to include an EGD and colonoscopy.     Take the antibiotics prescribed as directed, drink plenty of fluids.    If any new/acute symptoms or worsening of current presentation, please go to the closest urgent care or emergency room for prompt reevaluation.

## 2023-08-25 NOTE — H&P
[facility] HOSPITALIST HISTORY AND PHYSICAL    Patient Identification:  Name:  Elizabeth Caceres  Age:  83 y.o.  Sex:  female  :  1940  MRN:  [id number]   Admit Date: 2023   Visit Number:  [id number]  Primary Care Physician:  Eric Wei MD       Chief complaint: Dizziness and palpitations    History of presenting illness: Ms. Caceres is an 83 y.o. female who presented to [facility] as a direct admission on 2023 with a chief complaint of dizziness and palpitations.  Patient has a past medical history remarkable for critical aortic stenosis status post bioprosthetic valve replacement, grade 1 diastolic CHF, history of TAVR, essential pretension, hyperlipidemia and longstanding history of urinary frequency.  Patient states that she began having symptoms of dizziness with palpitations and lightheadedness approximately 2 weeks ago.  Patient then went to see her PCP 1 week ago where she had basic laboratory work obtained.  Patient reports that she was told her hemoglobin was low at 6.7 at that time and was scheduled with outpatient GI services for EGD and colonoscopy.  Unfortunately, patient's symptoms persisted and as such did present to the emergency department on 2023 for further treatment and evaluation.  At that time, patient states that she was told she had a severe UTI and borderline low hemoglobin of 7.  Patient was reportedly given IV antibiotics in the emergency department and sent home with a prescription for oral antibiotics.  Unfortunately, this story cannot be corroborated as there is not an emergency room physician or APC note to review at this time.  According to the patient, she was told by emergency room physician that she could go home and then have outpatient infusion clinic arranged for 2023 where she could receive 1 unit PRBC transfusion.  Patient's family attempted to call the infusion clinic this morning but was told they could not  honor and emergency room providers order for infusion.  They were redirected back to patient's family doctor.  When patient's family called patient's family doctor, they were told she would have to be seen in their clinic once again.  Patient did go in person to her primary care provider's office on 8/25/2023 where lab work was reviewed and she was sent back to the hospital for infusion.  Unfortunately, there appears to be more miscommunication where patient was not given an order for outpatient infusion clinic transfusion.  As such, request was made for direct admission to facilitate blood transfusion.    -------------------------------------------------------------------------------------------------------------------  Past Medical History:   Diagnosis Date    Arthritis     Congestive heart failure due to valvular disease 7/5/2022    COPD (chronic obstructive pulmonary disease)     Hyperlipidemia     Hypertension     MRSA (methicillin resistant Staphylococcus aureus) 2007    History of MRSA with I&D of lower extremity    Urinary frequency      Past Surgical History:   Procedure Laterality Date    AORTIC VALVE REPAIR/REPLACEMENT  10/2013    Dr. Sebastian    AORTIC VALVE REPAIR/REPLACEMENT N/A 7/7/2022    Procedure: TRANSAORTIC TRANSCATHETER AORTIC VALVE REPLACEMENT;  Surgeon: Grayson Wade MD;  Location:  [facility];  Service: Cardiothoracic;  Laterality: N/A;  FL-7 m 42 s  Dose-357 mgy  Contrast- 50 ml isovue    AORTIC VALVE REPAIR/REPLACEMENT N/A 7/7/2022    Procedure: Transcatheter Aortic Valve Replacement;  Surgeon: Carlton Osman MD;  Location:  [facility];  Service: Cardiovascular;  Laterality: N/A;    APPENDECTOMY N/A 4/23/2019    Procedure: APPENDECTOMY LAPAROSCOPIC;  Surgeon: Peter Vargas MD;  Location: [facility] OR;  Service: General    CARDIAC CATHETERIZATION Left 5/19/2022    Procedure: Left Heart Cath;  Surgeon: Carlton Osman MD;  Location: [facility] CATH INVASIVE LOCATION;  Service: Cardiology;   Laterality: Left;  SAME DAY AS AXEL    CARDIAC SURGERY      COLONOSCOPY      HIP ARTHROPLASTY Left     TOTAL LAPAROSCOPIC HYSTERECTOMY SALPINGO OOPHORECTOMY Right 2019    Procedure: LAPAROSCOPIC SALPINGOOPHORECTOMY;  Surgeon: Peter Vargas MD;  Location: [facility];  Service: General     Family History   Problem Relation Age of Onset    Cancer Father     Heart disease Brother     Breast cancer Neg Hx      Social History     Socioeconomic History    Marital status:    Tobacco Use    Smoking status: Former     Types: Cigarettes     Quit date:      Years since quittin.6    Smokeless tobacco: Never   Vaping Use    Vaping Use: Never used   Substance and Sexual Activity    Alcohol use: No    Drug use: No    Sexual activity: Defer     ---------------------------------------------------------------------------------------------------------------------   Allergies:  Carvedilol  ---------------------------------------------------------------------------------------------------------------------   Prior to Admission Medications       Prescriptions Last Dose Informant Patient Reported? Taking?    apixaban (ELIQUIS) 2.5 MG tablet tablet 2023 Child, Pharmacy Yes Yes    Take 1 tablet by mouth 2 (Two) Times a Day.    atorvastatin (LIPITOR) 20 MG tablet 2023 Child, Pharmacy Yes Yes    Take 1 tablet by mouth Daily.    furosemide (LASIX) 20 MG tablet 2023 Pharmacy, Child Yes Yes    Take 1 tablet by mouth 2 (Two) Times a Day.    losartan (COZAAR) 50 MG tablet 2023  Yes Yes    Take 0.5 tablets by mouth Daily.    metoprolol tartrate (LOPRESSOR) 25 MG tablet 2023 Child Yes Yes    Take 1 tablet by mouth 2 (Two) Times a Day.    pantoprazole (PROTONIX) 40 MG EC tablet 2023 Child, Pharmacy Yes Yes    Take 1 tablet by mouth Daily.    cefdinir (OMNICEF) 300 MG capsule Unknown  No No    Take 1 capsule by mouth 2 (Two) Times a Day for 7 days.          Hospital Scheduled Meds:  senna-docusate  sodium, 2 tablet, Oral, BID  sodium chloride, 10 mL, Intravenous, Q12H         ---------------------------------------------------------------------------------------------------------------------   Review of Systems   On review of systems the patient denies the following unless noted above:     Constitutional:  Fevers, chills, weight change, fatigue     Eyes: Vision changes, headache, double vision, loss of vision     ENT: Runny nose, nose bleeds, ringing in ears, pain with swallowing, sore throat     Cardiovascular: Chest pains, palpitations, PND, orthopnea     Respiratory: Cough, wheezing, SOA, hemoptysis     GI:  Abdominal pain, diarrhea, constipation, change in stool caliber,    Rectal bleeding, vomiting or nausea     : Difficulty voiding, dysuria, hematuria     Musculoskeletal: Changes of any chronic joint pain, swelling     Skin: Rash, itching, easy bruisability     Neurological: Unilateral weakness, new onset numbness, speech difficulties     Psychiatric: Sadness, tearfulness, feelings of hopelessness, racing thoughts     Endocrine:  Heat or cold intolerance, mood swings, polydipsia, polyphagia,    recent hypoglycemia  --------------------------------------------------------------------------------------------------------------------  Vital Signs:  Temp:  [97.3 øF (36.3 øC)-99 øF (37.2 øC)] 97.3 øF (36.3 øC)  Heart Rate:  [65-87] 70  Resp:  [16-18] 18  BP: (127-160)/(51-73) 148/58  There were no vitals filed for this visit.  There is no height or weight on file to calculate BMI.  ---------------------------------------------------------------------------------------------------------------------   Physical exam:  Constitutional: Well-nourished  female in no apparent distress.     HENT:  Head:  Normocephalic and atraumatic.  Mouth:  Moist mucous membranes.    Eyes:  Conjunctivae and EOM are normal.  Pupils are equal, round, and reactive to light.  No scleral icterus.    Neck:  Neck supple. No  thyromegaly.  No JVD present.    Cardiovascular:  Regular rate and rhythm with no murmurs, rubs, clicks or gallops appreciated.  Pulmonary/Chest:  Clear to auscultation bilaterally with no crackles, wheezes or rhonchi appreciated.  Abdominal:  Soft. Nontender. Nondistended  Bowel sounds are normal in all four quadrants. No organomegally appreciated.   Musculoskeletal:  No edema, no tenderness, and no deformity.  No red or swollen joints anywhere.    Neurological:  Alert, Cranial nerves II-XII intact with no focal defecits.  No facial droop.  No slurred speech.   Skin:  Warm and dry to palpation with no rashes or lesions appreciated.  Peripheral vascular:  2+ radial and pedal pulses in bilateral upper and lower extremities.  Psychiatric:  Alert and oriented x3, demonstrates appropriate judgement and insight.  -------------------------------------------------------------------------------------------------  ---------------------------------------------------------------------------------------------------------------------   Results from last 7 days   Lab Units 08/25/23  0139   WBC 10*3/mm3 6.19   HEMOGLOBIN g/dL 7.0*   HEMATOCRIT % 25.0*   MCV fL 98.0*   MCHC g/dL 28.0*   PLATELETS 10*3/mm3 254         Results from last 7 days   Lab Units 08/25/23  0139   SODIUM mmol/L 143   POTASSIUM mmol/L 4.2   CHLORIDE mmol/L 107   CO2 mmol/L 25.7   BUN mg/dL 16   CREATININE mg/dL 1.00   CALCIUM mg/dL 9.2   GLUCOSE mg/dL 120*   ALBUMIN g/dL 4.1   BILIRUBIN mg/dL 0.5   ALK PHOS U/L 108   AST (SGOT) U/L 14   ALT (SGPT) U/L 10   Estimated Creatinine Clearance: 41.8 mL/min (by C-G formula based on SCr of 1 mg/dL).  No results found for: AMMONIA  Results from last 7 days   Lab Units 08/25/23  0407 08/25/23  0139   HSTROP T ng/L 12* 12*         Lab Results   Component Value Date    HGBA1C 4.90 09/10/2022     Lab Results   Component Value Date    TSH 2.850 09/09/2022     No results found for: PREGTESTUR, PREGSERUM, HCG, HCGQUANT  Pain  Management Panel          Latest Ref Rng & Units 7/7/2022   Pain Management Panel   Amphetamine, Urine Qual Negative Negative    Barbiturates Screen, Urine Negative Negative    Benzodiazepine Screen, Urine Negative Negative    Buprenorphine, Screen, Urine Negative Negative    Cocaine Screen, Urine Negative Negative    Methadone Screen , Urine Negative Negative    Methamphetamine, Ur Negative Negative      No results found for: BLOODCX  No results found for: URINECX  No results found for: WOUNDCX  No results found for: STOOLCX      ---------------------------------------------------------------------------------------------------------------------  Imaging Results (Last 7 Days)       ** No results found for the last 168 hours. **            I have personally reviewed the radiology images and read the final radiology report.  ---------------------------------------------------------------------------------------------------------------------  Assessment and Plan:    Symptomatic anemia -we will admit patient to telemetry, obtain type and screen and transfuse 1 unit PRBC.  We will repeat H&H 1 hour after completion of transfusion.  Will obtain iron panel and suspect patient has iron deficiency anemia.  Did discuss given patient's presentation, she likely has a slow GI bleed and would benefit from EGD/colonoscopy.  Did discuss if able to stabilize anemia, could potentially have this performed in an outpatient setting.  Patient expressed her frustration with recent turn of events and has requested to have anemia addressed with EGD/colonoscopy while inpatient.  As such, we will consult general surgery services for consideration of EGD/colonoscopy during this hospital stay.    2.  Essential hypertension -we will obtain home antihypertensive medications from pharmacy and restart once available    3.  Hyperlipidemia -statin    4.  History of aortic bioprosthetic valve -patient has history of aortic bioprosthetic valve (2013)  as well as TAVR (2022).  Patient was on Eliquis 2.5 mg p.o. twice daily at home.  Patient appears to have slow GI bleed, will discuss with cardiology services the utility and continuation of anticoagulation.    5.  Chronic HFpEF - no evidence of exacerbation at this time    Familia Santana DO  08/25/23  18:48 EDT

## 2023-08-26 LAB
ANION GAP SERPL CALCULATED.3IONS-SCNC: 8.5 MMOL/L (ref 5–15)
BH BB BLOOD EXPIRATION DATE: NORMAL
BH BB BLOOD TYPE BARCODE: 5100
BH BB DISPENSE STATUS: NORMAL
BH BB PRODUCT CODE: NORMAL
BH BB UNIT NUMBER: NORMAL
BUN SERPL-MCNC: 19 MG/DL (ref 8–23)
BUN/CREAT SERPL: 19.4 (ref 7–25)
CALCIUM SPEC-SCNC: 8.6 MG/DL (ref 8.6–10.5)
CHLORIDE SERPL-SCNC: 106 MMOL/L (ref 98–107)
CO2 SERPL-SCNC: 23.5 MMOL/L (ref 22–29)
CREAT SERPL-MCNC: 0.98 MG/DL (ref 0.57–1)
CROSSMATCH INTERPRETATION: NORMAL
DEPRECATED RDW RBC AUTO: 57.1 FL (ref 37–54)
EGFRCR SERPLBLD CKD-EPI 2021: 57.4 ML/MIN/1.73
ERYTHROCYTE [DISTWIDTH] IN BLOOD BY AUTOMATED COUNT: 17.4 % (ref 12.3–15.4)
GLUCOSE SERPL-MCNC: 97 MG/DL (ref 65–99)
HCT VFR BLD AUTO: 28.4 % (ref 34–46.6)
HGB BLD-MCNC: 8.2 G/DL (ref 12–15.9)
MCH RBC QN AUTO: 28.4 PG (ref 26.6–33)
MCHC RBC AUTO-ENTMCNC: 28.9 G/DL (ref 31.5–35.7)
MCV RBC AUTO: 98.3 FL (ref 79–97)
PLATELET # BLD AUTO: 203 10*3/MM3 (ref 140–450)
PMV BLD AUTO: 9.6 FL (ref 6–12)
POTASSIUM SERPL-SCNC: 4.4 MMOL/L (ref 3.5–5.2)
RBC # BLD AUTO: 2.89 10*6/MM3 (ref 3.77–5.28)
SODIUM SERPL-SCNC: 138 MMOL/L (ref 136–145)
UNIT  ABO: NORMAL
UNIT  RH: NORMAL
WBC NRBC COR # BLD: 6.1 10*3/MM3 (ref 3.4–10.8)

## 2023-08-26 PROCEDURE — 63710000001 FERROUS SULFATE 325 (65 FE) MG TABLET: Performed by: SURGERY

## 2023-08-26 PROCEDURE — 99204 OFFICE O/P NEW MOD 45 MIN: CPT | Performed by: SURGERY

## 2023-08-26 PROCEDURE — A9270 NON-COVERED ITEM OR SERVICE: HCPCS | Performed by: SURGERY

## 2023-08-26 PROCEDURE — 63710000001 METOPROLOL TARTRATE 25 MG TABLET: Performed by: SURGERY

## 2023-08-26 PROCEDURE — G0378 HOSPITAL OBSERVATION PER HR: HCPCS

## 2023-08-26 PROCEDURE — 63710000001 PANTOPRAZOLE 40 MG TABLET DELAYED-RELEASE: Performed by: SURGERY

## 2023-08-26 PROCEDURE — 80048 BASIC METABOLIC PNL TOTAL CA: CPT | Performed by: INTERNAL MEDICINE

## 2023-08-26 PROCEDURE — 85027 COMPLETE CBC AUTOMATED: CPT | Performed by: INTERNAL MEDICINE

## 2023-08-26 PROCEDURE — 63710000001 LOSARTAN 25 MG TABLET: Performed by: SURGERY

## 2023-08-26 RX ORDER — FERROUS SULFATE 325(65) MG
325 TABLET ORAL
Status: DISCONTINUED | OUTPATIENT
Start: 2023-08-26 | End: 2023-08-28 | Stop reason: HOSPADM

## 2023-08-26 RX ADMIN — DOCUSATE SODIUM 50 MG AND SENNOSIDES 8.6 MG 2 TABLET: 8.6; 5 TABLET, FILM COATED ORAL at 08:07

## 2023-08-26 RX ADMIN — LOSARTAN POTASSIUM 25 MG: 25 TABLET, FILM COATED ORAL at 08:07

## 2023-08-26 RX ADMIN — FERROUS SULFATE TAB 325 MG (65 MG ELEMENTAL FE) 325 MG: 325 (65 FE) TAB at 15:00

## 2023-08-26 RX ADMIN — Medication 10 ML: at 08:08

## 2023-08-26 RX ADMIN — DOCUSATE SODIUM 50 MG AND SENNOSIDES 8.6 MG 2 TABLET: 8.6; 5 TABLET, FILM COATED ORAL at 20:59

## 2023-08-26 RX ADMIN — METOPROLOL TARTRATE 25 MG: 25 TABLET, FILM COATED ORAL at 08:07

## 2023-08-26 RX ADMIN — ATORVASTATIN CALCIUM 20 MG: 20 TABLET, FILM COATED ORAL at 08:07

## 2023-08-26 RX ADMIN — PANTOPRAZOLE SODIUM 40 MG: 40 TABLET, DELAYED RELEASE ORAL at 08:07

## 2023-08-26 RX ADMIN — METOPROLOL TARTRATE 25 MG: 25 TABLET, FILM COATED ORAL at 20:59

## 2023-08-26 RX ADMIN — Medication 10 ML: at 20:59

## 2023-08-26 NOTE — H&P (VIEW-ONLY)
Consulting physician:  Dr. Black    Referring physician: Dr. Santana    Date of consultation: 08/26/23     Chief complaint chronic anemia    Subjective     Patient is a 83 y.o. female who presented to [facility] as a direct admission on 8/25/2023 with a chief complaint of dizziness and palpitations. Patient has a past medical history remarkable for critical aortic stenosis status post bioprosthetic valve replacement, grade 1 diastolic CHF, history of TAVR, essential pretension, hyperlipidemia and longstanding history of urinary frequency. Patient states that she began having symptoms of dizziness with palpitations and lightheadedness approximately 2 weeks ago. Patient then went to see her PCP 1 week ago where she had basic laboratory work obtained. Patient reports that she was told her hemoglobin was low at 6.7 at that time and was scheduled with outpatient GI services for EGD and colonoscopy. Unfortunately, patient's symptoms persisted and as such did present to the emergency department on 8/24/2023 for further treatment and evaluation. At that time, patient states that she was told she had a severe UTI and borderline low hemoglobin of 7. Patient was reportedly given IV antibiotics in the emergency department and sent home with a prescription for oral antibiotics.   Patient's daughter states there is a history of dark stools but the patient states she felt that this was due to her vitamins.  There is no blood in the stool.  There is no history of peptic ulcer disease.  There is no abdominal pain.  Patient did have a colonoscopy with Dr. Mcdonnell in 2012        Review of Systems  Review of Systems - General ROS: negative for - weight loss  Psychological ROS: negative for - behavioral disorder  Ophthalmic ROS: negative for - dry eyes  ENT ROS: negative for - vertigo or vocal changes  Hematological and Lymphatic ROS: negative for - swollen lymph nodes, DVT, PE.   Respiratory ROS: negative for - sputum  changes or stridor.  Cardiovascular ROS: negative for - irregular heartbeat or murmur  Gastrointestinal ROS: negative for - blood in stools or change in stools  Genitourinary ROS: negative for - hematuria or incontinence  Musculoskeletal ROS: negative for - gait disturbance      History  Past Medical History:   Diagnosis Date    Arthritis     Congestive heart failure due to valvular disease 7/5/2022    COPD (chronic obstructive pulmonary disease)     Hyperlipidemia     Hypertension     MRSA (methicillin resistant Staphylococcus aureus) 2007    History of MRSA with I&D of lower extremity    Urinary frequency      Past Surgical History:   Procedure Laterality Date    AORTIC VALVE REPAIR/REPLACEMENT  10/2013    Dr. Sebastian    AORTIC VALVE REPAIR/REPLACEMENT N/A 7/7/2022    Procedure: TRANSAORTIC TRANSCATHETER AORTIC VALVE REPLACEMENT;  Surgeon: Grayson Wade MD;  Location:  [facility];  Service: Cardiothoracic;  Laterality: N/A;  FL-7 m 42 s  Dose-357 mgy  Contrast- 50 ml isovue    AORTIC VALVE REPAIR/REPLACEMENT N/A 7/7/2022    Procedure: Transcatheter Aortic Valve Replacement;  Surgeon: Carlton Osman MD;  Location:  [facility];  Service: Cardiovascular;  Laterality: N/A;    APPENDECTOMY N/A 4/23/2019    Procedure: APPENDECTOMY LAPAROSCOPIC;  Surgeon: Peter Vargas MD;  Location: [facility];  Service: General    CARDIAC CATHETERIZATION Left 5/19/2022    Procedure: Left Heart Cath;  Surgeon: Carlton Osman MD;  Location:  [facility] CATH INVASIVE LOCATION;  Service: Cardiology;  Laterality: Left;  SAME DAY AS AXEL    CARDIAC SURGERY      COLONOSCOPY      HIP ARTHROPLASTY Left     TOTAL LAPAROSCOPIC HYSTERECTOMY SALPINGO OOPHORECTOMY Right 4/23/2019    Procedure: LAPAROSCOPIC SALPINGOOPHORECTOMY;  Surgeon: Peter Vargas MD;  Location: [facility] OR;  Service: General     Family History   Problem Relation Age of Onset    Cancer Father     Heart disease Brother     Breast cancer Neg Hx      Social History     Tobacco  Use    Smoking status: Former     Types: Cigarettes     Quit date:      Years since quittin.6    Smokeless tobacco: Never   Vaping Use    Vaping Use: Never used   Substance Use Topics    Alcohol use: No    Drug use: No     Medications Prior to Admission   Medication Sig Dispense Refill Last Dose    apixaban (ELIQUIS) 2.5 MG tablet tablet Take 1 tablet by mouth 2 (Two) Times a Day.   2023    atorvastatin (LIPITOR) 20 MG tablet Take 1 tablet by mouth Daily.   2023    furosemide (LASIX) 20 MG tablet Take 1 tablet by mouth 2 (Two) Times a Day.   2023    losartan (COZAAR) 50 MG tablet Take 0.5 tablets by mouth Daily.   2023    metoprolol tartrate (LOPRESSOR) 25 MG tablet Take 1 tablet by mouth 2 (Two) Times a Day.   2023    pantoprazole (PROTONIX) 40 MG EC tablet Take 1 tablet by mouth Daily.   2023    cefdinir (OMNICEF) 300 MG capsule Take 1 capsule by mouth 2 (Two) Times a Day for 7 days. 14 capsule 0 Unknown     Allergies:  Carvedilol    Objective     Vital Signs  Temp:  [97.3 øF (36.3 øC)-98.6 øF (37 øC)] 98.2 øF (36.8 øC)  Heart Rate:  [59-83] 83  Resp:  [18] 18  BP: (112-148)/(46-70) 112/60    Physical Exam:  General:  This is a WD WN female in no acute distress  Vital signs: Stable, afebrile  HEENT exam:  WNL. Sclerae are anicteric.  EOMI  Neck:  Supple, FROM.  No JVD.  Trachea midline  Lungs:  Respiratory effort normal. Auscultation: Clear, without wheezes, rhonchi, rales  Heart:  Regular rate and rhythm, without murmur, gallop, rub.  No pedal edema  Abdomen: Bowel sounds are present.  Abdomen soft, nontender  Musculoskeletal:  Muscle strength/tone is normal.    Psych:  Alert, oriented x 3.  Mood and affect are appropriate  Skin:  Warm with good turgor.  Without rash or lesion  Extremities:  Examination of the extremities revealed no cyanosis, clubbing or edema.    Results Review:   Results from last 7 days   Lab Units 23  0407 23  0139   HSTROP T ng/L 12* 12*      Results from last 7 days   Lab Units 08/26/23  0428 08/25/23  0139   WBC 10*3/mm3 6.10 6.19   HEMOGLOBIN g/dL 8.2* 7.0*   HEMATOCRIT % 28.4* 25.0*   PLATELETS 10*3/mm3 203 254         Results from last 7 days   Lab Units 08/26/23  0428 08/25/23  0139   SODIUM mmol/L 138 143   POTASSIUM mmol/L 4.4 4.2   CHLORIDE mmol/L 106 107   CO2 mmol/L 23.5 25.7   BUN mg/dL 19 16   CREATININE mg/dL 0.98 1.00   CALCIUM mg/dL 8.6 9.2   GLUCOSE mg/dL 97 120*   ALBUMIN g/dL  --  4.1   BILIRUBIN mg/dL  --  0.5   ALK PHOS U/L  --  108   AST (SGOT) U/L  --  14   ALT (SGPT) U/L  --  10   Estimated Creatinine Clearance: 43.4 mL/min (by C-G formula based on SCr of 0.98 mg/dL).  No results found for: AMMONIA      No results found for: BLOODCX  No results found for: URINECX  No results found for: WOUNDCX  No results found for: STOOLCX    Imaging:  Imaging Results (Last 24 Hours)       ** No results found for the last 24 hours. **              Impression:  Patient Active Problem List   Diagnosis Code    Valvular heart disease I38    HTN I10    Vitamin D deficiency E55.9    Vitamin C deficiency E54    H/O syncope Z87.898    Hyperlipidemia E78.5    COPD J44.9    Severe AS s/p bioprothetic valve in 2013  I35.0    CHF  I50.9, I38    Former tobacco use Z87.891    Chest pain, unspecified type R07.9    Symptomatic anemia D64.9     Impression: Chronic anemia with no clear evidence for GI source of blood loss    Plan:  As patient's daughter is extremely frustrated with not being able to coordinate outpatient care in a timely manner, we will plan for bowel prep tomorrow.  EGD and colonoscopy with Dr. Martha paiz on Monday      Discussion:      Kaela Black MD  08/26/23  13:40 EDT    Time: Time spent:    Please note that portions of this note were completed with a voice recognition program.

## 2023-08-26 NOTE — PROGRESS NOTES
[facility]     Patient Identification:  Name:  Elizabeth Caceres  Age:  83 y.o.  Sex:  female  :  1940  MRN:  [id number]  Visit Number:  [id number]  Primary Care Provider:  Eric Wei MD    Length of stay:  0    Chief complaint:  None    Subjective:    Patient seen and examined at bedside with patient's daughter present.  Patient states she is feeling well today and has no complaints at all.  She specifically denies dizziness, lightheadedness, palpitations, nausea, vomiting or any other symptoms at this time.    ----------------------------------------------------------------------------------------------------------------------  Current Hospital Meds:  atorvastatin, 20 mg, Oral, Daily  ferrous sulfate, 325 mg, Oral, Daily With Breakfast  losartan, 25 mg, Oral, Daily  metoprolol tartrate, 25 mg, Oral, BID  pantoprazole, 40 mg, Oral, Daily  senna-docusate sodium, 2 tablet, Oral, BID  sodium chloride, 10 mL, Intravenous, Q12H         ----------------------------------------------------------------------------------------------------------------------  Vital Signs:  Temp:  [97.3 øF (36.3 øC)-98.6 øF (37 øC)] 98.2 øF (36.8 øC)  Heart Rate:  [59-83] 83  Resp:  [18] 18  BP: (112-148)/(46-70) 112/60      23  0500   Weight: 79.3 kg (174 lb 12.8 oz)     Body mass index is 30.96 kg/mý.    Intake/Output Summary (Last 24 hours) at 2023 1245  Last data filed at 2023 0800  Gross per 24 hour   Intake 1020 ml   Output --   Net 1020 ml     Diet: Liquid Diets; Clear Liquid; Texture: Regular Texture (IDDSI 7); Fluid Consistency: Thin (IDDSI 0)  ----------------------------------------------------------------------------------------------------------------------  Physical exam:  Constitutional: Well-nourished  female in no apparent distress.     HENT:  Head:  Normocephalic and atraumatic.  Mouth:  Moist mucous membranes.    Eyes:  Conjunctivae and EOM  are normal.  Pupils are equal, round, and reactive to light.  No scleral icterus.    Neck:  Neck supple. No thyromegaly.  No JVD present.    Cardiovascular:  Regular rate and rhythm with no murmurs, rubs, clicks or gallops appreciated.  Pulmonary/Chest:  Clear to auscultation bilaterally with no crackles, wheezes or rhonchi appreciated.  Abdominal:  Soft. Nontender. Nondistended  Bowel sounds are normal in all four quadrants. No organomegally appreciated.   Musculoskeletal: No edema, no tenderness, and no deformity.  No red or swollen joints anywhere.    Neurological:  Alert, Cranial nerves II-XII intact with no focal deficits.  No facial droop.  No slurred speech.   Skin:  Warm and dry to palpation with no rashes or lesions appreciated.  Peripheral vascular:  2+ radial and pedal pulses in bilateral upper and lower extremities.  Psychiatric:  Alert and oriented x3, demonstrates appropriate judgment and insight.  -------------------------------------------------------------------------------------------------  ----------------------------------------------------------------------------------------------------------------------  Results from last 7 days   Lab Units 08/25/23  0407 08/25/23  0139   HSTROP T ng/L 12* 12*     Results from last 7 days   Lab Units 08/26/23  0428 08/25/23  0139   WBC 10*3/mm3 6.10 6.19   HEMOGLOBIN g/dL 8.2* 7.0*   HEMATOCRIT % 28.4* 25.0*   MCV fL 98.3* 98.0*   MCHC g/dL 28.9* 28.0*   PLATELETS 10*3/mm3 203 254         Results from last 7 days   Lab Units 08/26/23  0428 08/25/23  0139   SODIUM mmol/L 138 143   POTASSIUM mmol/L 4.4 4.2   CHLORIDE mmol/L 106 107   CO2 mmol/L 23.5 25.7   BUN mg/dL 19 16   CREATININE mg/dL 0.98 1.00   CALCIUM mg/dL 8.6 9.2   GLUCOSE mg/dL 97 120*   ALBUMIN g/dL  --  4.1   BILIRUBIN mg/dL  --  0.5   ALK PHOS U/L  --  108   AST (SGOT) U/L  --  14   ALT (SGPT) U/L  --  10   Estimated Creatinine Clearance: 43.4 mL/min (by C-G formula based on SCr of 0.98  mg/dL).    No results found for: AMMONIA      No results found for: BLOODCX  No results found for: URINECX  No results found for: WOUNDCX  No results found for: STOOLCX    I have personally looked at the labs and they are summarized above.  ----------------------------------------------------------------------------------------------------------------------  Imaging Results (Last 24 Hours)       ** No results found for the last 24 hours. **          ----------------------------------------------------------------------------------------------------------------------  Assessment and Plan:    Symptomatic anemia - resolved with 1 unit PRBC transfusion.  Will await further recommendations from general surgery services regarding timing of EGD and colonoscopy.       2.  Essential hypertension -well-controlled on current antihypertensive medications.  Continue to monitor closely and make medication adjustments as necessary.     3.  Hyperlipidemia -statin     4.  History of aortic bioprosthetic valve -patient has history of aortic bioprosthetic valve (2013) as well as TAVR (2022).  Patient was on Eliquis 2.5 mg p.o. twice daily at home.  Patient appears to have slow GI bleed, will discuss with cardiology services the utility and continuation of anticoagulation.     5.  Chronic HFpEF - no evidence of exacerbation at this time    Disposition Pending further general surgery recommendations    Familia Santana,    08/26/23   12:45 EDT

## 2023-08-26 NOTE — PLAN OF CARE
Goal Outcome Evaluation: Patient has been very. Compliant with care. Patient remains on room air, saturations remaining above 90%. Patients family at bedside, updated on plan of care. Patient ambulated in room, steady gait noted. Patient currently resting in bed. Will continue with plan of care.

## 2023-08-26 NOTE — PLAN OF CARE
Pt received 1 unit of PRBCs, no symptoms of transfusion reaction noted. Recheck Hgb ordered for 0500. Daughter remains at bedside. No complaints or symptoms of distress noted. Continue plan of care.

## 2023-08-26 NOTE — CONSULTS
Consulting physician:  Dr. Black    Referring physician: Dr. Santana    Date of consultation: 08/26/23     Chief complaint chronic anemia    Subjective     Patient is a 83 y.o. female who presented to [facility] as a direct admission on 8/25/2023 with a chief complaint of dizziness and palpitations. Patient has a past medical history remarkable for critical aortic stenosis status post bioprosthetic valve replacement, grade 1 diastolic CHF, history of TAVR, essential pretension, hyperlipidemia and longstanding history of urinary frequency. Patient states that she began having symptoms of dizziness with palpitations and lightheadedness approximately 2 weeks ago. Patient then went to see her PCP 1 week ago where she had basic laboratory work obtained. Patient reports that she was told her hemoglobin was low at 6.7 at that time and was scheduled with outpatient GI services for EGD and colonoscopy. Unfortunately, patient's symptoms persisted and as such did present to the emergency department on 8/24/2023 for further treatment and evaluation. At that time, patient states that she was told she had a severe UTI and borderline low hemoglobin of 7. Patient was reportedly given IV antibiotics in the emergency department and sent home with a prescription for oral antibiotics.   Patient's daughter states there is a history of dark stools but the patient states she felt that this was due to her vitamins.  There is no blood in the stool.  There is no history of peptic ulcer disease.  There is no abdominal pain.  Patient did have a colonoscopy with Dr. Mcdonnell in 2012        Review of Systems  Review of Systems - General ROS: negative for - weight loss  Psychological ROS: negative for - behavioral disorder  Ophthalmic ROS: negative for - dry eyes  ENT ROS: negative for - vertigo or vocal changes  Hematological and Lymphatic ROS: negative for - swollen lymph nodes, DVT, PE.   Respiratory ROS: negative for - sputum  changes or stridor.  Cardiovascular ROS: negative for - irregular heartbeat or murmur  Gastrointestinal ROS: negative for - blood in stools or change in stools  Genitourinary ROS: negative for - hematuria or incontinence  Musculoskeletal ROS: negative for - gait disturbance      History  Past Medical History:   Diagnosis Date    Arthritis     Congestive heart failure due to valvular disease 7/5/2022    COPD (chronic obstructive pulmonary disease)     Hyperlipidemia     Hypertension     MRSA (methicillin resistant Staphylococcus aureus) 2007    History of MRSA with I&D of lower extremity    Urinary frequency      Past Surgical History:   Procedure Laterality Date    AORTIC VALVE REPAIR/REPLACEMENT  10/2013    Dr. Sebastian    AORTIC VALVE REPAIR/REPLACEMENT N/A 7/7/2022    Procedure: TRANSAORTIC TRANSCATHETER AORTIC VALVE REPLACEMENT;  Surgeon: Grayson Wade MD;  Location:  [facility];  Service: Cardiothoracic;  Laterality: N/A;  FL-7 m 42 s  Dose-357 mgy  Contrast- 50 ml isovue    AORTIC VALVE REPAIR/REPLACEMENT N/A 7/7/2022    Procedure: Transcatheter Aortic Valve Replacement;  Surgeon: Carlton Osman MD;  Location:  [facility];  Service: Cardiovascular;  Laterality: N/A;    APPENDECTOMY N/A 4/23/2019    Procedure: APPENDECTOMY LAPAROSCOPIC;  Surgeon: Peter Vargas MD;  Location: [facility];  Service: General    CARDIAC CATHETERIZATION Left 5/19/2022    Procedure: Left Heart Cath;  Surgeon: Carlton Osman MD;  Location:  [facility];  Service: Cardiology;  Laterality: Left;  SAME DAY AS AXEL    CARDIAC SURGERY      COLONOSCOPY      HIP ARTHROPLASTY Left     TOTAL LAPAROSCOPIC HYSTERECTOMY SALPINGO OOPHORECTOMY Right 4/23/2019    Procedure: LAPAROSCOPIC SALPINGOOPHORECTOMY;  Surgeon: Peter Vargas MD;  Location: [facility];  Service: General     Family History   Problem Relation Age of Onset    Cancer Father     Heart disease Brother     Breast cancer Neg Hx      Social History     Tobacco  Use    Smoking status: Former     Types: Cigarettes     Quit date:      Years since quittin.6    Smokeless tobacco: Never   Vaping Use    Vaping Use: Never used   Substance Use Topics    Alcohol use: No    Drug use: No     Medications Prior to Admission   Medication Sig Dispense Refill Last Dose    apixaban (ELIQUIS) 2.5 MG tablet tablet Take 1 tablet by mouth 2 (Two) Times a Day.   2023    atorvastatin (LIPITOR) 20 MG tablet Take 1 tablet by mouth Daily.   2023    furosemide (LASIX) 20 MG tablet Take 1 tablet by mouth 2 (Two) Times a Day.   2023    losartan (COZAAR) 50 MG tablet Take 0.5 tablets by mouth Daily.   2023    metoprolol tartrate (LOPRESSOR) 25 MG tablet Take 1 tablet by mouth 2 (Two) Times a Day.   2023    pantoprazole (PROTONIX) 40 MG EC tablet Take 1 tablet by mouth Daily.   2023    cefdinir (OMNICEF) 300 MG capsule Take 1 capsule by mouth 2 (Two) Times a Day for 7 days. 14 capsule 0 Unknown     Allergies:  Carvedilol    Objective     Vital Signs  Temp:  [97.3 øF (36.3 øC)-98.6 øF (37 øC)] 98.2 øF (36.8 øC)  Heart Rate:  [59-83] 83  Resp:  [18] 18  BP: (112-148)/(46-70) 112/60    Physical Exam:  General:  This is a WD WN female in no acute distress  Vital signs: Stable, afebrile  HEENT exam:  WNL. Sclerae are anicteric.  EOMI  Neck:  Supple, FROM.  No JVD.  Trachea midline  Lungs:  Respiratory effort normal. Auscultation: Clear, without wheezes, rhonchi, rales  Heart:  Regular rate and rhythm, without murmur, gallop, rub.  No pedal edema  Abdomen: Bowel sounds are present.  Abdomen soft, nontender  Musculoskeletal:  Muscle strength/tone is normal.    Psych:  Alert, oriented x 3.  Mood and affect are appropriate  Skin:  Warm with good turgor.  Without rash or lesion  Extremities:  Examination of the extremities revealed no cyanosis, clubbing or edema.    Results Review:   Results from last 7 days   Lab Units 23  0407 23  0139   HSTROP T ng/L 12* 12*      Results from last 7 days   Lab Units 08/26/23  0428 08/25/23  0139   WBC 10*3/mm3 6.10 6.19   HEMOGLOBIN g/dL 8.2* 7.0*   HEMATOCRIT % 28.4* 25.0*   PLATELETS 10*3/mm3 203 254         Results from last 7 days   Lab Units 08/26/23  0428 08/25/23  0139   SODIUM mmol/L 138 143   POTASSIUM mmol/L 4.4 4.2   CHLORIDE mmol/L 106 107   CO2 mmol/L 23.5 25.7   BUN mg/dL 19 16   CREATININE mg/dL 0.98 1.00   CALCIUM mg/dL 8.6 9.2   GLUCOSE mg/dL 97 120*   ALBUMIN g/dL  --  4.1   BILIRUBIN mg/dL  --  0.5   ALK PHOS U/L  --  108   AST (SGOT) U/L  --  14   ALT (SGPT) U/L  --  10   Estimated Creatinine Clearance: 43.4 mL/min (by C-G formula based on SCr of 0.98 mg/dL).  No results found for: AMMONIA      No results found for: BLOODCX  No results found for: URINECX  No results found for: WOUNDCX  No results found for: STOOLCX    Imaging:  Imaging Results (Last 24 Hours)       ** No results found for the last 24 hours. **              Impression:  Patient Active Problem List   Diagnosis Code    Valvular heart disease I38    HTN I10    Vitamin D deficiency E55.9    Vitamin C deficiency E54    H/O syncope Z87.898    Hyperlipidemia E78.5    COPD J44.9    Severe AS s/p bioprothetic valve in 2013  I35.0    CHF  I50.9, I38    Former tobacco use Z87.891    Chest pain, unspecified type R07.9    Symptomatic anemia D64.9     Impression: Chronic anemia with no clear evidence for GI source of blood loss    Plan:  As patient's daughter is extremely frustrated with not being able to coordinate outpatient care in a timely manner, we will plan for bowel prep tomorrow.  EGD and colonoscopy with Dr. Martha paiz on Monday      Discussion:      Kaela Black MD  08/26/23  13:40 EDT    Time: Time spent:    Please note that portions of this note were completed with a voice recognition program.

## 2023-08-27 LAB
DEPRECATED RDW RBC AUTO: 60.1 FL (ref 37–54)
ERYTHROCYTE [DISTWIDTH] IN BLOOD BY AUTOMATED COUNT: 17.8 % (ref 12.3–15.4)
HCT VFR BLD AUTO: 31.1 % (ref 34–46.6)
HGB BLD-MCNC: 9.1 G/DL (ref 12–15.9)
MCH RBC QN AUTO: 28.5 PG (ref 26.6–33)
MCHC RBC AUTO-ENTMCNC: 29.3 G/DL (ref 31.5–35.7)
MCV RBC AUTO: 97.5 FL (ref 79–97)
PLATELET # BLD AUTO: 211 10*3/MM3 (ref 140–450)
PMV BLD AUTO: 9.7 FL (ref 6–12)
RBC # BLD AUTO: 3.19 10*6/MM3 (ref 3.77–5.28)
WBC NRBC COR # BLD: 6.15 10*3/MM3 (ref 3.4–10.8)

## 2023-08-27 PROCEDURE — G0378 HOSPITAL OBSERVATION PER HR: HCPCS

## 2023-08-27 PROCEDURE — 85027 COMPLETE CBC AUTOMATED: CPT | Performed by: INTERNAL MEDICINE

## 2023-08-27 PROCEDURE — 99222 1ST HOSP IP/OBS MODERATE 55: CPT | Performed by: INTERNAL MEDICINE

## 2023-08-27 PROCEDURE — A9270 NON-COVERED ITEM OR SERVICE: HCPCS | Performed by: SURGERY

## 2023-08-27 PROCEDURE — 63710000001 POLYETHYLENE GLYCOL 17 GM/SCOOP POWDER 238 G BOTTLE: Performed by: SURGERY

## 2023-08-27 RX ORDER — POLYETHYLENE GLYCOL 3350 17 G/17G
1 POWDER, FOR SOLUTION ORAL 2 TIMES DAILY
Status: COMPLETED | OUTPATIENT
Start: 2023-08-27 | End: 2023-08-27

## 2023-08-27 RX ORDER — POLYETHYLENE GLYCOL 3350 17 G/17G
1 POWDER, FOR SOLUTION ORAL 2 TIMES DAILY
Status: DISCONTINUED | OUTPATIENT
Start: 2023-08-27 | End: 2023-08-27

## 2023-08-27 RX ORDER — ACETAMINOPHEN 325 MG/1
650 TABLET ORAL EVERY 6 HOURS PRN
Status: DISCONTINUED | OUTPATIENT
Start: 2023-08-27 | End: 2023-08-28 | Stop reason: HOSPADM

## 2023-08-27 RX ADMIN — METOPROLOL TARTRATE 25 MG: 25 TABLET, FILM COATED ORAL at 20:11

## 2023-08-27 RX ADMIN — ATORVASTATIN CALCIUM 20 MG: 20 TABLET, FILM COATED ORAL at 08:14

## 2023-08-27 RX ADMIN — LOSARTAN POTASSIUM 25 MG: 25 TABLET, FILM COATED ORAL at 08:14

## 2023-08-27 RX ADMIN — Medication 1 BOTTLE: at 17:37

## 2023-08-27 RX ADMIN — Medication 1 BOTTLE: at 22:32

## 2023-08-27 RX ADMIN — DOCUSATE SODIUM 50 MG AND SENNOSIDES 8.6 MG 2 TABLET: 8.6; 5 TABLET, FILM COATED ORAL at 20:11

## 2023-08-27 RX ADMIN — FERROUS SULFATE TAB 325 MG (65 MG ELEMENTAL FE) 325 MG: 325 (65 FE) TAB at 08:15

## 2023-08-27 RX ADMIN — Medication 10 ML: at 20:12

## 2023-08-27 RX ADMIN — DOCUSATE SODIUM 50 MG AND SENNOSIDES 8.6 MG 2 TABLET: 8.6; 5 TABLET, FILM COATED ORAL at 08:14

## 2023-08-27 RX ADMIN — METOPROLOL TARTRATE 25 MG: 25 TABLET, FILM COATED ORAL at 08:15

## 2023-08-27 RX ADMIN — PANTOPRAZOLE SODIUM 40 MG: 40 TABLET, DELAYED RELEASE ORAL at 08:14

## 2023-08-27 NOTE — PROGRESS NOTES
[facility]     Patient Identification:  Name:  Elizabeth Caceres  Age:  83 y.o.  Sex:  female  :  1940  MRN:  [id number]  Visit Number:  [id number]  Primary Care Provider:  Eric Wei MD    Length of stay:  0    Chief complaint:  None    Subjective:    Patient states she is feeling well today and has no complaints.  She denies any palpitations, chest pain, dizziness or lightheadedness.  We will plan for EGD and colonoscopy tomorrow.  ----------------------------------------------------------------------------------------------------------------------  Current Hospital Meds:  atorvastatin, 20 mg, Oral, Daily  ferrous sulfate, 325 mg, Oral, Daily With Breakfast  losartan, 25 mg, Oral, Daily  metoprolol tartrate, 25 mg, Oral, BID  pantoprazole, 40 mg, Oral, Daily  polyethylene glycol, 1 bottle, Oral, BID  senna-docusate sodium, 2 tablet, Oral, BID  sodium chloride, 10 mL, Intravenous, Q12H         ----------------------------------------------------------------------------------------------------------------------  Vital Signs:  Temp:  [97.9 øF (36.6 øC)-98.4 øF (36.9 øC)] 98.1 øF (36.7 øC)  Heart Rate:  [62-89] 68  Resp:  [18] 18  BP: (120-145)/(60-70) 120/60      23  1712 23  0500 23  0500   Weight: 76.7 kg (169 lb) (to finish req admit docs to complete charting. using prior weight from system.) 79.3 kg (174 lb 12.8 oz) 79.1 kg (174 lb 4.8 oz)     Body mass index is 30.88 kg/mý.    Intake/Output Summary (Last 24 hours) at 2023 1125  Last data filed at 2023  Gross per 24 hour   Intake 240 ml   Output --   Net 240 ml       Diet: Liquid Diets; Clear Liquid; Texture: Regular Texture (IDDSI 7); Fluid Consistency: Thin (IDDSI 0)  NPO Diet NPO Type: Strict NPO  ----------------------------------------------------------------------------------------------------------------------  Physical exam:  Constitutional: Well-nourished   female in no apparent distress.     HENT:  Head:  Normocephalic and atraumatic.  Mouth:  Moist mucous membranes.    Eyes:  Conjunctivae and EOM are normal.  Pupils are equal, round, and reactive to light.  No scleral icterus.    Neck:  Neck supple. No thyromegaly.  No JVD present.    Cardiovascular:  Regular rate and rhythm with no murmurs, rubs, clicks or gallops appreciated.  Pulmonary/Chest:  Clear to auscultation bilaterally with no crackles, wheezes or rhonchi appreciated.  Abdominal:  Soft. Nontender. Nondistended  Bowel sounds are normal in all four quadrants. No organomegally appreciated.   Musculoskeletal: No edema, no tenderness, and no deformity.  No red or swollen joints anywhere.    Neurological:  Alert, Cranial nerves II-XII intact with no focal deficits.  No facial droop.  No slurred speech.   Skin:  Warm and dry to palpation with no rashes or lesions appreciated.  Peripheral vascular:  2+ radial and pedal pulses in bilateral upper and lower extremities.  Psychiatric:  Alert and oriented x3, demonstrates appropriate judgment and insight.    Little change in physical exam in comparison to 8/26/2023  -------------------------------------------------------------------------------------------------  ----------------------------------------------------------------------------------------------------------------------  Results from last 7 days   Lab Units 08/25/23  0407 08/25/23  0139   HSTROP T ng/L 12* 12*       Results from last 7 days   Lab Units 08/27/23  0157 08/26/23 0428 08/25/23  0139   WBC 10*3/mm3 6.15 6.10 6.19   HEMOGLOBIN g/dL 9.1* 8.2* 7.0*   HEMATOCRIT % 31.1* 28.4* 25.0*   MCV fL 97.5* 98.3* 98.0*   MCHC g/dL 29.3* 28.9* 28.0*   PLATELETS 10*3/mm3 211 203 254           Results from last 7 days   Lab Units 08/26/23 0428 08/25/23  0139   SODIUM mmol/L 138 143   POTASSIUM mmol/L 4.4 4.2   CHLORIDE mmol/L 106 107   CO2 mmol/L 23.5 25.7   BUN mg/dL 19 16   CREATININE mg/dL 0.98 1.00   CALCIUM  mg/dL 8.6 9.2   GLUCOSE mg/dL 97 120*   ALBUMIN g/dL  --  4.1   BILIRUBIN mg/dL  --  0.5   ALK PHOS U/L  --  108   AST (SGOT) U/L  --  14   ALT (SGPT) U/L  --  10     Estimated Creatinine Clearance: 43.3 mL/min (by C-G formula based on SCr of 0.98 mg/dL).    No results found for: AMMONIA      No results found for: BLOODCX  No results found for: URINECX  No results found for: WOUNDCX  No results found for: STOOLCX    I have personally looked at the labs and they are summarized above.  ----------------------------------------------------------------------------------------------------------------------  Imaging Results (Last 24 Hours)       ** No results found for the last 24 hours. **          ----------------------------------------------------------------------------------------------------------------------  Assessment and Plan:    Symptomatic anemia - resolved with 1 unit PRBC transfusion.  Plan for EGD and colonoscopy tomorrow     2.  Essential hypertension -well-controlled on current antihypertensive medications.  Continue to monitor closely and make medication adjustments as necessary.     3.  Hyperlipidemia -statin     4.  History of aortic bioprosthetic valve -patient has history of aortic bioprosthetic valve (2013) as well as TAVR (2022).  Patient was on Eliquis 2.5 mg p.o. twice daily at home.  We will consult cardiology to discuss the utility and continuation of anticoagulation.       5.  Chronic HFpEF - no evidence of exacerbation at this time    Disposition plan for EGD and colonoscopy tomorrow    Familia Santana DO   08/27/23   11:25 EDT

## 2023-08-27 NOTE — PLAN OF CARE
Goal Outcome Evaluation: Patient has been very pleasant. Compliant with care. Patient remains on room air, saturations remaining above 90%. Patients family at bedside, updated on plan of care. Patient ambulated in hallway, steady gait noted. Patient currently resting in bed. Will continue with plan of care.

## 2023-08-27 NOTE — CONSULTS
[facility]  CONSULT  NOTE      Patient information:  Date of Admit: 8/25/2023  Date of Consult: 08/27/23  Hospitalist/Referring MD:Familia Santana DO;   PCP: Eric Wei MD  MRN:  [id number]  Visit Number:  [id number]    LOS: 0  CODE STATUS:  Code Status and Medical Interventions:   Ordered at: 08/25/23 1723     Code Status (Patient has no pulse and is not breathing):    CPR (Attempt to Resuscitate)     Medical Interventions (Patient has pulse or is breathing):    Full Support       PROBLEM LIST: Principal Problem:    Symptomatic anemia        Assessment    1.  History of AS s/p TAVR  2.  Essential hypertension  3.  Hyperlipidemia  4.  Chronic HFpEF          Recommendations     Pt was on Eliquis 2.5 bid for unclear reasons, last note by her primary cardiologist she was recommended to be DAPT with Asa and plavix for TAVR since there was mildly elevated gradients across the AV, will recommend to hold her eliquis, get records from PCP office as pt thinks it may have been started by her PCP  Plan for EGD tomorrow, if no active bleeding would recommend single anti platelet rx with low dose Asa and f/u with Primary cardiologist        Reason for Cardiology consultation: Recommendations regarding anticoagulation in the presence of a GI bleed    Subjective Data   ADMISSION INFORMATION:  No chief complaint on file.    History of Present Illness    Elizabeth Caceres is a 83 y.o. female with a past medical history significant for aortic stenosis status post bioprosthetic valve replacement, grade 1 diastolic congestive heart failure, history of TAVR, essential hypertension, and hyperlipidemia. Patient presented to [facility] ([facility]) emergency room (ER) on 8/25/2023 with complaints of dizziness with palpitations and lightheadedness.  Patient reported in the emergency room that she had had symptoms for around 2 weeks.  Patient was admitted for symptomatic  anemia.  She is scheduled for EGD on 8/28/2023 due to GI bleeding.  Patient is on low-dose Eliquis at home following her valve replacement. Cardiology has been consulted for further recommendations regarding anticoagulation in the presence of a GI bleed.     Primary Cardiologist has been DR Osman and she was last seen in the office on 7/19/2022.     Patient is in room 302 A and was examined by Dr. Chiang.  Patient sitting up in chair.  No acute distress noted.  Patient states that she is doing well.  She denies any chest pain, shortness of breath, dizziness or palpitations.      Known medications given enroute vis EMS and in the ER:         Cardiac risk factors:hypercholesterolemia and hypertension      Last Echo: Results for orders placed during the hospital encounter of 07/26/23    Adult Transthoracic Echo Complete W/ Cont if Necessary Per Protocol    Interpretation Summary    The study is technically difficult for diagnosis.    Normal left ventricular cavity size and wall thickness noted. All left ventricular wall segments contract normally.    Left ventricular ejection fraction appears to be 61 - 65%.    Left ventricular diastolic function is consistent with (grade I) impaired relaxation.    There is a TAVR valve present. The prosthetic aortic valve peak and mean gradients are elevated.    Ao max PG 42.8 mmHg Ao mean PG 25 mmHg Ao V2 VTI 79.8 cm SHARA(I,D) 0.67 cm2 (possibly underestimation of the aortic valve area)    There is mild calcification of the mitral valve anterior leaflet(s). Mild mitral valve regurgitation is present. No significant mitral valve stenosis is present.    There is no evidence of pericardial effusion.    Comments: May consider a transesophageal echocardiographic study to have a better assessment of the TAVR valve if clinically. warranted.         Last Stress:       Last Cath: Results for orders placed during the hospital encounter of 07/05/22    Cardiac Catheterization/Vascular  Study    Narrative  Please see the operative report.                            Past Medical History:   Diagnosis Date    Arthritis     Congestive heart failure due to valvular disease 2022    COPD (chronic obstructive pulmonary disease)     Hyperlipidemia     Hypertension     MRSA (methicillin resistant Staphylococcus aureus)     History of MRSA with I&D of lower extremity    Urinary frequency      Past Surgical History:   Procedure Laterality Date    AORTIC VALVE REPAIR/REPLACEMENT  10/2013    Dr. Sebastian    AORTIC VALVE REPAIR/REPLACEMENT N/A 2022    Procedure: TRANSAORTIC TRANSCATHETER AORTIC VALVE REPLACEMENT;  Surgeon: Grayson Wade MD;  Location:  [facility];  Service: Cardiothoracic;  Laterality: N/A;  FL-7 m 42 s  Dose-357 mgy  Contrast- 50 ml isovue    AORTIC VALVE REPAIR/REPLACEMENT N/A 2022    Procedure: Transcatheter Aortic Valve Replacement;  Surgeon: Carlton Osman MD;  Location:  [facility];  Service: Cardiovascular;  Laterality: N/A;    APPENDECTOMY N/A 2019    Procedure: APPENDECTOMY LAPAROSCOPIC;  Surgeon: Peter Vargas MD;  Location: [facility];  Service: General    CARDIAC CATHETERIZATION Left 2022    Procedure: Left Heart Cath;  Surgeon: Carlton Osman MD;  Location:  [facility];  Service: Cardiology;  Laterality: Left;  SAME DAY AS AXEL    CARDIAC SURGERY      COLONOSCOPY      HIP ARTHROPLASTY Left     TOTAL LAPAROSCOPIC HYSTERECTOMY SALPINGO OOPHORECTOMY Right 2019    Procedure: LAPAROSCOPIC SALPINGOOPHORECTOMY;  Surgeon: Peter Vargas MD;  Location: [facility];  Service: General     Family History   Problem Relation Age of Onset    Cancer Father     Heart disease Brother     Breast cancer Neg Hx      Social History     Tobacco Use    Smoking status: Former     Types: Cigarettes     Quit date:      Years since quittin.6    Smokeless tobacco: Never   Vaping Use    Vaping Use: Never used   Substance Use Topics    Alcohol use:  No    Drug use: No       Medications listed below are reported home medications pulling from within the system:  Medications Prior to Admission   Medication Sig Dispense Refill Last Dose    apixaban (ELIQUIS) 2.5 MG tablet tablet Take 1 tablet by mouth 2 (Two) Times a Day.   8/25/2023    atorvastatin (LIPITOR) 20 MG tablet Take 1 tablet by mouth Daily.   8/25/2023    furosemide (LASIX) 20 MG tablet Take 1 tablet by mouth 2 (Two) Times a Day.   8/25/2023    losartan (COZAAR) 50 MG tablet Take 0.5 tablets by mouth Daily.   8/25/2023    metoprolol tartrate (LOPRESSOR) 25 MG tablet Take 1 tablet by mouth 2 (Two) Times a Day.   8/25/2023    pantoprazole (PROTONIX) 40 MG EC tablet Take 1 tablet by mouth Daily.   8/25/2023    cefdinir (OMNICEF) 300 MG capsule Take 1 capsule by mouth 2 (Two) Times a Day for 7 days. 14 capsule 0 Unknown     Allergies:  Carvedilol    Review of Systems   Constitutional:  Negative for fatigue.   Respiratory:  Negative for chest tightness, shortness of breath and wheezing.    Cardiovascular:  Negative for chest pain, palpitations and leg swelling.   Neurological:  Negative for dizziness, syncope, light-headedness and headaches.     Objective Data      Vital Signs  Temp:  [97.9 øF (36.6 øC)-98.4 øF (36.9 øC)] 98.4 øF (36.9 øC)  Heart Rate:  [61-89] 61  Resp:  [18] 18  BP: (120-145)/(60-94) 129/94  Vital Signs (last 72 hrs)         08/24 0700  08/25 0659 08/25 0700  08/26 0659 08/26 0700  08/27 0659 08/27 0700  08/27 1602   Most Recent      Temp (øF)   97.3 -  98.6    97.9 -  98.4      98.4     98.4 (36.9) 08/27 1209    Heart Rate   59 -  82    62 -  89      61     61 08/27 1209    Resp     18      18      18     18 08/27 1209    BP   117/60 -  148/58    112/60 -  145/62      129/94     129/94 08/27 1209    SpO2 (%)   95 -  98    97 -  98      97     97 08/27 1209          Body mass index is 30.88 kg/mý.    Intake/Output Summary (Last 24 hours) at 8/27/2023 1602  Last data filed at 8/26/2023  2125  Gross per 24 hour   Intake 240 ml   Output --   Net 240 ml         Physical Exam  Vitals reviewed.   Constitutional:       General: She is awake. She is not in acute distress.     Appearance: Normal appearance. She is well-developed and well-groomed.   HENT:      Head: Normocephalic.      Mouth/Throat:      Mouth: Mucous membranes are moist.   Eyes:      Pupils: Pupils are equal, round, and reactive to light.   Neck:      Vascular: No carotid bruit or JVD.   Cardiovascular:      Rate and Rhythm: Normal rate and regular rhythm.      Pulses: Normal pulses.      Heart sounds: Normal heart sounds. No murmur heard.    No S3 or S4 sounds.   Pulmonary:      Effort: Pulmonary effort is normal. No respiratory distress.      Breath sounds: Normal breath sounds. No wheezing, rhonchi or rales.   Abdominal:      General: Bowel sounds are normal.      Palpations: Abdomen is soft.      Tenderness: There is no abdominal tenderness.   Musculoskeletal:      Cervical back: Normal range of motion.      Right lower leg: No edema.      Left lower leg: No edema.   Skin:     General: Skin is warm and dry.      Capillary Refill: Capillary refill takes less than 2 seconds.   Neurological:      Mental Status: She is alert and oriented to person, place, and time. Mental status is at baseline.   Psychiatric:         Mood and Affect: Mood normal.         Speech: Speech normal.         Behavior: Behavior is cooperative.       Results review   Results Review:    I have reviewed the patient's new clinical results.  Results from last 7 days   Lab Units 08/25/23  0407 08/25/23  0139   HSTROP T ng/L 12* 12*     Results from last 7 days   Lab Units 08/27/23  0157 08/26/23  0428 08/25/23  0139   WBC 10*3/mm3 6.15 6.10 6.19   HEMOGLOBIN g/dL 9.1* 8.2* 7.0*   PLATELETS 10*3/mm3 211 203 254     Results from last 7 days   Lab Units 08/26/23  0428 08/25/23  0139   SODIUM mmol/L 138 143   POTASSIUM mmol/L 4.4 4.2   CHLORIDE mmol/L 106 107   CO2 mmol/L  23.5 25.7   BUN mg/dL 19 16   CREATININE mg/dL 0.98 1.00   CALCIUM mg/dL 8.6 9.2   GLUCOSE mg/dL 97 120*   ALT (SGPT) U/L  --  10   AST (SGOT) U/L  --  14     Lab Results   Component Value Date    INR 0.98 2022    INR 0.92 03/10/2015         Lab Results   Component Value Date    MG 2.2 09/10/2022    MG 2.1 2022    MG 2.2 2022     Lab Results   Component Value Date    TSH 2.850 2022      No results found for: CHOL, TRIG, HDL, LDL  Lab Results   Component Value Date    PROBNP 972.9 2022    PROBNP 5,358.0 (H) 2022    PROBNP 1,428.0 2022     No results found.  No results found for: URICACID  Pain Management Panel          Latest Ref Rng & Units 2022   Pain Management Panel   Amphetamine, Urine Qual Negative Negative    Barbiturates Screen, Urine Negative Negative    Benzodiazepine Screen, Urine Negative Negative    Buprenorphine, Screen, Urine Negative Negative    Cocaine Screen, Urine Negative Negative    Methadone Screen , Urine Negative Negative    Methamphetamine, Ur Negative Negative      Microbiology Results (last 10 days)       ** No results found for the last 240 hours. **           No results found for: BLOODCX        EC2023      ECG/EMG Results (last 24 hours)       Procedure Component Value Units Date/Time    SCANNED - TELEMETRY   [[id number]] Resulted: 23     Updated: 23 1811    SCANNED - TELEMETRY   [[id number]] Resulted: 23     Updated: 23 2300            TELEMETRY:     Sinus bradycardia rate 50s      RADIOLOGY STUDIES:  Imaging Results (Last 72 Hours)       ** No results found for the last 72 hours. **            CURRENT MEDICATIONS:  Current list of medications may not reflect those currently placed in orders that are not signed or are being held.     atorvastatin, 20 mg, Oral, Daily  ferrous sulfate, 325 mg, Oral, Daily With Breakfast  losartan, 25 mg, Oral, Daily  metoprolol tartrate, 25 mg, Oral, BID  pantoprazole, 40 mg,  Oral, Daily  polyethylene glycol, 1 bottle, Oral, BID  senna-docusate sodium, 2 tablet, Oral, BID  sodium chloride, 10 mL, Intravenous, Q12H           acetaminophen    senna-docusate sodium **AND** polyethylene glycol **AND** bisacodyl **AND** bisacodyl    sodium chloride    sodium chloride    I have discussed this patient with Dr. Chiang. Together, we have formed a plan of care for this patient.     Thank you very much for asking us to be involved in this patient's care.  We will follow along with you.    I have discussed the patients findings and my recommendations with patient.      VALDEZ Verde   [facility] Cardiology 16:02 EDT  8/27/2023              Electronically signed by VALDEZ Verde, 08/27/23, 4:18 PM EDT.           Please note that portions of this note were copied and has been reviewed and is accurate as of 8/27/2023 .      Please note that portions of this note were completed with a voice recognition program.

## 2023-08-27 NOTE — PLAN OF CARE
Goal Outcome Evaluation:   Patient resting in bed. No acute signs or symptoms of distress. No complaints at this time. Patient ambulated earlier in the shift. Patient NPO since midnight per order. Will continue to follow plan of care.

## 2023-08-28 ENCOUNTER — ANESTHESIA (OUTPATIENT)
Dept: PERIOP | Facility: HOSPITAL | Age: 83
End: 2023-08-28
Payer: MEDICARE

## 2023-08-28 ENCOUNTER — ANESTHESIA EVENT (OUTPATIENT)
Dept: PERIOP | Facility: HOSPITAL | Age: 83
End: 2023-08-28
Payer: MEDICARE

## 2023-08-28 VITALS
OXYGEN SATURATION: 96 % | DIASTOLIC BLOOD PRESSURE: 58 MMHG | SYSTOLIC BLOOD PRESSURE: 132 MMHG | TEMPERATURE: 97.6 F | RESPIRATION RATE: 18 BRPM | BODY MASS INDEX: 30.88 KG/M2 | HEIGHT: 63 IN | HEART RATE: 63 BPM | WEIGHT: 174.3 LBS

## 2023-08-28 LAB
ANION GAP SERPL CALCULATED.3IONS-SCNC: 12.4 MMOL/L (ref 5–15)
BUN SERPL-MCNC: 15 MG/DL (ref 8–23)
BUN/CREAT SERPL: 16.9 (ref 7–25)
CALCIUM SPEC-SCNC: 9.7 MG/DL (ref 8.6–10.5)
CHLORIDE SERPL-SCNC: 104 MMOL/L (ref 98–107)
CO2 SERPL-SCNC: 19.6 MMOL/L (ref 22–29)
CREAT SERPL-MCNC: 0.89 MG/DL (ref 0.57–1)
DEPRECATED RDW RBC AUTO: 61.1 FL (ref 37–54)
EGFRCR SERPLBLD CKD-EPI 2021: 64.4 ML/MIN/1.73
ERYTHROCYTE [DISTWIDTH] IN BLOOD BY AUTOMATED COUNT: 17.9 % (ref 12.3–15.4)
GLUCOSE SERPL-MCNC: 98 MG/DL (ref 65–99)
HCT VFR BLD AUTO: 33.7 % (ref 34–46.6)
HGB BLD-MCNC: 10 G/DL (ref 12–15.9)
MCH RBC QN AUTO: 28.8 PG (ref 26.6–33)
MCHC RBC AUTO-ENTMCNC: 29.7 G/DL (ref 31.5–35.7)
MCV RBC AUTO: 97.1 FL (ref 79–97)
PLATELET # BLD AUTO: 219 10*3/MM3 (ref 140–450)
PMV BLD AUTO: 9.4 FL (ref 6–12)
POTASSIUM SERPL-SCNC: 3.9 MMOL/L (ref 3.5–5.2)
RBC # BLD AUTO: 3.47 10*6/MM3 (ref 3.77–5.28)
SODIUM SERPL-SCNC: 136 MMOL/L (ref 136–145)
WBC NRBC COR # BLD: 7.6 10*3/MM3 (ref 3.4–10.8)

## 2023-08-28 PROCEDURE — 25010000002 PROPOFOL 10 MG/ML EMULSION: Performed by: NURSE ANESTHETIST, CERTIFIED REGISTERED

## 2023-08-28 PROCEDURE — 25010000002 PROPOFOL 200 MG/20ML EMULSION: Performed by: NURSE ANESTHETIST, CERTIFIED REGISTERED

## 2023-08-28 PROCEDURE — 63710000001 FERROUS SULFATE 325 (65 FE) MG TABLET: Performed by: SURGERY

## 2023-08-28 PROCEDURE — 88305 TISSUE EXAM BY PATHOLOGIST: CPT

## 2023-08-28 PROCEDURE — A9270 NON-COVERED ITEM OR SERVICE: HCPCS | Performed by: SURGERY

## 2023-08-28 PROCEDURE — 88342 IMHCHEM/IMCYTCHM 1ST ANTB: CPT

## 2023-08-28 PROCEDURE — 99232 SBSQ HOSP IP/OBS MODERATE 35: CPT | Performed by: INTERNAL MEDICINE

## 2023-08-28 PROCEDURE — 63710000001 LOSARTAN 25 MG TABLET: Performed by: SURGERY

## 2023-08-28 PROCEDURE — G0378 HOSPITAL OBSERVATION PER HR: HCPCS

## 2023-08-28 PROCEDURE — 63710000001 METOPROLOL TARTRATE 25 MG TABLET: Performed by: SURGERY

## 2023-08-28 PROCEDURE — 80048 BASIC METABOLIC PNL TOTAL CA: CPT | Performed by: INTERNAL MEDICINE

## 2023-08-28 PROCEDURE — 85027 COMPLETE CBC AUTOMATED: CPT | Performed by: INTERNAL MEDICINE

## 2023-08-28 PROCEDURE — 63710000001 PANTOPRAZOLE 40 MG TABLET DELAYED-RELEASE: Performed by: SURGERY

## 2023-08-28 PROCEDURE — 63710000001 ATORVASTATIN 20 MG TABLET: Performed by: SURGERY

## 2023-08-28 RX ORDER — CLOPIDOGREL BISULFATE 75 MG/1
75 TABLET ORAL DAILY
Qty: 30 TABLET | Refills: 0 | Status: SHIPPED | OUTPATIENT
Start: 2023-08-28 | End: 2023-08-31

## 2023-08-28 RX ORDER — KETOROLAC TROMETHAMINE 30 MG/ML
15 INJECTION, SOLUTION INTRAMUSCULAR; INTRAVENOUS EVERY 6 HOURS PRN
Status: DISCONTINUED | OUTPATIENT
Start: 2023-08-28 | End: 2023-08-28

## 2023-08-28 RX ORDER — OXYCODONE HYDROCHLORIDE AND ACETAMINOPHEN 5; 325 MG/1; MG/1
1 TABLET ORAL ONCE AS NEEDED
Status: DISCONTINUED | OUTPATIENT
Start: 2023-08-28 | End: 2023-08-28

## 2023-08-28 RX ORDER — ONDANSETRON 2 MG/ML
4 INJECTION INTRAMUSCULAR; INTRAVENOUS AS NEEDED
Status: DISCONTINUED | OUTPATIENT
Start: 2023-08-28 | End: 2023-08-28

## 2023-08-28 RX ORDER — FERROUS SULFATE 325(65) MG
325 TABLET ORAL
Qty: 30 TABLET | Refills: 0 | Status: SHIPPED | OUTPATIENT
Start: 2023-08-29 | End: 2023-08-31

## 2023-08-28 RX ORDER — MEPERIDINE HYDROCHLORIDE 25 MG/ML
12.5 INJECTION INTRAMUSCULAR; INTRAVENOUS; SUBCUTANEOUS
Status: DISCONTINUED | OUTPATIENT
Start: 2023-08-28 | End: 2023-08-28

## 2023-08-28 RX ORDER — ASPIRIN 81 MG/1
81 TABLET ORAL DAILY
Qty: 30 TABLET | Refills: 0 | Status: SHIPPED | OUTPATIENT
Start: 2023-08-28

## 2023-08-28 RX ORDER — SODIUM CHLORIDE, SODIUM LACTATE, POTASSIUM CHLORIDE, CALCIUM CHLORIDE 600; 310; 30; 20 MG/100ML; MG/100ML; MG/100ML; MG/100ML
100 INJECTION, SOLUTION INTRAVENOUS ONCE AS NEEDED
Status: DISCONTINUED | OUTPATIENT
Start: 2023-08-28 | End: 2023-08-28

## 2023-08-28 RX ORDER — SODIUM CHLORIDE, SODIUM LACTATE, POTASSIUM CHLORIDE, CALCIUM CHLORIDE 600; 310; 30; 20 MG/100ML; MG/100ML; MG/100ML; MG/100ML
INJECTION, SOLUTION INTRAVENOUS CONTINUOUS PRN
Status: DISCONTINUED | OUTPATIENT
Start: 2023-08-28 | End: 2023-08-28 | Stop reason: SURG

## 2023-08-28 RX ORDER — PROPOFOL 10 MG/ML
INJECTION, EMULSION INTRAVENOUS AS NEEDED
Status: DISCONTINUED | OUTPATIENT
Start: 2023-08-28 | End: 2023-08-28 | Stop reason: SURG

## 2023-08-28 RX ORDER — IPRATROPIUM BROMIDE AND ALBUTEROL SULFATE 2.5; .5 MG/3ML; MG/3ML
3 SOLUTION RESPIRATORY (INHALATION) ONCE AS NEEDED
Status: DISCONTINUED | OUTPATIENT
Start: 2023-08-28 | End: 2023-08-28

## 2023-08-28 RX ORDER — FENTANYL CITRATE 50 UG/ML
50 INJECTION, SOLUTION INTRAMUSCULAR; INTRAVENOUS
Status: DISCONTINUED | OUTPATIENT
Start: 2023-08-28 | End: 2023-08-28

## 2023-08-28 RX ADMIN — METOPROLOL TARTRATE 25 MG: 25 TABLET, FILM COATED ORAL at 13:46

## 2023-08-28 RX ADMIN — PANTOPRAZOLE SODIUM 40 MG: 40 TABLET, DELAYED RELEASE ORAL at 13:46

## 2023-08-28 RX ADMIN — ATORVASTATIN CALCIUM 20 MG: 20 TABLET, FILM COATED ORAL at 13:46

## 2023-08-28 RX ADMIN — Medication 10 ML: at 08:32

## 2023-08-28 RX ADMIN — LOSARTAN POTASSIUM 25 MG: 25 TABLET, FILM COATED ORAL at 13:46

## 2023-08-28 RX ADMIN — PROPOFOL 80 MG: 10 INJECTION, EMULSION INTRAVENOUS at 11:17

## 2023-08-28 RX ADMIN — FERROUS SULFATE TAB 325 MG (65 MG ELEMENTAL FE) 325 MG: 325 (65 FE) TAB at 13:46

## 2023-08-28 RX ADMIN — PROPOFOL 120 MCG/KG/MIN: 10 INJECTION, EMULSION INTRAVENOUS at 11:17

## 2023-08-28 RX ADMIN — SODIUM CHLORIDE, POTASSIUM CHLORIDE, SODIUM LACTATE AND CALCIUM CHLORIDE: 600; 310; 30; 20 INJECTION, SOLUTION INTRAVENOUS at 11:14

## 2023-08-28 NOTE — CASE MANAGEMENT/SOCIAL WORK
Discharge Planning Assessment  [facility]     Patient Name: Elizabeth Caceres  MRN: [id number]  Today's Date: 8/28/2023    Admit Date: 8/25/2023    Plan: SS received consult per Case Management for discharge planning.  SS spoke with pt at bedside.  Pt resides [address] [place] and plans to return home at discharge.  Pt currently does not utilize home health services.  Pt currently utilizes walker and cane.  Pt's PCP is Eric Wei.  Pt stated no POA or Living Will.  Pt utilizes [facility].   Discharge Needs Assessment       Row Name 08/28/23 1427       Living Environment    People in Home child(sandy), adult    Name(s) of People in Home Lives at home with daughters residing nearby and assisting as needed.    Current Living Arrangements home    Primary Care Provided by self    Provides Primary Care For no one    Family Caregiver if Needed child(sandy), adult    Quality of Family Relationships helpful;involved;supportive       Resource/Environmental Concerns    Resource/Environmental Concerns none       Transition Planning    Patient/Family Anticipates Transition to home with family    Patient/Family Anticipated Services at Transition none    Transportation Anticipated family or friend will provide       Discharge Needs Assessment    Equipment Currently Used at Home walker, standard;cane, straight                   Discharge Plan       Row Name 08/28/23 1429       Plan    Plan SS received consult per Case Management for discharge planning.  SS spoke with pt at bedside.  Pt resides [place] [address] at [address] [place] and plans to return home at discharge.  Pt currently does not utilize home health services.  Pt currently utilizes walker and cane.  Pt's PCP is Eric Wei.  Pt stated no POA or Living Will.  Pt utilizes [facility].    Final Discharge Disposition Code 01 - home or self-care    Final Note Pt to be discharged home on this date.                   Continued Care and Services - Admitted Since 8/25/2023    Coordination has not been started for this encounter.       Expected Discharge Date and Time       Expected Discharge Date Expected Discharge Time    Aug 28, 2023            Demographic Summary       Row Name 08/28/23 1424       General Information    Admission Type observation    Referral Source nursing    Reason for Consult discharge planning    Preferred Language English                  Aleksandra Franco, JUSTINW

## 2023-08-28 NOTE — PLAN OF CARE
Goal Outcome Evaluation:   Patient resting in bed. No acute signs or symptoms of distress. No complaints at this time. Patient has completed bowel prep for procedure in AM. Patient has remained NPO since midnight. Daughter in room. Consent signed. Patient has ambulated in room this shift. Will continue to follow plan of care.

## 2023-08-28 NOTE — ANESTHESIA POSTPROCEDURE EVALUATION
Patient: Elizabeth Caceres    Procedure Summary       Date: 08/28/23 Room / Location: [facility] OR [address] [place] [address] COR OR    Anesthesia Start: 1114 Anesthesia Stop: 1150    Procedures:       ESOPHAGOGASTRODUODENOSCOPY (Esophagus)      COLONOSCOPY Diagnosis:       Symptomatic anemia      (Symptomatic anemia [D64.9])    Surgeons: Ac Gracia MD Provider: Kirill Meadows MD    Anesthesia Type: general ASA Status: 3            Anesthesia Type: general    Vitals  Vitals Value Taken Time   /69 08/28/23 1221   Temp 97.5 øF (36.4 øC) 08/28/23 1151   Pulse 70 08/28/23 1221   Resp 16 08/28/23 1221   SpO2 94 % 08/28/23 1221           Post Anesthesia Care and Evaluation    Patient location during evaluation: PHASE II  Patient participation: complete - patient participated  Level of consciousness: awake and alert  Pain score: 1  Pain management: adequate    Airway patency: patent  Anesthetic complications: No anesthetic complications  PONV Status: controlled  Cardiovascular status: acceptable  Respiratory status: acceptable  Hydration status: acceptable

## 2023-08-28 NOTE — DISCHARGE SUMMARY
[facility] HOSPITALISTS DISCHARGE SUMMARY    Patient Identification:  Name:  Elizabeth Caceres  Age:  83 y.o.  Sex:  female  :  1940  MRN:  [id number]  Visit Number:  [id number]    Date of Admission: 2023  Date of Discharge:  2023     PCP: Eric Wei MD    DISCHARGE DIAGNOSIS   #Symptomatic anemia  #Essential hypertension   #Hyperlipidemia  #History of aortic bioprosthetic valve ()  #History of TAVR ()  #Chronic HFpEF    CONSULTS   Cardiology   General Surgery    PROCEDURES PERFORMED  EGD and colonoscopy 23    HOSPITAL COURSE  Patient is a 83 y.o. female presented to [facility] complaining of dizziness and palpitations.  Please see the admitting history and physical for further details.      Patient received 1 unit PRBCs during admission for anemia and hemoglobin remained stable >7 thereafter. She did not have any signs or symptoms of acute bleeding. General Surgery was consulted for EGD/colonoscopy at family's request, and this showed a small hiatal hernia, hemorrhoids, and non-bleeding diverticula. Cardiology was consulted for recommendations on her chronic anticoagulation. Cardiology recommendation was to discontinue eliquis and continue plavix with low dose aspirin. On day of discharge patient reported feeling well and denied having any episodes of bleeding. She was discharged home in stable condition.    VITAL SIGNS:  Temp:  [97 øF (36.1 øC)-98.3 øF (36.8 øC)] 97.6 øF (36.4 øC)  Heart Rate:  [57-94] 63  Resp:  [16-20] 18  BP: (105-156)/(52-89) 152/65  SpO2:  [94 %-99 %] 95 %  on  Flow (L/min):  [2-4] 2;   Device (Oxygen Therapy): room air    Body mass index is 30.88 kg/mý.  Wt Readings from Last 3 Encounters:   23 79.1 kg (174 lb 4.8 oz)   23 76.7 kg (169 lb)   22 73.7 kg (162 lb 7.7 oz)       PHYSICAL EXAM:   Constitutional:  Well-developed and well-nourished.  No acute distress.      HENT:  Head:  Normocephalic and atraumatic.     Cardiovascular:  Normal rate, regular rhythm  Pulmonary/Chest:  Normal rate and effort. Breath sounds clear to auscultation bilaterally with good air movement.  Abdominal:  Soft. No distension and no tenderness. Normal bowel sounds present.  Musculoskeletal:  No deformity.   Neurological: Awake, alert, no focal deficit on gross examination. No slurred speech or facial droop.   Skin:  Skin is warm and dry.   Peripheral vascular:  No cyanosis    DISCHARGE DISPOSITION   Stable    DISCHARGE MEDICATIONS:     Discharge Medications        New Medications        Instructions Start Date   ferrous sulfate 325 (65 FE) MG tablet   325 mg, Oral, Daily With Breakfast   Start Date: August 29, 2023     psyllium 58.12 % packet  Commonly known as: METAMUCIL MULTIHEALTH FIBER   1 packet, Oral, Daily             Continue These Medications        Instructions Start Date   atorvastatin 20 MG tablet  Commonly known as: LIPITOR   20 mg, Oral, Daily      furosemide 20 MG tablet  Commonly known as: LASIX   20 mg, Oral, 2 Times Daily      losartan 50 MG tablet  Commonly known as: COZAAR   25 mg, Oral, Daily      metoprolol tartrate 25 MG tablet  Commonly known as: LOPRESSOR   25 mg, Oral, 2 Times Daily      pantoprazole 40 MG EC tablet  Commonly known as: PROTONIX   40 mg, Oral, Daily             Stop These Medications      apixaban 2.5 MG tablet tablet  Commonly known as: ELIQUIS     cefdinir 300 MG capsule  Commonly known as: OMNICEF              Diet Instructions       Diet: Regular/House Diet; Regular Texture (IDDSI 7); Thin (IDDSI 0)      Discharge Diet: Regular/House Diet    Texture: Regular Texture (IDDSI 7)    Fluid Consistency: Thin (IDDSI 0)          Activity Instructions       Activity as Tolerated            Additional Instructions for the Follow-ups that You Need to Schedule       Discharge Follow-up with PCP   As directed       Currently Documented PCP:    Eric Wei MD    PCP Phone Number:    [phone]     Follow  Up Details: within 1-2 weeks        Discharge Follow-up with Specialty: Cardiology (patient's primary cardiologist)   As directed      Specialty: Cardiology (patient's primary cardiologist)   Follow Up Details: 2-3 weeks               Follow-up Information       Eric Wei MD .    Specialty: Internal Medicine  Why: within 1-2 weeks  Contact information:  [address] [place] [address]  [phone]                              TEST  RESULTS PENDING AT DISCHARGE  Pending Labs       Order Current Status    TISSUE EXAM, [facility] ([facility]) In process             CODE STATUS  Code Status and Medical Interventions:   Ordered at: 08/25/23 1723     Code Status (Patient has no pulse and is not breathing):    CPR (Attempt to Resuscitate)     Medical Interventions (Patient has pulse or is breathing):    Full Support         Sharon Gilmore DO  [facility] Hospitalist  08/28/23  14:19 EDT    Please note that this discharge summary required more than 30 minutes to complete.

## 2023-08-28 NOTE — PROGRESS NOTES
LOS: 0 days     Name: Elizabeth Caceres  Age/Sex: 83 y.o. female  :  1940        PCP: Eric Wei MD    Principal Problem:    Symptomatic anemia      Admission Information: Elizabeth Caceres is a 83 y.o. female with a past medical history significant for aortic stenosis status post TAVR, grade 1 diastolic congestive heart failure, hypertension, and hyperlipidemia.  She presented to the ED on 2023 with complaints of dizziness, palpitations and lightheadedness.  She was admitted for symptomatic anemia.  Patient is on low-dose Eliquis and cardiology was consulted for further recommendations regarding anticoagulation in the presence of a GI bleed.    Chief Complaint: Anticoagulation assistance    Interval history: Patient was seen and examined earlier today.  She denies any chest pain, palpitations.  She denies any bright red blood per rectum or black tarry stools.    Of note, patient reports that immediately following her TAVR procedure last year she felt very good and had resolution of symptoms of shortness of breath and dyspnea on exertion.  She has noted recently that she is getting short of breath with minimal exertion.  Her daughter, at bedside, reports this as well.    Subjective         Vital Signs  Vital Signs (last 72 hrs)          0700   0659  0700   0659  0700   0659  0700   1057   Most Recent      Temp (øF) 97.3 -  98.6    97.9 -  98.4    97 -  98.4      98.3     98.3 (36.8)  1051    Heart Rate 59 -  82    62 -  89    57 -  94      72     72  1051    Resp   18      18      18      18     18  1051    /60 -  148/58    112/60 -  145/62    108/54 -  156/73      127/59     127/59  1051    SpO2 (%) 95 -  98    97 -  98    96 -  98      96     96  1051          Temp:  [97 øF (36.1 øC)-98.4 øF (36.9 øC)] 98.3 øF (36.8 øC)  Heart Rate:  [57-94] 72  Resp:  [18] 18  BP: (108-156)/(54-94) 127/59  Body mass index is 30.88  kg/mý.      Intake/Output Summary (Last 24 hours) at 8/28/2023 1057  Last data filed at 8/28/2023 0000  Gross per 24 hour   Intake 0 ml   Output --   Net 0 ml       Vitals reviewed.   Constitutional:       Appearance: Well-developed.   Neck:      Vascular: No carotid bruit or JVD.   Pulmonary:      Effort: Pulmonary effort is normal. No respiratory distress.      Breath sounds: Normal breath sounds. No wheezing. No rales.   Cardiovascular:      Normal rate. Regular rhythm.      Comments: No lower extremity edema  Skin:     General: Skin is warm and dry.   Neurological:      Mental Status: Alert and oriented to person, place, and time.       Telemetry: Sinus 60s to 70s, down to sinus 40s during the night.       Results Review:     Results from last 7 days   Lab Units 08/28/23  0141 08/27/23  0157 08/26/23  0428 08/25/23 0139   WBC 10*3/mm3 7.60 6.15 6.10 6.19   HEMOGLOBIN g/dL 10.0* 9.1* 8.2* 7.0*   PLATELETS 10*3/mm3 219 211 203 254     Results from last 7 days   Lab Units 08/28/23  0141 08/26/23  0428 08/25/23  0139   SODIUM mmol/L 136 138 143   POTASSIUM mmol/L 3.9 4.4 4.2   CHLORIDE mmol/L 104 106 107   CO2 mmol/L 19.6* 23.5 25.7   BUN mg/dL 15 19 16   CREATININE mg/dL 0.89 0.98 1.00   CALCIUM mg/dL 9.7 8.6 9.2   GLUCOSE mg/dL 98 97 120*     Results from last 7 days   Lab Units 08/25/23  0407 08/25/23  0139   HSTROP T ng/L 12* 12*             I reviewed the patient's new clinical results.  I reviewed the patient's new imaging results and agree with the interpretation.  I personally viewed and interpreted the patient's EKG/Telemetry data      Medication Review:   [MAR Hold] atorvastatin, 20 mg, Oral, Daily  [MAR Hold] ferrous sulfate, 325 mg, Oral, Daily With Breakfast  losartan, 25 mg, Oral, Daily  metoprolol tartrate, 25 mg, Oral, BID  [MAR Hold] pantoprazole, 40 mg, Oral, Daily  [MAR Hold] senna-docusate sodium, 2 tablet, Oral, BID  [MAR Hold] sodium chloride, 10 mL, Intravenous, Q12H            Assessment:  History of aortic stenosis status post TAVR  Essential hypertension  Hyperlipidemia  Chronic HFpEF      Recommendations:  EGD and colonoscopy showed no active bleeding.    Will recommend patient be discharged home on Plavix and low-dose aspirin.  Follow-up with primary cardiologist, Dr. Osman.    I discussed the patients findings and my recommendations with patient and family    Electronically signed by VALDEZ Mckeon, 08/28/23, 3:09 PM EDT.  .Electronically signed by Suhda Denny MD, 08/28/23, 11:12 PM EDT.

## 2023-08-28 NOTE — INTERVAL H&P NOTE
H&P reviewed.  The patient was examined and there are no changes to the H&P.  No reported melena or hematochezia. Last colonoscopy 2012. On eliquis.  To endoscopy for egd and colonoscopy

## 2023-08-28 NOTE — ANESTHESIA PREPROCEDURE EVALUATION
Anesthesia Evaluation     Patient summary reviewed and Nursing notes reviewed   no history of anesthetic complications:   NPO Solid Status: > 8 hours  NPO Liquid Status: > 8 hours           Airway   Mallampati: II  TM distance: >3 FB  Neck ROM: full  No difficulty expected  Dental    (+) edentulous    Pulmonary - normal exam   (+) a smoker Former, COPD,  Cardiovascular - normal exam  Exercise tolerance: good (4-7 METS)    ECG reviewed  Rhythm: regular  Rate: normal    (+) hypertension, valvular problems/murmurs (TAVR) AS, CHF , hyperlipidemia      Neuro/Psych- negative ROS  GI/Hepatic/Renal/Endo    (+) obesity    Musculoskeletal     Abdominal   (+) obese    Abdomen: soft.   Substance History - negative use     OB/GYN negative ob/gyn ROS         Other   arthritis,     ROS/Med Hx Other: TAVR Echo 7/26/23 with EF of 61-65%    Anemia chronic                  Anesthesia Plan    ASA 3     general     intravenous induction     Anesthetic plan, risks, benefits, and alternatives have been provided, discussed and informed consent has been obtained with: patient.  Pre-procedure education provided  Use of blood products discussed with  Consented to blood products.

## 2023-08-29 ENCOUNTER — OFFICE VISIT (OUTPATIENT)
Dept: CARDIOLOGY | Facility: CLINIC | Age: 83
End: 2023-08-29
Payer: MEDICARE

## 2023-08-29 ENCOUNTER — READMISSION MANAGEMENT (OUTPATIENT)
Dept: CALL CENTER | Facility: HOSPITAL | Age: 83
End: 2023-08-29
Payer: MEDICARE

## 2023-08-29 VITALS
OXYGEN SATURATION: 95 % | SYSTOLIC BLOOD PRESSURE: 112 MMHG | DIASTOLIC BLOOD PRESSURE: 64 MMHG | HEART RATE: 88 BPM | WEIGHT: 157.6 LBS | HEIGHT: 67 IN | BODY MASS INDEX: 24.74 KG/M2

## 2023-08-29 DIAGNOSIS — E78.2 MIXED HYPERLIPIDEMIA: ICD-10-CM

## 2023-08-29 DIAGNOSIS — I38 VALVULAR HEART DISEASE: Primary | ICD-10-CM

## 2023-08-29 DIAGNOSIS — I10 ESSENTIAL HYPERTENSION: ICD-10-CM

## 2023-08-29 RX ORDER — MULTIVIT WITH MINERALS/LUTEIN
250 TABLET ORAL DAILY
COMMUNITY

## 2023-08-29 RX ORDER — LANOLIN ALCOHOL/MO/W.PET/CERES
1000 CREAM (GRAM) TOPICAL DAILY
COMMUNITY

## 2023-08-29 RX ORDER — FUROSEMIDE 20 MG/1
20 TABLET ORAL DAILY
COMMUNITY

## 2023-08-29 RX ORDER — DOXYCYCLINE HYCLATE 50 MG/1
1 CAPSULE, GELATIN COATED ORAL DAILY
COMMUNITY
Start: 2023-08-18

## 2023-08-29 NOTE — OUTREACH NOTE
Prep Survey      Flowsheet Row Responses   [facility] facility patient discharged from? [facility]   Is LACE score < 7 ? No   Eligibility Readm Mgmt   Discharge diagnosis Symptomatic anemia   Does the patient have one of the following disease processes/diagnoses(primary or secondary)? Other   Does the patient have Home health ordered? No   Is there a DME ordered? No   Medication alerts for this patient See AVS   Prep survey completed? Yes            Veronica HIDALGO - Registered Nurse

## 2023-08-29 NOTE — ED PROVIDER NOTES
"Subjective   History of Present Illness  Elizabeth Caceres is an 83-year-old  female with PMH of CHF, COPD, HLD, HTN, MRSA, aortic valve replacement, hip arthropathy.  She is here with dizziness, this started 2 days ago, gradually getting worse, with generalized weakness.  Denies any focal neurological deficits.  No blurred vision or slurred speech.  No headache or neck pain.  Denies shortness of breath or chest pain.  Denies dysuria.  No recent travel, no exposure to sick contacts.    History provided by:  Patient   used: No      Review of Systems   Constitutional:  Negative for appetite change, chills, diaphoresis, fatigue and fever.   HENT:  Negative for hearing loss, nosebleeds, rhinorrhea, sneezing and sore throat.    Eyes:  Negative for photophobia, pain and redness.   Respiratory:  Negative for cough, shortness of breath and stridor.    Cardiovascular:  Negative for chest pain, palpitations and leg swelling.   Gastrointestinal:  Negative for abdominal distention, abdominal pain, diarrhea, nausea and rectal pain.   Endocrine: Negative for cold intolerance and heat intolerance.   Genitourinary:  Negative for difficulty urinating, dyspareunia and dysuria.   Musculoskeletal:  Negative for arthralgias, back pain, gait problem and joint swelling.   Skin:  Negative for color change and pallor.   Neurological:  Positive for dizziness. Negative for facial asymmetry.   Psychiatric/Behavioral:  Negative for agitation and behavioral problems.    All other systems reviewed and are negative.    Past Medical History:   Diagnosis Date    Arthritis     Congestive heart failure due to valvular disease 7/5/2022    COPD (chronic obstructive pulmonary disease)     Hyperlipidemia     Hypertension     MRSA (methicillin resistant Staphylococcus aureus) 2007    History of MRSA with I&D of lower extremity    Urinary frequency        Allergies   Allergen Reactions    Carvedilol Nausea Only     \"INTOLERANCE\" "       Past Surgical History:   Procedure Laterality Date    AORTIC VALVE REPAIR/REPLACEMENT  10/2013    Dr. Sebastian    AORTIC VALVE REPAIR/REPLACEMENT N/A 2022    Procedure: TRANSAORTIC TRANSCATHETER AORTIC VALVE REPLACEMENT;  Surgeon: Grayson Wade MD;  Location:  [facility];  Service: Cardiothoracic;  Laterality: N/A;  FL-7 m 42 s  Dose-357 mgy  Contrast- 50 ml isovue    AORTIC VALVE REPAIR/REPLACEMENT N/A 2022    Procedure: Transcatheter Aortic Valve Replacement;  Surgeon: Carlton Osman MD;  Location: [facility];  Service: Cardiovascular;  Laterality: N/A;    APPENDECTOMY N/A 2019    Procedure: APPENDECTOMY LAPAROSCOPIC;  Surgeon: ePter Vargas MD;  Location: [facility] OR;  Service: General    CARDIAC CATHETERIZATION Left 2022    Procedure: Left Heart Cath;  Surgeon: Carlton Osman MD;  Location: [facility] CATH INVASIVE LOCATION;  Service: Cardiology;  Laterality: Left;  SAME DAY AS AXEL    CARDIAC SURGERY      COLONOSCOPY      COLONOSCOPY N/A 2023    Procedure: COLONOSCOPY;  Surgeon: Ac Gracia MD;  Location: [facility] OR;  Service: Gastroenterology;  Laterality: N/A;    ENDOSCOPY N/A 2023    Procedure: ESOPHAGOGASTRODUODENOSCOPY;  Surgeon: Ac Gracia MD;  Location: [facility] OR;  Service: Gastroenterology;  Laterality: N/A;    HIP ARTHROPLASTY Left     TOTAL LAPAROSCOPIC HYSTERECTOMY SALPINGO OOPHORECTOMY Right 2019    Procedure: LAPAROSCOPIC SALPINGOOPHORECTOMY;  Surgeon: Peter Vargas MD;  Location: [facility] OR;  Service: General       Family History   Problem Relation Age of Onset    Cancer Father     Heart disease Brother     Breast cancer Neg Hx        Social History     Socioeconomic History    Marital status:    Tobacco Use    Smoking status: Former     Types: Cigarettes     Quit date:      Years since quittin.6     Passive exposure: Past    Smokeless tobacco: Never   Vaping Use    Vaping Use: Never used   Substance and Sexual Activity     Alcohol use: No    Drug use: No    Sexual activity: Defer           Objective   Physical Exam  Vitals and nursing note reviewed.   Constitutional:       General: She is not in acute distress.     Appearance: Normal appearance. She is well-developed and normal weight. She is not ill-appearing, toxic-appearing or diaphoretic.   HENT:      Head: Normocephalic and atraumatic.      Right Ear: Tympanic membrane, ear canal and external ear normal.      Left Ear: Tympanic membrane, ear canal and external ear normal.      Nose: Nose normal.      Mouth/Throat:      Mouth: Mucous membranes are moist.   Eyes:      Extraocular Movements: Extraocular movements intact.      Conjunctiva/sclera: Conjunctivae normal.      Pupils: Pupils are equal, round, and reactive to light.      Comments: Normal fundi   Cardiovascular:      Rate and Rhythm: Normal rate and regular rhythm.      Pulses: Normal pulses.      Heart sounds: Normal heart sounds.   Pulmonary:      Effort: Pulmonary effort is normal.      Breath sounds: Normal breath sounds.   Abdominal:      General: Bowel sounds are normal.      Palpations: Abdomen is soft.   Musculoskeletal:         General: Normal range of motion.      Cervical back: Normal range of motion and neck supple.   Skin:     General: Skin is warm and dry.      Capillary Refill: Capillary refill takes 2 to 3 seconds.   Neurological:      General: No focal deficit present.      Mental Status: She is alert and oriented to person, place, and time. Mental status is at baseline.      GCS: GCS eye subscore is 4. GCS verbal subscore is 5. GCS motor subscore is 6.      Cranial Nerves: No cranial nerve deficit.      Sensory: No sensory deficit.      Motor: No weakness.   Psychiatric:         Mood and Affect: Mood normal.         Speech: Speech normal.         Behavior: Behavior normal.         Thought Content: Thought content normal.         Judgment: Judgment normal.       Procedures           ED Course  ED Course as  of 08/29/23 1509   Fri Aug 25, 2023   0025 CT scan head without contrast:  No intracranial acute process. [DS]   0145 Patient remained in no acute distress with normal her ER visit.  Her cardiac enzymes are nonactionable, EKG shows no acute ischemic changes.  Head CT shows no brain bleed.  Patient is not orthostatic.      Patient is not septic, has no fever, WBC count is normal.  She did have a significant urinary tract infection with pyuria, bacteriuria and hematuria.  Patient received a dose of 2 g Rocephin IV in the emergency room and should be discharged home with oral antibiotics.  Patient started plenty of fluids and follow-up PCP if not better in 2 days.  I believe the UTI is the cause of her dizziness.  Once the UTI is resolved she will start feeling less dizzy and she should recover in just a couple of days.  Urged family to be present by her side and assist her with her needs in order to avoid falls.    Exam was completed in the setting of the COVID-19 pandemic.  Counseling: Discussed today's findings, in addition to providing specific details for the plan of care.  Questions are answered and there is agreement with the plan.  Counseling was provided regarding the diagnosis and prognosis. Discussed supportive care, the specific conditions for return, as well as the importance of follow-up. Advised to recheck here immediately with new or worsening symptoms.     [DS]   Sun Aug 27, 2023   0431 Vent. Rate :  78 BPM     Atrial Rate :  78 BPM     P-R Int : 182 ms          QRS Dur :  74 ms      QT Int : 394 ms       P-R-T Axes :  47  36  86 degrees     QTc Int : 449 ms        [DS]   0431 Blood Pressure :   */*   mmHG  Vent. Rate :  78 BPM     Atrial Rate :  78 BPM     P-R Int : 182 ms          QRS Dur :  74 ms      QT Int : 394 ms       P-R-T Axes :  47  36  86 degrees     QTc Int : 449 ms     Normal sinus rhythm  Normal ECG  EKG interpretation: -No acute ischemic changes, normal sinus rhythm    Electronically  signed by Guanako Garces MD, 08/27/23, 4:32 AM EDT.   [DS]   Tue Aug 29, 2023   1507 HS Troponin T(!): 12 [DS]   1507 RBC(!): 2.55 [DS]   1507 Hemoglobin(!): 7.0 [DS]   1507 Hematocrit(!): 25.0 [DS]   1507 MCV(!): 98.0 [DS]   1507 MCHC(!): 28.0 [DS]   1507 RDW(!): 17.4 [DS]   1507 RDW-SD(!): 57.5 [DS]   1507 Immature Granulocyte Rel %(!): 0.8 [DS]   1507 nRBC(!): 0.3 [DS]   1507 Glucose(!): 120 [DS]   1507 eGFR(!): 56.0 [DS]   1507 HS Troponin T(!): 12 [DS]   1507 Appearance, UA(!): Cloudy [DS]   1507 Leukocytes, UA(!): Moderate (2+) [DS]   1507 Nitrite, UA(!): Positive [DS]   1507 WBC, UA(!): 31-50 [DS]   1507 Bacteria, UA(!): 4+ [DS]      ED Course User Index  [DS] Guanako Garces MD                                           Medical Decision Making  Elizabeth Caceres is an 83-year-old  female with PMH of CHF, COPD, HLD, HTN, MRSA, aortic valve replacement, hip arthropathy.  She is here with dizziness, this started 2 days ago, gradually getting worse, with generalized weakness.  Denies any focal neurological deficits.  No blurred vision or slurred speech.  No headache or neck pain.  Denies shortness of breath or chest pain.  Denies dysuria.  No recent travel, no exposure to sick contacts.    Problems Addressed:  Acute cystitis without hematuria: complicated acute illness or injury  Chronic anemia: complicated acute illness or injury    Amount and/or Complexity of Data Reviewed  Labs: ordered.  Radiology: ordered.  ECG/medicine tests: ordered.        Final diagnoses:   Acute cystitis without hematuria   Chronic anemia       ED Disposition  ED Disposition       ED Disposition   Discharge    Condition   Stable    Comment   --               Eric Wei MD  [address] [facility] [address] [phone]    Schedule an appointment as soon as possible for a visit   on Monday for repeat blood-work    [facility] EMERGENCY DEPARTMENT  [address]  [id number]  [phone]    if unable to see PCP         Medication List      No changes were made to your prescriptions during this visit.            Guanako Garces MD  08/29/23 [id number]

## 2023-08-29 NOTE — PROGRESS NOTES
[facility] Cardiology    Encounter Date: 2023    Patient ID: Elizabeth Caceres is a 83 y.o. female.  : 1940     PCP: Eric Wei MD       Chief Complaint: Valvular heart disease      PROBLEM LIST:  Valvular heart disease:  Echocardiogram, 10/2013: Critical AS with SHARA 0.7 cm2 and mean gradient of 45 mmHg.   Abnormal Cardiolite stress test, 2013.    [facility], 10/23/2013, Dr. Osman: Critical AS with gradient of 101 mm across the aortic valve.  EF 70%. No significant coronary disease.    AVR with bioprosthetic valve, 10/2013, Dr. Sebastian.   Normal MPS with no evidence of ischemia. EF 80%.  Echocardiogram, 10/03/2017: EF 66-70%. LV diastolic dysfunction (grade I) consistent with impaired relaxation. Bioprosthetic AV. Calcification of the aortic valve mainly affecting the non and right coronary cusp(s). Mild to moderate AS. Peak gradient of 35 and a mean gradient of 20 mm Hg. SHARA 1.0cm2, probably an underestimation. Myxomatous changes of the mitral valve apparatus. Mild MR. No evidence of pericardial effusion.  Echocardiogram, 10/11/2018: EF > 70% with mild concentric LVH. LV diastolic dysfunction (grade I). Mild AS, bioprosthetic AV with peak gradient 30 mmHg, mean 17. No AI. Mild TR, RVSP 37 mmHg. No evidence of pericardial effusion.  Echocardiogram, 2021: EF 61-65%. Moderate LVH. Grade I diastolic dysfunction. Bioprosthetic AV present. Moderate to severe AS, Ao max PG 68 mmHg, Ao mean PG 36 mmHg, SHARA (I,D) 1.16 cm^2. RVSP from TR 35-45 mmHg. Mild PHTN.   Echocardiogram, 2022; EF 61-65%. EF 61-65%. Severe AS, max .4 mmHg, mean PG 65.3 mmHg, Ao V2 .3 cm, SHARA(I,D) 0.51 cm2. Moderate concentric LVH. Grade I diastolic dysfunction. Mild MR and TR. Moderate PHTN (50 mmHg).   Echocardiogram, 2022: Moderate biatrial enlargement, mild to moderate concentric hypertrophy, EF 60%.  There is a bioprosthetic aortic valve present with restricted leaflet  mobility and severe aortic stenosis with a mean gradient of 56.7 mmHg and STJ 2.6 cm  [facility] 5/19/2022: Angiographically near normal coronaries  Carotid duplex 5/19/2022: No evidence of hemodynamically significant stenosis of bilateral carotid arteries, intimal thickening and mild scattered plaque noted  Echocardiogram, 07/27/2023: EF 61-65%. Left ventricular diastolic function is consistent with (grade I) impaired relaxation. There is a TAVR valve present. The prosthetic aortic valve peak and mean gradients are elevated. There is mild calcification of the mitral valve anterior leaflet(s). Mild MR is present.  Aortic stenosis  Echocardiogram, 07/06/2022; EF 55%. Mild concentric LVH. Severe aortic stenosis of the bioprosthetic valve is noted with mean gradient 61.7 mmHg and SHARA 0.43 cmý. Mild AI. Moderate MR. Mild to moderate TR.  AXEL 07/07/2022; LVEF 45% without obvious WMA.  RV function mildly reduced, mild dilation.  Prosthetic AV with severe stenosis, SHARA 0.4 by CE, mean gradient 54mmhg, vmax 4.8m/s; DI 0.16.  Moderate AI (VC 0.4). Moderate MR with central jet. Mild TR, trace PI. No PFO, ÁNGEL clot, nor obvious baseline effusion.  Minimal nonmobile atheroma in aorta.   TAVR, 07/07/2022; Successful transcatheter aortic valve replacement with 20 mm [place] SANGITA tissue valve. No acute procedure-related complications.   Hypertension.   Dyslipidemia.    History of presyncope.   History of MRSA with I&D of lower extremity, 2007.   Vitamin D and Vitamin B12 deficiency.     History of Present Illness  Patient presents today for a 6 month follow-up with a history of valvular heart disease, and cardiac risk factors. Since last visit, patient was admitted to [facility] with GI bleed. Patient reports that she was dizzy and short of breath, which prompted her ED visit.  When she got to the ED, she was found with hemoglobin of 7.0. There seems to be a complex story as to how she ultimately ended up with a transfusion  "but she did receive 1 unit of pRBCs and had a colonoscopy and endoscopy.  She did have diverticulitis with no active bleeding. She did also have an echocardiogram to reassess her aortic valve which did show increased gradients. Since her transfusion, her shortness of breath has improved. She is able to ambulate as she did prior to hospitalization. She denies chest pain, palpitations, orthopnea, edema, presyncope or syncope.       Allergies   Allergen Reactions    Carvedilol Nausea Only     \"INTOLERANCE\"         Current Outpatient Medications:     apixaban (ELIQUIS) 2.5 MG tablet tablet, Take 1 tablet by mouth 2 (Two) Times a Day., Disp: , Rfl:     aspirin 81 MG EC tablet, Take 1 tablet by mouth Daily., Disp: 30 tablet, Rfl: 0    atorvastatin (LIPITOR) 20 MG tablet, Take 1 tablet by mouth Daily., Disp: , Rfl:     ferrous gluconate (FERGON) 324 MG tablet, Take 1 tablet by mouth Daily., Disp: , Rfl:     furosemide (LASIX) 20 MG tablet, Take 1 tablet by mouth Daily., Disp: , Rfl:     losartan (COZAAR) 50 MG tablet, Take 0.5 tablets by mouth Daily., Disp: , Rfl:     metoprolol tartrate (LOPRESSOR) 25 MG tablet, Take 1 tablet by mouth 2 (Two) Times a Day., Disp: , Rfl:     psyllium (METAMUCIL MULTIHEALTH FIBER) 58.12 % packet, Take 1 packet by mouth Daily., Disp: 30 each, Rfl: 0    vitamin B-12 (CYANOCOBALAMIN) 1000 MCG tablet, Take 1 tablet by mouth Daily., Disp: , Rfl:     vitamin C (ASCORBIC ACID) 250 MG tablet, Take 1 tablet by mouth Daily., Disp: , Rfl:     vitamin D3 125 MCG (5000 UT) capsule capsule, Take 1 capsule by mouth Daily., Disp: , Rfl:     The following portions of the patient's history were reviewed and updated as appropriate: allergies, current medications, past family history, past medical history, past social history, past surgical history and problem list.    ROS  Review of Systems   14 point ROS negative except for that listed in the HPI.         Objective:     /64 (BP Location: Left arm, " "Patient Position: Sitting)   Pulse 88   Ht 170.2 cm (67\")   Wt 71.5 kg (157 lb 9.6 oz)   LMP  (LMP Unknown)   SpO2 95%   BMI 24.68 kg/mý      Physical Exam  Constitutional: Patient appears well-developed and well-nourished.   HENT: HEENT exam unremarkable.   Neck: Neck supple. No JVD present. No carotid bruits.   Cardiovascular: Normal rate, regular rhythm and normal heart sounds. Faint systolic murmur  2+ symmetric pulses.   Pulmonary/Chest: Breath sounds normal. Does not exhibit tenderness.   Abdominal: Abdomen benign.   Musculoskeletal: Does not exhibit edema.   Neurological: Neurological exam unremarkable.   Vitals reviewed.    Data Review:   Lab Results   Component Value Date    GLUCOSE 98 08/28/2023    BUN 15 08/28/2023    CREATININE 0.89 08/28/2023    EGFR 64.4 08/28/2023    BCR 16.9 08/28/2023     08/28/2023    K 3.9 08/28/2023    CO2 19.6 (L) 08/28/2023    CALCIUM 9.7 08/28/2023    ALBUMIN 4.1 08/25/2023    AST 14 08/25/2023    ALT 10 08/25/2023     Lab Results   Component Value Date    CHOL 112 09/10/2022    CHLPL 127 03/11/2015    TRIG 78 09/10/2022    HDL 47 09/10/2022    LDL 49 09/10/2022      Lab Results   Component Value Date    WBC 7.60 08/28/2023    RBC 3.47 (L) 08/28/2023    HGB 10.0 (L) 08/28/2023    HCT 33.7 (L) 08/28/2023    MCV 97.1 (H) 08/28/2023     08/28/2023     Lab Results   Component Value Date    TSH 2.850 09/09/2022     Lab Results   Component Value Date    HGBA1C 4.90 09/10/2022        Procedures     Hospital records reviewed.   Echocardiogram reviewed.   Lab results reviewed.   EGD/Colonoscopy reviewed.   Medication management.         Assessment:      Diagnosis Plan   1. Valvular heart disease  Increased aortic gradients on echocardiogram at [facility].  Likely related to significant anemia.  Shortness of breath ALSO likely related to anemia as this has improved after transfusion.  Discussed that if she has worsening shortness of breath or any other " heart failure symptoms we will consider AXEL for further evaluation.      2. Essential hypertension  Well-controlled.  Continue current antihypertensive regimen.      3. Mixed hyperlipidemia  Well-controlled.  Continue statin therapy.        Plan:   No indication for AXEL at this time as shortness of breath was likely related to significant anemia.  Shortness of breath has improved.  If she has worsening shortness of breath or other heart failure symptoms, we will consider AXEL for further evaluation of valvular heart disease.  Okay to discontinue Eliquis as there is no clear etiology as to why she is on this medication.  No postoperative atrial fibrillation or history of A-fib.  Continue monotherapy with aspirin.  Keep follow-up with GI.  Continue current medications.   FU in 6 MO, sooner as needed.  Thank you for allowing us to participate in the care of your patient.         Silvana Boyle PA-C    Please note that portions of this note may have been completed with a voice recognition program. Efforts were made to edit the dictations, but occasionally words are mistranscribed.

## 2023-08-30 ENCOUNTER — READMISSION MANAGEMENT (OUTPATIENT)
Dept: CALL CENTER | Facility: HOSPITAL | Age: 83
End: 2023-08-30
Payer: MEDICARE

## 2023-08-30 NOTE — OUTREACH NOTE
Medical Week 1 Survey      Flowsheet Row Responses   [facility] patient discharged from? [facility]   Does the patient have one of the following disease processes/diagnoses(primary or secondary)? Other   Week 1 attempt successful? Yes   Call start time 1733   Call end time 1735   Discharge diagnosis Symptomatic anemia   Meds reviewed with patient/caregiver? Yes   Is the patient having any side effects they believe may be caused by any medication additions or changes? No   Does the patient have all medications ordered at discharge? Yes   Is the patient taking all medications as directed (includes completed medication regime)? Yes   Does the patient have a primary care provider?  Yes   Does the patient have an appointment with their PCP within 7 days of discharge? Yes   Has the patient kept scheduled appointments due by today? Yes   Psychosocial issues? No   Did the patient receive a copy of their discharge instructions? Yes   Nursing interventions Reviewed instructions with patient   What is the patient's perception of their health status since discharge? Improving   Is the patient/caregiver able to teach back signs and symptoms related to disease process for when to call PCP? Yes   Is the patient/caregiver able to teach back signs and symptoms related to disease process for when to call 911? Yes   Is the patient/caregiver able to teach back the hierarchy of who to call/visit for symptoms/problems? PCP, Specialist, Home health nurse, Urgent Care, ED, 911 Yes   If the patient is a current smoker, are they able to teach back resources for cessation? Not a smoker   Week 1 call completed? Yes   Wrap up additional comments pt stated she feels great   Call end time 1735            JASON SETHI - Registered Nurse

## 2023-08-31 LAB — REF LAB TEST METHOD: NORMAL

## 2023-09-07 ENCOUNTER — READMISSION MANAGEMENT (OUTPATIENT)
Dept: CALL CENTER | Facility: HOSPITAL | Age: 83
End: 2023-09-07
Payer: MEDICARE

## 2023-09-07 NOTE — OUTREACH NOTE
Medical Week 2 Survey      Flowsheet Row Responses   [facility] patient discharged from? [facility]   Does the patient have one of the following disease processes/diagnoses(primary or secondary)? Other   Week 2 attempt successful? No  [unable to complete call, Destiny, daughter did not have info on pt, requests calling pt's daughterKaren for best info, was UTR Karen]   Unsuccessful attempts Attempt 1            María RUSHING - Registered Nurse

## 2023-09-12 ENCOUNTER — READMISSION MANAGEMENT (OUTPATIENT)
Dept: CALL CENTER | Facility: HOSPITAL | Age: 83
End: 2023-09-12
Payer: MEDICARE

## 2023-09-12 NOTE — OUTREACH NOTE
Medical Week 2 Survey      Flowsheet Row Responses   [facility]? [facility]   Does the patient have one of the following disease processes/diagnoses(primary or secondary)? Other   Week 2 attempt successful? No   Unsuccessful attempts Attempt 2            Donna REINOSO - Registered Nurse

## 2023-09-21 ENCOUNTER — READMISSION MANAGEMENT (OUTPATIENT)
Dept: CALL CENTER | Facility: HOSPITAL | Age: 83
End: 2023-09-21
Payer: MEDICARE

## 2023-09-21 NOTE — OUTREACH NOTE
Medical Week 3 Survey      Flowsheet Row Responses   [facility] patient discharged from? [facility]   Does the patient have one of the following disease processes/diagnoses(primary or secondary)? Other   Week 3 attempt successful? Yes   Call start time 1408   Call end time 1409   Discharge diagnosis Symptomatic anemia   Person spoke with today (if not patient) and relationship daughter   Meds reviewed with patient/caregiver? Yes   Is the patient having any side effects they believe may be caused by any medication additions or changes? No   Does the patient have all medications ordered at discharge? Yes   Is the patient taking all medications as directed (includes completed medication regime)? Yes   Does the patient have a primary care provider?  Yes   Does the patient have an appointment with their PCP within 7 days of discharge? Yes   Has the patient kept scheduled appointments due by today? Yes   Psychosocial issues? No   Did the patient receive a copy of their discharge instructions? Yes   Nursing interventions Reviewed instructions with patient   What is the patient's perception of their health status since discharge? Improving   Is the patient/caregiver able to teach back signs and symptoms related to disease process for when to call PCP? Yes   Is the patient/caregiver able to teach back signs and symptoms related to disease process for when to call 911? Yes   Is the patient/caregiver able to teach back the hierarchy of who to call/visit for symptoms/problems? PCP, Specialist, Home health nurse, Urgent Care, ED, 911 Yes   If the patient is a current smoker, are they able to teach back resources for cessation? Not a smoker   Week 3 Call Completed? Yes   Graduated Yes   Call end time 1409            Brian KNIGHT - Registered Nurse

## 2024-01-26 RX ORDER — LOSARTAN POTASSIUM 50 MG/1
50 TABLET ORAL DAILY
Qty: 90 TABLET | Refills: 3 | Status: SHIPPED | OUTPATIENT
Start: 2024-01-26

## 2024-07-02 ENCOUNTER — OFFICE VISIT (OUTPATIENT)
Age: 84
End: 2024-07-02
Payer: MEDICARE

## 2024-07-02 VITALS
SYSTOLIC BLOOD PRESSURE: 162 MMHG | BODY MASS INDEX: 27.9 KG/M2 | HEIGHT: 62 IN | WEIGHT: 151.6 LBS | OXYGEN SATURATION: 97 % | DIASTOLIC BLOOD PRESSURE: 68 MMHG | HEART RATE: 85 BPM

## 2024-07-02 DIAGNOSIS — E78.5 DYSLIPIDEMIA: ICD-10-CM

## 2024-07-02 DIAGNOSIS — I38 VALVULAR HEART DISEASE: Primary | Chronic | ICD-10-CM

## 2024-07-02 DIAGNOSIS — I10 ESSENTIAL HYPERTENSION: ICD-10-CM

## 2024-07-02 RX ORDER — LOSARTAN POTASSIUM 100 MG/1
100 TABLET ORAL DAILY
Qty: 90 TABLET | Refills: 1 | Status: SHIPPED | OUTPATIENT
Start: 2024-07-02

## 2024-07-02 NOTE — PROGRESS NOTES
[facility] Cardiology    Encounter Date: 2024    Patient ID: Elizabeth Caceres is a 84 y.o. female.  : 1940     PCP: Eric Wei MD       Chief Complaint: Valvular heart disease      PROBLEM LIST:  Valvular heart disease/aortic stenosis:  Echocardiogram, 10/2013: Critical AS with SHARA 0.7 cm2 and mean gradient of 45 mmHg.   Abnormal Cardiolite stress test, 2013.    [facility], 10/23/2013, Dr. Osman: Critical AS with gradient of 101 mm across the aortic valve.  EF 70%. No significant coronary disease.   AVR with bioprosthetic valve, 10/2013, Dr. Sebastian.   Normal MPS with no evidence of ischemia. EF 80%.  Echocardiogram, 10/03/2017: EF 66-70%. LV diastolic dysfunction (grade I) consistent with impaired relaxation. Bioprosthetic AV. Calcification of the aortic valve mainly affecting the non and right coronary cusp(s). Mild to moderate AS. Peak gradient of 35 and a mean gradient of 20 mm Hg. SHARA 1.0cm2, probably an underestimation. Myxomatous changes of the mitral valve apparatus. Mild MR. No evidence of pericardial effusion.  Echocardiogram, 10/11/2018: EF > 70% with mild concentric LVH. LV diastolic dysfunction (grade I). Mild AS, bioprosthetic AV with peak gradient 30 mmHg, mean 17. No AI. Mild TR, RVSP 37 mmHg. No evidence of pericardial effusion.  Echocardiogram, 2021: EF 61-65%. Moderate LVH. Grade I diastolic dysfunction. Bioprosthetic AV present. Moderate to severe AS, Ao max PG 68 mmHg, Ao mean PG 36 mmHg, SHARA (I,D) 1.16 cm^2. RVSP from TR 35-45 mmHg. Mild PHTN.   Echocardiogram, 2022; EF 61-65%. EF 61-65%. Severe AS, max .4 mmHg, mean PG 65.3 mmHg, Ao V2 .3 cm, SHARA(I,D) 0.51 cm2. Moderate concentric LVH. Grade I diastolic dysfunction. Mild MR and TR. Moderate PHTN (50 mmHg).  Echocardiogram, 2022: Moderate biatrial enlargement, mild to moderate concentric hypertrophy, EF 60%.  There is a bioprosthetic aortic valve present with  "restricted leaflet mobility and severe aortic stenosis with a mean gradient of 56.7 mmHg and STJ 2.6 cm  [facility] 5/19/2022: Angiographically near normal coronaries  AXEL 07/07/2022; LVEF 45% without obvious WMA.  RV function mildly reduced, mild dilation.  Prosthetic AV with severe stenosis, SHARA 0.4 by CE, mean gradient 54mmhg, vmax 4.8m/s; DI 0.16.  Moderate AI (VC 0.4). Moderate MR with central jet. Mild TR, trace PI. No PFO, ÁNGEL clot, nor obvious baseline effusion.  Minimal nonmobile atheroma in aorta.  TAVR, 07/07/2022; Successful transcatheter aortic valve replacement with 20 mm [place] SANGITA tissue valve. No acute procedure-related complications.   Carotid duplex 5/19/2022: No evidence of hemodynamically significant stenosis of bilateral carotid arteries, intimal thickening and mild scattered plaque noted  Echocardiogram, 07/27/2023: EF 61-65%. Left ventricular diastolic function is consistent with (grade I) impaired relaxation. There is a TAVR valve present. The prosthetic aortic valve peak and mean gradients are elevated. There is mild calcification of the mitral valve anterior leaflet(s). Mild MR is present.  Hypertension.   Dyslipidemia.    History of presyncope.   History of MRSA with I&D of lower extremity, 2007.   Vitamin D and Vitamin B12 deficiency.     History of Present Illness  Patient presents today for a follow-up with a history of VHD and cardiac risk factors. Since last visit, patient has been doing well from a cardiac standpoint. She is active and busy in her daily life and tolerates this well. Patient denies any chest pain, shortness of air, palpitations, orthopnea, edema, presyncope or syncope.      Allergies   Allergen Reactions    Carvedilol Nausea Only     \"INTOLERANCE\"         Current Outpatient Medications:     aspirin 81 MG EC tablet, Take 1 tablet by mouth Daily., Disp: 30 tablet, Rfl: 0    atorvastatin (LIPITOR) 20 MG tablet, Take 1 tablet by mouth Daily., Disp: , Rfl:     ferrous " "gluconate (FERGON) 324 MG tablet, Take 1 tablet by mouth Daily., Disp: , Rfl:     furosemide (LASIX) 20 MG tablet, Take 1 tablet by mouth Daily., Disp: , Rfl:     losartan (COZAAR) 100 MG tablet, Take 1 tablet by mouth Daily., Disp: 90 tablet, Rfl: 1    metoprolol tartrate (LOPRESSOR) 25 MG tablet, Take 1 tablet by mouth 2 (Two) Times a Day., Disp: , Rfl:     psyllium (METAMUCIL MULTIHEALTH FIBER) 58.12 % packet, Take 1 packet by mouth Daily., Disp: 30 each, Rfl: 0    vitamin B-12 (CYANOCOBALAMIN) 1000 MCG tablet, Take 1 tablet by mouth Daily., Disp: , Rfl:     vitamin C (ASCORBIC ACID) 250 MG tablet, Take 1 tablet by mouth Daily., Disp: , Rfl:     vitamin D3 125 MCG (5000 UT) capsule capsule, Take 1 capsule by mouth Daily., Disp: , Rfl:     The following portions of the patient's history were reviewed and updated as appropriate: allergies, current medications, past family history, past medical history, past social history, past surgical history and problem list.    ROS  Review of Systems   14 point ROS negative except for that listed in the HPI.         Objective:     /68 (BP Location: Left arm, Patient Position: Sitting)   Pulse 85   Ht 157.5 cm (62\")   Wt 68.8 kg (151 lb 9.6 oz)   LMP  (LMP Unknown)   SpO2 97%   BMI 27.73 kg/m²      Physical Exam  Constitutional: Patient appears well-developed and well-nourished.   HENT: HEENT exam unremarkable.   Neck: Neck supple. No JVD present. No carotid bruits.   Cardiovascular: Normal rate, regular rhythm and normal heart sounds. No murmur heard.   2+ symmetric pulses.   Pulmonary/Chest: Breath sounds normal. Does not exhibit tenderness.   Abdominal: Abdomen benign.   Musculoskeletal: Does not exhibit edema.   Neurological: Neurological exam unremarkable.   Vitals reviewed.    Data Review:   Lab Results   Component Value Date    GLUCOSE 98 08/28/2023    BUN 15 08/28/2023    CREATININE 0.89 08/28/2023    EGFR 64.4 08/28/2023    BCR 16.9 08/28/2023     " 08/28/2023    K 3.9 08/28/2023    CO2 19.6 (L) 08/28/2023    CALCIUM 9.7 08/28/2023    ALBUMIN 4.1 08/25/2023    AST 14 08/25/2023    ALT 10 08/25/2023     Lab Results   Component Value Date    WBC 7.60 08/28/2023    RBC 3.47 (L) 08/28/2023    HGB 10.0 (L) 08/28/2023    HCT 33.7 (L) 08/28/2023    MCV 97.1 (H) 08/28/2023     08/28/2023        ECG 12 Lead    Date/Time: 7/2/2024 1:22 PM  Performed by: Silvana Boyle PA-C    Authorized by: Silvana Boyle PA-C  Comparison: compared with previous ECG from 8/25/2023  Similar to previous ECG  Rhythm: sinus bradycardia  Rate: bradycardic  BPM: 57             Advance Care Planning   ACP discussion was held with the patient during this visit. Patient does not have an advance directive, declines further assistance.           Assessment and plan:      Diagnosis Plan   1. Valvular heart disease  Stable s/p TAVR. Continue serial echo for monitoring.       2. Essential hypertension  Increase losartan to 100mg daily for better blood pressure control.     Monitor BP at home and call our office if SBP is > 140.       3. Dyslipidemia  Continue lipitor 20mg daily         Labs to be obtained with PCP at the end of the month  Continue current medications.   FU in 6 MO, sooner as needed.  Thank you for allowing us to participate in the care of your patient.       Silvana Boyle PA-C      Please note that portions of this note may have been completed with a voice recognition program. Efforts were made to edit the dictations, but occasionally words are mistranscribed.

## 2024-08-02 ENCOUNTER — TRANSCRIBE ORDERS (OUTPATIENT)
Dept: PULMONOLOGY | Facility: HOSPITAL | Age: 84
End: 2024-08-02
Payer: MEDICARE

## 2024-08-02 DIAGNOSIS — Z12.31 BREAST CANCER SCREENING BY MAMMOGRAM: Primary | ICD-10-CM

## 2024-08-15 ENCOUNTER — HOSPITAL ENCOUNTER (OUTPATIENT)
Dept: MAMMOGRAPHY | Facility: HOSPITAL | Age: 84
Discharge: HOME | End: 2024-08-15
Admitting: INTERNAL MEDICINE
Payer: MEDICARE

## 2024-08-15 DIAGNOSIS — Z12.31 BREAST CANCER SCREENING BY MAMMOGRAM: ICD-10-CM

## 2024-08-15 PROCEDURE — 77063 BREAST TOMOSYNTHESIS BI: CPT

## 2024-08-15 PROCEDURE — 77067 SCR MAMMO BI INCL CAD: CPT

## 2024-11-26 RX ORDER — LOSARTAN POTASSIUM 100 MG/1
100 TABLET ORAL DAILY
Qty: 90 TABLET | Refills: 3 | Status: SHIPPED | OUTPATIENT
Start: 2024-11-26

## 2025-01-28 NOTE — PROGRESS NOTES
Subjective:     Encounter Date:02/04/2025      Patient ID: Elizabeth Caceres is a 84 y.o.  white female from [place], [place].    PCP: Eric Wei MD     Chief Complaint:   Chief Complaint   Patient presents with    Valvular heart disease     Problem List:  Valvular heart disease/aortic stenosis:  Echocardiogram, 10/2013: Critical AS with SHARA 0.7 cm2 and mean gradient of 45 mmHg.   Abnormal Cardiolite stress test, October 2013.    [facility], 10/23/2013, Dr. Osman: Critical AS with gradient of 101 mm across the aortic valve.  EF 70%. No significant coronary disease.   AVR with bioprosthetic valve, 10/2013, Dr. Sebastian.   Normal MPS with no evidence of ischemia. EF 80%.  Echocardiogram, 10/03/2017: EF 66-70%. LV diastolic dysfunction (grade I) consistent with impaired relaxation. Bioprosthetic AV. Calcification of the aortic valve mainly affecting the non and right coronary cusp(s). Mild to moderate AS. Peak gradient of 35 and a mean gradient of 20 mm Hg. SHARA 1.0cm2, probably an underestimation. Myxomatous changes of the mitral valve apparatus. Mild MR. No evidence of pericardial effusion.  Echocardiogram, 10/11/2018: EF > 70% with mild concentric LVH. LV diastolic dysfunction (grade I). Mild AS, bioprosthetic AV with peak gradient 30 mmHg, mean 17. No AI. Mild TR, RVSP 37 mmHg. No evidence of pericardial effusion.  Echocardiogram, 5/13/2021: EF 61-65%. Moderate LVH. Grade I diastolic dysfunction. Bioprosthetic AV present. Moderate to severe AS, Ao max PG 68 mmHg, Ao mean PG 36 mmHg, SHARA (I,D) 1.16 cm^2. RVSP from TR 35-45 mmHg. Mild PHTN.   Echocardiogram, 04/22/2022; EF 61-65%. EF 61-65%. Severe AS, max .4 mmHg, mean PG 65.3 mmHg, Ao V2 .3 cm, SHARA(I,D) 0.51 cm2. Moderate concentric LVH. Grade I diastolic dysfunction. Mild MR and TR. Moderate PHTN (50 mmHg).  Echocardiogram, 5/19/2022: Moderate biatrial enlargement, mild to moderate concentric hypertrophy, EF 60%.  There is a bioprosthetic  "aortic valve present with restricted leaflet mobility and severe aortic stenosis with a mean gradient of 56.7 mmHg and STJ 2.6 cm  [facility] 5/19/2022: Angiographically near normal coronaries  AXEL 07/07/2022; LVEF 45% without obvious WMA.  RV function mildly reduced, mild dilation.  Prosthetic AV with severe stenosis, SHARA 0.4 by CE, mean gradient 54mmhg, vmax 4.8m/s; DI 0.16.  Moderate AI (VC 0.4). Moderate MR with central jet. Mild TR, trace PI. No PFO, ÁNGEL clot, nor obvious baseline effusion.  Minimal nonmobile atheroma in aorta.  TAVR, 07/07/2022; Successful transcatheter aortic valve replacement with 20 mm [place] SANGITA tissue valve. No acute procedure-related complications.   Carotid duplex 5/19/2022: No evidence of hemodynamically significant stenosis of bilateral carotid arteries, intimal thickening and mild scattered plaque noted  Echocardiogram, 07/27/2023: EF 61-65%. Left ventricular diastolic function is consistent with (grade I) impaired relaxation. There is a TAVR valve present. The prosthetic aortic valve peak and mean gradients are elevated. There is mild calcification of the mitral valve anterior leaflet(s). Mild MR is present.  Hypertension.   Dyslipidemia.    History of presyncope.   History of MRSA with I&D of lower extremity, 2007.   Vitamin D and Vitamin B12 deficiency.      Allergies   Allergen Reactions    Carvedilol Nausea Only     \"INTOLERANCE\"       Current Outpatient Medications   Medication Instructions    aspirin 81 mg, Oral, Daily    atorvastatin (LIPITOR) 20 mg, Daily    ferrous gluconate (FERGON) 324 MG tablet 1 tablet, Daily    furosemide (LASIX) 20 mg, Daily    losartan (COZAAR) 100 mg, Oral, Daily    metoprolol tartrate (LOPRESSOR) 25 mg, 2 Times Daily    psyllium (METAMUCIL MULTIHEALTH FIBER) 58.12 % packet 1 packet, Oral, Daily    vitamin B-12 (CYANOCOBALAMIN) 1,000 mcg, Daily    vitamin C (ASCORBIC ACID) 250 mg, Daily    vitamin D3 5,000 Units, Daily         HISTORY OF PRESENT " "ILLNESS:  The patient is here for 6-month follow-up.  She had a bioprosthetic AVR in 2013 and a TAVR in 2022.  Patient denies any chest pain, shortness of breath, palpitations, dizziness, presyncope, or syncope.  She had lightheadedness for 1 second when she was in a room full of a lot of people around the holidays but it only lasted for a second.  Patient has upper dentures and has no teeth on bottom.  She says it has been like that for years.  She does all of her housework and runs errands without any cardiopulmonary complaints.      ROS   All other systems reviewed and otherwise negative.    Procedures       Objective:       Vitals:    02/04/25 1248   BP: 140/60   BP Location: Left arm   Patient Position: Sitting   Pulse: 54   SpO2: 96%   Weight: 69.2 kg (152 lb 9.6 oz)   Height: 157.5 cm (62\")     Body mass index is 27.91 kg/m².  Wt Readings from Last 2 Encounters:   02/04/25 69.2 kg (152 lb 9.6 oz)   07/02/24 68.8 kg (151 lb 9.6 oz)        Constitutional:       Appearance: Healthy appearance. Not in distress.   HENT:    Mouth/Throat:      Dentition: Has dentures.   Neck:      Vascular: Carotid bruit present. No JVR. JVD normal.   Pulmonary:      Effort: Pulmonary effort is normal.      Breath sounds: Normal breath sounds. No wheezing. No rhonchi. No rales.   Chest:      Chest wall: Not tender to palpatation.   Cardiovascular:      PMI at left midclavicular line. Normal rate. Regular rhythm. Normal S1. Normal S2.       Murmurs: There is a grade 2/6 systolic murmur at the URSB and LLSB, radiating to the neck.      No gallop.  No click. No rub.   Pulses:     Intact distal pulses.   Edema:     Peripheral edema absent.   Abdominal:      General: Bowel sounds are normal.      Palpations: Abdomen is soft.      Tenderness: There is no abdominal tenderness.   Musculoskeletal: Normal range of motion.         General: No tenderness. Skin:     General: Skin is warm and dry.   Neurological:      General: No focal deficit " present.      Mental Status: Alert and oriented to person, place and time.           Lab Review:   Lab Results   Component Value Date    GLUCOSE 98 08/28/2023    BUN 15 08/28/2023    CREATININE 0.89 08/28/2023    EGFRIFNONA 76 04/22/2019    BCR 16.9 08/28/2023    CO2 19.6 (L) 08/28/2023    CALCIUM 9.7 08/28/2023    ALBUMIN 4.1 08/25/2023    LABIL2 1.4 (L) 03/10/2015    AST 14 08/25/2023    ALT 10 08/25/2023       Lab Results   Component Value Date    WBC 7.60 08/28/2023    HGB 10.0 (L) 08/28/2023    HCT 33.7 (L) 08/28/2023    MCV 97.1 (H) 08/28/2023     08/28/2023       Lab Results   Component Value Date    HGBA1C 4.90 09/10/2022       Lab Results   Component Value Date    TSH 2.850 09/09/2022       Lab Results   Component Value Date    CHOL 112 09/10/2022    CHOL 135 05/19/2022     Lab Results   Component Value Date    TRIG 78 09/10/2022    TRIG 58 05/19/2022     Lab Results   Component Value Date    HDL 47 09/10/2022    HDL 72 (H) 05/19/2022     Lab Results   Component Value Date    LDL 49 09/10/2022    LDL 51 05/19/2022             Results for orders placed during the hospital encounter of 07/26/23    Adult Transthoracic Echo Complete W/ Cont if Necessary Per Protocol    Interpretation Summary    The study is technically difficult for diagnosis.    Normal left ventricular cavity size and wall thickness noted. All left ventricular wall segments contract normally.    Left ventricular ejection fraction appears to be 61 - 65%.    Left ventricular diastolic function is consistent with (grade I) impaired relaxation.    There is a TAVR valve present. The prosthetic aortic valve peak and mean gradients are elevated.    Ao max PG 42.8 mmHg Ao mean PG 25 mmHg Ao V2 VTI 79.8 cm SHARA(I,D) 0.67 cm2 (possibly underestimation of the aortic valve area)    There is mild calcification of the mitral valve anterior leaflet(s). Mild mitral valve regurgitation is present. No significant mitral valve stenosis is present.    There  is no evidence of pericardial effusion.    Comments: May consider a transesophageal echocardiographic study to have a better assessment of the TAVR valve if clinically. warranted.       Results for orders placed during the hospital encounter of 05/19/22    Duplex Carotid Ultrasound CAR    Interpretation Summary  · No evidence of hemodynamically significant stenosis of bilateral carotid arteries.  · Intimal thickening and mild scattered plaque is noted.       Advance Care Planning   ACP discussion was held with the patient during this visit. Patient does not have an advance directive, declines further assistance.      Assessment:     Overall continued acceptable course with no new interim cardiopulmonary complaints with good functional status. We will defer additional diagnostic or therapeutic intervention from a cardiac perspective at this time other than an echocardiogram to assess heart structure/function/VHD. Hopefully I will get to review the patient's next laboratory testing results.         Diagnosis Plan   1. Severe AS s/p bioprothetic valve in 2013   Adult Transthoracic Echo Complete W/ Cont if Necessary Per Protocol      2. Essential hypertension  Controlled, continue current cardiac medications      3. Dyslipidemia  No new labs to review, continue atorvastatin             Plan:         Patient to continue current medications and close follow up with the above providers.  Tentative cardiology follow up in July 2025 or patient may return sooner PRN.  Echocardiogram same day as next appointment      Electronically signed by VALDEZ Shaffer, 02/04/25, 1:01 PM EST.

## 2025-02-04 ENCOUNTER — OFFICE VISIT (OUTPATIENT)
Age: 85
End: 2025-02-04
Payer: MEDICARE

## 2025-02-04 VITALS
HEART RATE: 54 BPM | HEIGHT: 62 IN | BODY MASS INDEX: 28.08 KG/M2 | WEIGHT: 152.6 LBS | DIASTOLIC BLOOD PRESSURE: 60 MMHG | SYSTOLIC BLOOD PRESSURE: 140 MMHG | OXYGEN SATURATION: 96 %

## 2025-02-04 DIAGNOSIS — I35.0 AORTIC STENOSIS, SEVERE: Primary | ICD-10-CM

## 2025-02-04 DIAGNOSIS — I10 ESSENTIAL HYPERTENSION: ICD-10-CM

## 2025-02-04 DIAGNOSIS — E78.5 DYSLIPIDEMIA: ICD-10-CM

## (undated) DEVICE — CATH DIAG EXPO M/ PK 5F FL4/FR4 PIG

## (undated) DEVICE — COVER,TABLE,HVY DUTY,60"X90",STRL: Brand: MEDLINE

## (undated) DEVICE — DRSNG SURESITE WNDW 4X4.5

## (undated) DEVICE — Device: Brand: DEFENDO AIR/WATER/SUCTION AND BIOPSY VALVE

## (undated) DEVICE — PROVIDES A STERILE INTERFACE BETWEEN THE OPERATING ROOM SURGICAL LAMPS (NON-STERILE) AND THE SURGEON OR NURSE (STERILE).: Brand: STERION®CLAMP COVER FABRIC

## (undated) DEVICE — RADIFOCUS GLIDEWIRE ADVANTAGE GUIDEWIRE: Brand: GLIDEWIRE ADVANTAGE

## (undated) DEVICE — SLV REPOSTNG CATH STRL 60CM

## (undated) DEVICE — ANGIO-SEAL VIP VASCULAR CLOSURE DEVICE: Brand: ANGIO-SEAL

## (undated) DEVICE — ECHELON FLEX45 ENDOPATH STAPLER, ARTICULATING ENDOSCOPIC LINEAR CUTTER (NO CARTRIDGE): Brand: ECHELON ENDOPATH

## (undated) DEVICE — ELECTRD DEFIB M/FUNC PROPADZ STRL 2PK

## (undated) DEVICE — SYR LUERLOK 30CC

## (undated) DEVICE — BOOT SUT XRAY DETECT STD YEL/BLU CA/50

## (undated) DEVICE — GW PERIPH VASC ADX J/TP SS .035 150CM 3MM

## (undated) DEVICE — SUT VIC 0/0 UR6 27IN DYED J603H

## (undated) DEVICE — [HIGH FLOW INSUFFLATOR,  DO NOT USE IF PACKAGE IS DAMAGED,  KEEP DRY,  KEEP AWAY FROM SUNLIGHT,  PROTECT FROM HEAT AND RADIOACTIVE SOURCES.]: Brand: PNEUMOSURE

## (undated) DEVICE — SUT MNCRYL 4/0 PS2 18 IN

## (undated) DEVICE — PINNACLE INTRODUCER SHEATH: Brand: PINNACLE

## (undated) DEVICE — TRAP FLD MINIVAC MEGADYNE 100ML

## (undated) DEVICE — APPL CHLORAPREP W/TINT 26ML ORNG

## (undated) DEVICE — ENDOPATH XCEL BLUNT TIP TROCARS WITH SMOOTH SLEEVES: Brand: ENDOPATH XCEL

## (undated) DEVICE — SKIN AFFIX SURG ADHESIVE 72/CS 0.55ML: Brand: MEDLINE

## (undated) DEVICE — APPL CHLORAPREP TINTED 26ML TEAL

## (undated) DEVICE — PK CATH CARD 10

## (undated) DEVICE — Device

## (undated) DEVICE — HDRST POSTIN FM CRDL TRACH SLOT 9X8X4IN

## (undated) DEVICE — CATH DIAG EXPO .056 AL1 6F 100CM

## (undated) DEVICE — SUT PROLN 4/0 BB D/A 36IN 8581H

## (undated) DEVICE — GW CERBRL FC PTFE STD/STR .035 260CM

## (undated) DEVICE — ENSEAL TEMPERATURE CONTROLLED TISSUE SEALING TECHNOLOGY DISPOSABLE TISSUE SEALING DEVICE TAPTRONIC TRIGGER ACTIVATED POWER 5MM JAW STYLE: Brand: ENSEAL

## (undated) DEVICE — SUT SILK 2/0 TIES 18IN A185H

## (undated) DEVICE — PK LAP GEN 70

## (undated) DEVICE — ADHS SKIN PREMIERPRO EXOFIN TOPICAL HI/VISC .5ML

## (undated) DEVICE — SUCTION CANISTER 2500CC: Brand: DEROYAL

## (undated) DEVICE — 2, DISPOSABLE SUCTION/IRRIGATOR WITH DISPOSABLE TIP: Brand: STRYKEFLOW

## (undated) DEVICE — 3M™ STERI-DRAPE™ INSTRUMENT POUCH 1018: Brand: STERI-DRAPE™

## (undated) DEVICE — COVER,MAYO STAND,XL,STERILE: Brand: MEDLINE

## (undated) DEVICE — TBG PENCL TELESCP MEGADYNE SMOKE EVAC 10FT

## (undated) DEVICE — FRCP BX RADJAW4 NDL 2.8 240CM LG OG BX40

## (undated) DEVICE — SUT PROLN 7/0 BV1 24IN 4PK M8702

## (undated) DEVICE — PATIENT RETURN ELECTRODE, SINGLE-USE, CONTACT QUALITY MONITORING, ADULT, WITH 9FT CORD, FOR PATIENTS WEIGING OVER 33LBS. (15KG): Brand: MEGADYNE

## (undated) DEVICE — SHEET, DRAPE, SPLIT, STERILE: Brand: MEDLINE

## (undated) DEVICE — CYSTO/BLADDER IRRIGATION SET, REGULATING CLAMP

## (undated) DEVICE — SYR LUERLOK 50ML

## (undated) DEVICE — CONN Y IRR DISP 1P/U

## (undated) DEVICE — CABL PACE ATRIAL PT BLU

## (undated) DEVICE — GW AMPLTZ SUPERSTIFF STR .035IN 180CM

## (undated) DEVICE — PAD ARMBRD SURG CONVOL 7.5X20X2IN

## (undated) DEVICE — GLIDEX™ COATED HYDROPHILIC GUIDEWIRE: Brand: MAGIC TORQUE™

## (undated) DEVICE — MODEL AT P65, P/N 701554-001KIT CONTENTS: HAND CONTROLLER, 3-WAY HIGH-PRESSURE STOPCOCK WITH ROTATING END AND PREMIUM HIGH-PRESSURE TUBING: Brand: ANGIOTOUCH® KIT

## (undated) DEVICE — GW SAFARI2 PRESH XSM CRV .035IN 3.2X2.9X275CM

## (undated) DEVICE — ENCORE® LATEX MICRO SIZE 7.5, STERILE LATEX POWDER-FREE SURGICAL GLOVE: Brand: ENCORE

## (undated) DEVICE — NDL PERC 1PRT THNWALL W/BASEPLT 18G 7CM

## (undated) DEVICE — CATH GUIDE BERN 5F 65CM

## (undated) DEVICE — PAD GRND REM POLYHESIVE A/ DISP

## (undated) DEVICE — SUT SILK 4/0 TIES 18IN A183H

## (undated) DEVICE — SHROD PENCL MEGADYNE ATTACHAVAC W/CONN/22MM

## (undated) DEVICE — SUT MNCRYL PLS ANTIB UD 4/0 PS2 18IN

## (undated) DEVICE — ANGIOGRAPHIC CATHETER: Brand: EXPO™

## (undated) DEVICE — BOWL UTIL STRL 32OZ

## (undated) DEVICE — SUT PROLN 6/0 C1 D/A 30IN 8706H

## (undated) DEVICE — 3M™ TEGADERM™ HIGH PERFORMANCE FOAM ADHESIVE DRESSING 90619, HEELDESIGN, 5 EACH/CARTON, 4 CARTON/CASE: Brand: 3M™ TEGADERM™

## (undated) DEVICE — MODEL BT2000 P/N 700287-012KIT CONTENTS: MANIFOLD WITH SALINE AND CONTRAST PORTS, SALINE TUBING WITH SPIKE AND HAND SYRINGE, TRANSDUCER: Brand: BT2000 AUTOMATED MANIFOLD KIT

## (undated) DEVICE — BLANKT WARM UNDER/BDY A/ 100X195CM DISP LF

## (undated) DEVICE — ENDOCUT SCISSOR TIP, DISPOSABLE: Brand: RENEW

## (undated) DEVICE — ANTIBACTERIAL UNDYED BRAIDED (POLYGLACTIN 910), SYNTHETIC ABSORBABLE SUTURE: Brand: COATED VICRYL

## (undated) DEVICE — DEV COMP RAD PRELUDESYNC 24CM

## (undated) DEVICE — INTENDED FOR TISSUE SEPARATION, AND OTHER PROCEDURES THAT REQUIRE A SHARP SURGICAL BLADE TO PUNCTURE OR CUT.: Brand: BARD-PARKER ® STAINLESS STEEL BLADES

## (undated) DEVICE — 3M™ IOBAN™ 2 ANTIMICROBIAL INCISE DRAPE 6651EZ: Brand: IOBAN™ 2

## (undated) DEVICE — THE BITE BLOCK MAXI, LATEX FREE STRAP IS USED TO PROTECT THE ENDOSCOPE INSERTION TUBE FROM BEING BITTEN BY THE PATIENT.

## (undated) DEVICE — SI AVANTI+ 7F STD W/GW  NO OBT: Brand: AVANTI

## (undated) DEVICE — CATH PACE PACEL BIPOL 5F110CM

## (undated) DEVICE — PERCLOSE™ PROSTYLE™ SUTURE-MEDIATED CLOSURE AND REPAIR SYSTEM: Brand: PERCLOSE™ PROSTYLE™

## (undated) DEVICE — PK MINOR SPLT 10

## (undated) DEVICE — PK ATS CUST W CARDIOTOMY RESEVOIR

## (undated) DEVICE — ENDOPATH XCEL UNIVERSAL TROCAR STABLILITY SLEEVES: Brand: ENDOPATH XCEL

## (undated) DEVICE — PENCL ROCKRSWCH MEGADYNE W/HOLSTR 10FT SS

## (undated) DEVICE — CATH DIAG EXPO .045 FL3  5F 100CM

## (undated) DEVICE — DECANTER BAG 9": Brand: MEDLINE INDUSTRIES, INC.

## (undated) DEVICE — ELECTRD BLD EZ CLN STD 2.5IN

## (undated) DEVICE — ENDOGATOR AUXILIARY WATER JET CONNECTOR: Brand: ENDOGATOR

## (undated) DEVICE — HOLDER: Brand: DEROYAL

## (undated) DEVICE — RADIFOCUS GLIDEWIRE: Brand: GLIDEWIRE

## (undated) DEVICE — SUT SILK 3/0 TIES 18IN A184H

## (undated) DEVICE — ENDOPOUCH RETRIEVER SPECIMEN RETRIEVAL BAGS: Brand: ENDOPOUCH RETRIEVER

## (undated) DEVICE — GLV SURG BIOGELULTRATOUCH POLYISPRN PF LF SZ7 STRL

## (undated) DEVICE — GOWN,NON-REINFORCED,SIRUS,SET IN SLV,XL: Brand: MEDLINE

## (undated) DEVICE — ENDOPATH XCEL BLADELESS TROCARS WITH STABILITY SLEEVES: Brand: ENDOPATH XCEL

## (undated) DEVICE — KT MANIFOLD CATHLAB CUST

## (undated) DEVICE — STERILE COTTON BALLS LARGE 5/P: Brand: MEDLINE

## (undated) DEVICE — 3M™ STERI-DRAPE™ FLUOROSCOPE DRAPE, 10 PER CARTON / 4 CARTONS PER CASE, 1012: Brand: STERI-DRAPE™